# Patient Record
Sex: MALE | Race: WHITE | Employment: OTHER | URBAN - METROPOLITAN AREA
[De-identification: names, ages, dates, MRNs, and addresses within clinical notes are randomized per-mention and may not be internally consistent; named-entity substitution may affect disease eponyms.]

---

## 2017-05-22 PROBLEM — R10.12 LUQ ABDOMINAL PAIN: Status: ACTIVE | Noted: 2017-05-22

## 2017-12-18 PROBLEM — N52.9 MALE ERECTILE DYSFUNCTION, UNSPECIFIED: Status: ACTIVE | Noted: 2017-12-18

## 2018-12-18 PROBLEM — J30.9 CHRONIC ALLERGIC RHINITIS: Status: ACTIVE | Noted: 2018-08-16

## 2018-12-18 PROBLEM — G45.9 TRANSIENT CEREBRAL ISCHEMIA: Status: ACTIVE | Noted: 2017-12-01

## 2018-12-18 PROBLEM — G47.33 OSA (OBSTRUCTIVE SLEEP APNEA): Status: ACTIVE | Noted: 2018-08-16

## 2018-12-18 PROBLEM — J32.0 CHRONIC MAXILLARY SINUSITIS: Status: ACTIVE | Noted: 2018-10-24

## 2018-12-18 PROBLEM — J34.2 DEVIATED NASAL SEPTUM: Status: ACTIVE | Noted: 2018-09-27

## 2018-12-18 PROBLEM — H90.3 SENSORINEURAL HEARING LOSS (SNHL) OF BOTH EARS: Status: ACTIVE | Noted: 2018-10-31

## 2019-07-01 ENCOUNTER — HOSPITAL ENCOUNTER (OUTPATIENT)
Dept: NON INVASIVE DIAGNOSTICS | Age: 69
Discharge: HOME | End: 2019-07-01
Payer: MEDICARE

## 2019-07-01 DIAGNOSIS — Z01.818 PREOPERATIVE EXAMINATION, UNSPECIFIED: ICD-10-CM

## 2019-07-01 LAB
ATRIAL RATE: 66 BPM
CALCULATED P AXIS, ECG09: 10 DEGREES
CALCULATED R AXIS, ECG10: -12 DEGREES
CALCULATED T AXIS, ECG11: 36 DEGREES
DIAGNOSIS, 93000: NORMAL
HCT VFR BLD AUTO: 43.5 % (ref 36–48)
HGB BLD-MCNC: 14.2 G/DL (ref 13–16)
P-R INTERVAL, ECG05: 188 MS
POTASSIUM SERPL-SCNC: 5.2 MMOL/L (ref 3.5–5.5)
Q-T INTERVAL, ECG07: 392 MS
QRS DURATION, ECG06: 94 MS
QTC CALCULATION (BEZET), ECG08: 410 MS
VENTRICULAR RATE, ECG03: 66 BPM

## 2019-07-01 PROCEDURE — 84132 ASSAY OF SERUM POTASSIUM: CPT

## 2019-07-01 PROCEDURE — 93005 ELECTROCARDIOGRAM TRACING: CPT

## 2019-07-01 PROCEDURE — 36415 COLL VENOUS BLD VENIPUNCTURE: CPT

## 2019-07-01 PROCEDURE — 85018 HEMOGLOBIN: CPT

## 2019-07-02 ENCOUNTER — HOSPITAL ENCOUNTER (OUTPATIENT)
Dept: LAB | Age: 69
Discharge: HOME | End: 2019-07-02
Payer: MEDICARE

## 2019-07-02 PROCEDURE — 87106 FUNGI IDENTIFICATION YEAST: CPT

## 2019-07-02 PROCEDURE — 87077 CULTURE AEROBIC IDENTIFY: CPT

## 2019-07-02 PROCEDURE — 87070 CULTURE OTHR SPECIMN AEROBIC: CPT

## 2019-07-02 PROCEDURE — 87186 SC STD MICRODIL/AGAR DIL: CPT

## 2019-07-02 PROCEDURE — 87075 CULTR BACTERIA EXCEPT BLOOD: CPT

## 2019-07-06 LAB
BACTERIA SPEC CULT: ABNORMAL
GRAM STN SPEC: ABNORMAL
GRAM STN SPEC: ABNORMAL
SERVICE CMNT-IMP: ABNORMAL

## 2019-07-07 LAB
BACTERIA SPEC CULT: NORMAL
SERVICE CMNT-IMP: NORMAL

## 2019-07-08 ENCOUNTER — HOSPITAL ENCOUNTER (INPATIENT)
Age: 69
LOS: 3 days | Discharge: HOME HEALTH | DRG: 572 | End: 2019-07-11
Attending: HOSPITALIST | Admitting: HOSPITALIST
Payer: MEDICARE

## 2019-07-08 ENCOUNTER — APPOINTMENT (OUTPATIENT)
Dept: MRI IMAGING | Age: 69
DRG: 572 | End: 2019-07-08
Attending: HOSPITALIST
Payer: MEDICARE

## 2019-07-08 PROBLEM — L03.116 CELLULITIS OF FOOT, LEFT: Status: ACTIVE | Noted: 2019-07-08

## 2019-07-08 PROBLEM — L03.90 CELLULITIS: Status: ACTIVE | Noted: 2019-07-08

## 2019-07-08 LAB
ALBUMIN SERPL-MCNC: 3.1 G/DL (ref 3.4–5)
ALBUMIN/GLOB SERPL: 0.8 {RATIO} (ref 0.8–1.7)
ALP SERPL-CCNC: 112 U/L (ref 45–117)
ALT SERPL-CCNC: 36 U/L (ref 16–61)
ANION GAP SERPL CALC-SCNC: 7 MMOL/L (ref 3–18)
AST SERPL-CCNC: 27 U/L (ref 15–37)
BASOPHILS # BLD: 0 K/UL (ref 0–0.1)
BASOPHILS NFR BLD: 0 % (ref 0–2)
BILIRUB SERPL-MCNC: 0.4 MG/DL (ref 0.2–1)
BUN SERPL-MCNC: 20 MG/DL (ref 7–18)
BUN/CREAT SERPL: 21 (ref 12–20)
CALCIUM SERPL-MCNC: 9 MG/DL (ref 8.5–10.1)
CHLORIDE SERPL-SCNC: 103 MMOL/L (ref 100–108)
CO2 SERPL-SCNC: 27 MMOL/L (ref 21–32)
CREAT SERPL-MCNC: 0.97 MG/DL (ref 0.6–1.3)
DIFFERENTIAL METHOD BLD: ABNORMAL
EOSINOPHIL # BLD: 0 K/UL (ref 0–0.4)
EOSINOPHIL NFR BLD: 0 % (ref 0–5)
ERYTHROCYTE [DISTWIDTH] IN BLOOD BY AUTOMATED COUNT: 15.4 % (ref 11.6–14.5)
GLOBULIN SER CALC-MCNC: 4 G/DL (ref 2–4)
GLUCOSE SERPL-MCNC: 146 MG/DL (ref 74–99)
HCT VFR BLD AUTO: 35.7 % (ref 36–48)
HGB BLD-MCNC: 11.8 G/DL (ref 13–16)
LYMPHOCYTES # BLD: 1.2 K/UL (ref 0.9–3.6)
LYMPHOCYTES NFR BLD: 11 % (ref 21–52)
MAGNESIUM SERPL-MCNC: 1.9 MG/DL (ref 1.6–2.6)
MCH RBC QN AUTO: 29.7 PG (ref 24–34)
MCHC RBC AUTO-ENTMCNC: 33.1 G/DL (ref 31–37)
MCV RBC AUTO: 89.9 FL (ref 74–97)
MONOCYTES # BLD: 0.8 K/UL (ref 0.05–1.2)
MONOCYTES NFR BLD: 8 % (ref 3–10)
NEUTS SEG # BLD: 9 K/UL (ref 1.8–8)
NEUTS SEG NFR BLD: 81 % (ref 40–73)
PLATELET # BLD AUTO: 261 K/UL (ref 135–420)
PMV BLD AUTO: 9.4 FL (ref 9.2–11.8)
POTASSIUM SERPL-SCNC: 4.8 MMOL/L (ref 3.5–5.5)
PROT SERPL-MCNC: 7.1 G/DL (ref 6.4–8.2)
RBC # BLD AUTO: 3.97 M/UL (ref 4.7–5.5)
SODIUM SERPL-SCNC: 137 MMOL/L (ref 136–145)
WBC # BLD AUTO: 11.1 K/UL (ref 4.6–13.2)

## 2019-07-08 PROCEDURE — 74011250636 HC RX REV CODE- 250/636: Performed by: HOSPITALIST

## 2019-07-08 PROCEDURE — 73720 MRI LWR EXTREMITY W/O&W/DYE: CPT

## 2019-07-08 PROCEDURE — 74011250637 HC RX REV CODE- 250/637: Performed by: HOSPITALIST

## 2019-07-08 PROCEDURE — 36415 COLL VENOUS BLD VENIPUNCTURE: CPT

## 2019-07-08 PROCEDURE — 80053 COMPREHEN METABOLIC PANEL: CPT

## 2019-07-08 PROCEDURE — 85025 COMPLETE CBC W/AUTO DIFF WBC: CPT

## 2019-07-08 PROCEDURE — 74011636320 HC RX REV CODE- 636/320: Performed by: HOSPITALIST

## 2019-07-08 PROCEDURE — A9575 INJ GADOTERATE MEGLUMI 0.1ML: HCPCS | Performed by: HOSPITALIST

## 2019-07-08 PROCEDURE — 83735 ASSAY OF MAGNESIUM: CPT

## 2019-07-08 PROCEDURE — 65270000029 HC RM PRIVATE

## 2019-07-08 RX ORDER — KETOROLAC TROMETHAMINE 30 MG/ML
15 INJECTION, SOLUTION INTRAMUSCULAR; INTRAVENOUS
Status: DISCONTINUED | OUTPATIENT
Start: 2019-07-08 | End: 2019-07-11 | Stop reason: HOSPADM

## 2019-07-08 RX ORDER — ATORVASTATIN CALCIUM 20 MG/1
80 TABLET, FILM COATED ORAL
Status: DISCONTINUED | OUTPATIENT
Start: 2019-07-08 | End: 2019-07-11 | Stop reason: HOSPADM

## 2019-07-08 RX ORDER — TAMSULOSIN HYDROCHLORIDE 0.4 MG/1
0.4 CAPSULE ORAL DAILY
Status: DISCONTINUED | OUTPATIENT
Start: 2019-07-09 | End: 2019-07-11 | Stop reason: HOSPADM

## 2019-07-08 RX ORDER — LORATADINE 10 MG/1
10 TABLET ORAL DAILY
Status: DISCONTINUED | OUTPATIENT
Start: 2019-07-09 | End: 2019-07-11 | Stop reason: HOSPADM

## 2019-07-08 RX ORDER — NALOXONE HYDROCHLORIDE 0.4 MG/ML
0.4 INJECTION, SOLUTION INTRAMUSCULAR; INTRAVENOUS; SUBCUTANEOUS AS NEEDED
Status: DISCONTINUED | OUTPATIENT
Start: 2019-07-08 | End: 2019-07-11 | Stop reason: HOSPADM

## 2019-07-08 RX ORDER — ACETAMINOPHEN 325 MG/1
650 TABLET ORAL
Status: DISCONTINUED | OUTPATIENT
Start: 2019-07-08 | End: 2019-07-11 | Stop reason: HOSPADM

## 2019-07-08 RX ORDER — SERTRALINE HYDROCHLORIDE 50 MG/1
50 TABLET, FILM COATED ORAL DAILY
Status: DISCONTINUED | OUTPATIENT
Start: 2019-07-09 | End: 2019-07-11 | Stop reason: HOSPADM

## 2019-07-08 RX ORDER — MONTELUKAST SODIUM 10 MG/1
10 TABLET ORAL DAILY
Status: DISCONTINUED | OUTPATIENT
Start: 2019-07-09 | End: 2019-07-11 | Stop reason: HOSPADM

## 2019-07-08 RX ORDER — DIPHENHYDRAMINE HYDROCHLORIDE 50 MG/ML
12.5 INJECTION, SOLUTION INTRAMUSCULAR; INTRAVENOUS
Status: DISCONTINUED | OUTPATIENT
Start: 2019-07-08 | End: 2019-07-11 | Stop reason: HOSPADM

## 2019-07-08 RX ORDER — ALLOPURINOL 100 MG/1
100 TABLET ORAL DAILY
Status: DISCONTINUED | OUTPATIENT
Start: 2019-07-09 | End: 2019-07-11 | Stop reason: HOSPADM

## 2019-07-08 RX ORDER — ONDANSETRON 2 MG/ML
4 INJECTION INTRAMUSCULAR; INTRAVENOUS
Status: DISCONTINUED | OUTPATIENT
Start: 2019-07-08 | End: 2019-07-11 | Stop reason: HOSPADM

## 2019-07-08 RX ORDER — VANCOMYCIN HYDROCHLORIDE
1250 EVERY 12 HOURS
Status: DISCONTINUED | OUTPATIENT
Start: 2019-07-09 | End: 2019-07-10 | Stop reason: DRUGHIGH

## 2019-07-08 RX ORDER — AMLODIPINE BESYLATE 5 MG/1
10 TABLET ORAL DAILY
Status: DISCONTINUED | OUTPATIENT
Start: 2019-07-09 | End: 2019-07-11 | Stop reason: HOSPADM

## 2019-07-08 RX ORDER — VANCOMYCIN/0.9 % SOD CHLORIDE 1.5G/250ML
1500 PLASTIC BAG, INJECTION (ML) INTRAVENOUS ONCE
Status: COMPLETED | OUTPATIENT
Start: 2019-07-08 | End: 2019-07-08

## 2019-07-08 RX ORDER — METOPROLOL TARTRATE 25 MG/1
25 TABLET, FILM COATED ORAL DAILY
Status: DISCONTINUED | OUTPATIENT
Start: 2019-07-09 | End: 2019-07-11 | Stop reason: HOSPADM

## 2019-07-08 RX ORDER — ATORVASTATIN CALCIUM 80 MG/1
80 TABLET, FILM COATED ORAL
COMMUNITY

## 2019-07-08 RX ORDER — HEPARIN SODIUM 5000 [USP'U]/ML
5000 INJECTION, SOLUTION INTRAVENOUS; SUBCUTANEOUS EVERY 8 HOURS
Status: DISCONTINUED | OUTPATIENT
Start: 2019-07-08 | End: 2019-07-11 | Stop reason: HOSPADM

## 2019-07-08 RX ADMIN — GADOTERATE MEGLUMINE 15 ML: 376.9 INJECTION INTRAVENOUS at 16:03

## 2019-07-08 RX ADMIN — HEPARIN SODIUM 5000 UNITS: 5000 INJECTION INTRAVENOUS; SUBCUTANEOUS at 13:50

## 2019-07-08 RX ADMIN — KETOROLAC TROMETHAMINE 15 MG: 30 INJECTION, SOLUTION INTRAMUSCULAR at 19:27

## 2019-07-08 RX ADMIN — HEPARIN SODIUM 5000 UNITS: 5000 INJECTION INTRAVENOUS; SUBCUTANEOUS at 21:41

## 2019-07-08 RX ADMIN — VANCOMYCIN HYDROCHLORIDE 1500 MG: 10 INJECTION, POWDER, LYOPHILIZED, FOR SOLUTION INTRAVENOUS at 16:25

## 2019-07-08 RX ADMIN — ATORVASTATIN CALCIUM 80 MG: 20 TABLET, FILM COATED ORAL at 21:41

## 2019-07-08 NOTE — H&P
History & Physical    Patient: Елена Sargent. MRN: [id number]  CSN: [id number]    YOB: 1950  Age: 80 y.o. Sex: male      DOA: 7/8/2019  Primary Care Provider:  Iline Kocher, MD      Assessment/Plan     Hospital Problems  Date Reviewed: 12/18/2017          Codes Class Noted POA    Cellulitis ICD-10-CM: L03.90  ICD-9-CM: 682.9  7/8/2019         * (Principal) Cellulitis of foot, left ICD-10-CM: L03.116  ICD-9-CM: 682.7  7/8/2019 Unknown        Rheumatoid arthritis ([facility] [address] ICD-10-CM: M06.9  ICD-9-CM: 714.0  12/5/2016 Yes        Pure hypercholesterolemia ICD-10-CM: E78.00  ICD-9-CM: 272.0  12/5/2016 Yes        Essential hypertension ICD-10-CM: I10  ICD-9-CM: 401.9  12/5/2016 Yes    Overview Signed 12/18/2018  3:11 PM by Mely Moss Assessment & Plan:   Well controlled on present therapy             GERD (gastroesophageal reflux disease) ICD-10-CM: K21.9  ICD-9-CM: 530.81  12/5/2016 Yes        Depression ICD-10-CM: F32.9  ICD-9-CM: 820  12/5/2016 Yes        History of aortic valve replacement ICD-10-CM: Z95.2  ICD-9-CM: V43.3  11/23/2016 Yes    Overview Signed 12/18/2018  3:11 PM by Mely Moss Assessment & Plan:   Echocardiogram done in December of 2017 showed that the aortic valve was well-seated. 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 Benjamin Sea on board        HTN, accelerated  Continue home medication. RA: f/u with rheumatologist       Depression   well controlled ,continue home medication     gerd   ppi     Hx of avr   Stable,     Estimate  length of stay : 2-3 day    DVT : heparin  ppi proph  CC: foot pain        HPI:     Елена Vinson is a 80 y.o. male who hx of RA, gerd, depression, htn was direct admission from Dr. Sourav Osei office.  He has foot infection for several weeks and f/u with Dr. Sourav Osei. He received incision in his office and prescribed oral abx, he came back to visited Sourav randolph and recommend to come to the hospital for iv abx. He has RA and was told the bone was \"eaten\". He denies any fever and chills and he states the abx prescribed cost him over $200. Denies any slurred speech/headache/cp/n/v/blurred vission/d/c/palpitation/gait change/bleeding. Daughter and wife were at the bedside    Visit Vitals  /74   Pulse 83   Temp 98.1 °F (36.7 °C)   Resp 16   SpO2 93%      O2 Device: Room air      Past Medical History:   Diagnosis Date    Anxiety     Anxiety     Aortic stenosis     Aortic stenosis     Arthritis     BPH (benign prostatic hypertrophy)     Depression     Depression     GERD (gastroesophageal reflux disease)     Gout     HTN (hypertension)     Renal calculi        Past Surgical History:   Procedure Laterality Date    HX AORTIC VALVE REPLACEMENT      HX COLONOSCOPY      HX HERNIA REPAIR      HX KNEE REPLACEMENT      HX LITHOTRIPSY  01/17/2017    SCPH: ESWL- 7 mm x 10 mm Right UPJ / Renal Pelvis stone, 2500 shocks max intensity level 5 Dr. Alice Dodge       Family History   Problem Relation Age of Onset    Heart Disease Father        Social History     Socioeconomic History    Marital status: SINGLE     Spouse name: Not on file    Number of children: Not on file    Years of education: Not on file    Highest education level: Not on file   Tobacco Use    Smoking status: Never Smoker    Smokeless tobacco: Never Used   Substance and Sexual Activity    Alcohol use: Yes       Prior to Admission medications    Medication Sig Start Date End Date Taking? Authorizing Provider   atorvastatin (LIPITOR) 80 mg tablet Take 80 mg by mouth nightly. Yes Provider, Historical   levocetirizine (XYZAL) 5 mg tablet Take  by mouth. Yes Provider, Historical   montelukast (SINGULAIR) 10 mg tablet Take 10 mg by mouth daily.    Yes Provider, Historical   tamsulosin (FLOMAX) 0.4 mg capsule Take 1 Cap by mouth daily. 12/18/18  Yes Ralf Son MD   amLODIPine (NORVASC) 10 mg tablet TAKE 1 TABLET DAILY 11/29/16  Yes Provider, Historical   omeprazole (PRILOSEC) 10 mg capsule  11/21/16  Yes Provider, Historical   sertraline (ZOLOFT) 50 mg tablet  12/2/16  Yes Provider, Historical   ibuprofen (MOTRIN) 800 mg tablet 800 mg three (3) times daily. Indications: Pain 12/2/16  Yes Provider, Historical   metoprolol tartrate (LOPRESSOR) 25 mg tablet  11/16/16  Yes Provider, Historical   multivitamin (ONE A DAY) tablet Take 1 Tab by Mouth Once a Day. Yes Provider, Historical   calcium carbonate (CALTREX) 600 mg (1,500 mg) tablet Take by Mouth Once a Day. Yes Provider, Historical   omega 3-dha-epa-fish oil (FISH OIL) 60- mg cap 500 mg. Yes Provider, Historical   cholecalciferol, vitamin d3, (VITAMIN D3) 400 unit cap 400 Units. Yes Provider, Historical   ENBREL SURECLICK 50 mg/mL (6.24 mL) injection  11/24/16   Provider, Historical   allopurinol (ZYLOPRIM) 100 mg tablet  10/2/16   Provider, Historical       No Known Allergies    Review of Systems  Gen: No fever, chills, malaise, weight loss/gain. Heent: No headache, rhinorrhea, epistaxis, ear pain, hearing loss, sinus pain, neck pain/stiffness, sore throat. Heart: No chest pain, palpitations, DOS SANTOS, pnd, or orthopnea. Resp: No cough, hemoptysis, wheezing and shortness of breath. GI: No nausea, vomiting, diarrhea, constipation, melena or hematochezia. : No urinary obstruction, dysuria or hematuria. Derm: left foot pain   Musc/skeletal: foot pain   Vasc: No edema, cyanosis or claudication. Endo: No heat/cold intolerance, no polyuria,polydipsia or polyphagia. Neuro: No unilateral weakness, numbness, tingling. No seizures. Heme: No easy bruising or bleeding.           Physical Exam:     Physical Exam:  Visit Vitals  /74   Pulse 83   Temp 98.1 °F (36.7 °C)   Resp 16   SpO2 93%      O2 Device: Room air    Temp (24hrs), Av.1 °F (36.7 °C), Min:98.1 °F (36.7 °C), Max:98.1 °F (36.7 °C)    No intake/output data recorded. No intake/output data recorded. General:  Awake, cooperative, no distress. Head:  Normocephalic, without obvious abnormality, atraumatic. Eyes:  Conjunctivae/corneas clear, sclera anicteric, PERRL, EOMs intact. Nose: Nares normal. No drainage or sinus tenderness. Throat: Lips, mucosa, and tongue normal. .   Neck: Supple, symmetrical, trachea midline, no adenopathy. Lungs:   Clear to auscultation bilaterally. Heart:  Regular rate and rhythm, S1, S2 normal, no murmur, click, rub or gallop. Abdomen: Soft, non-tender. Bowel sounds normal. No masses,  No organomegaly. Extremities: Extremities normal, left foot swelling/tenderness/warm surgical site covered with gauze    Pulses: 2+ and symmetric all extremities. Skin: Skin color-pink, texture, turgor normal except above . Capillary refill normal    Neurologic: CNII-XII intact. No focal motor or sensory deficit.        Labs Reviewed:    BMP:   Lab Results   Component Value Date/Time     2019 12:49 PM    K 4.8 2019 12:49 PM     2019 12:49 PM    CO2 27 2019 12:49 PM    AGAP 7 2019 12:49 PM     (H) 2019 12:49 PM    BUN 20 (H) 2019 12:49 PM    CREA 0.97 2019 12:49 PM    GFRAA >60 2019 12:49 PM    GFRNA >60 2019 12:49 PM     CMP:   Lab Results   Component Value Date/Time     2019 12:49 PM    K 4.8 2019 12:49 PM     2019 12:49 PM    CO2 27 2019 12:49 PM    AGAP 7 2019 12:49 PM     (H) 2019 12:49 PM    BUN 20 (H) 2019 12:49 PM    CREA 0.97 2019 12:49 PM    GFRAA >60 2019 12:49 PM    GFRNA >60 2019 12:49 PM    CA 9.0 2019 12:49 PM    MG 1.9 2019 12:49 PM    ALB 3.1 (L) 2019 12:49 PM    TP 7.1 2019 12:49 PM    GLOB 4.0 2019 12:49 PM    AGRAT 0.8 2019 12:49 PM    SGOT 27 07/08/2019 12:49 PM    ALT 36 07/08/2019 12:49 PM     CBC:   Lab Results   Component Value Date/Time    WBC 11.1 07/08/2019 12:49 PM    HGB 11.8 (L) 07/08/2019 12:49 PM    HCT 35.7 (L) 07/08/2019 12:49 PM     07/08/2019 12:49 PM     All Cardiac Markers in the last 24 hours: No results found for: CPK, CK, CKMMB, CKMB, RCK3, CKMBT, CKNDX, CKND1, JESSI, TROPT, TROIQ, RACHEL, TROPT, TNIPOC, BNP, BNPP  Recent Glucose Results:   Lab Results   Component Value Date/Time     (H) 07/08/2019 12:49 PM     ABG: No results found for: PH, PHI, PCO2, PCO2I, PO2, PO2I, HCO3, HCO3I, FIO2, FIO2I  COAGS: No results found for: APTT, PTP, INR  Liver Panel:   Lab Results   Component Value Date/Time    ALB 3.1 (L) 07/08/2019 12:49 PM    TP 7.1 07/08/2019 12:49 PM    GLOB 4.0 07/08/2019 12:49 PM    AGRAT 0.8 07/08/2019 12:49 PM    SGOT 27 07/08/2019 12:49 PM    ALT 36 07/08/2019 12:49 PM     07/08/2019 12:49 PM     Pancreatic Markers: No results found for: AMYLPOCT, AML, LIPPOCT, LPSE    Procedures/imaging: see electronic medical records for all procedures/Xrays and details which were not copied into this note but were reviewed prior to creation of Omega Stahl MD, Internal Medicine     CC: Justyn Martinez MD

## 2019-07-08 NOTE — PROGRESS NOTES
Occupational Therapy Evaluation/Treatment Attempt    Chart reviewed. Attempted Occupational Therapy Evaluation/Treatment, however, patient unable to be seen due to:  []  Nausea/vomiting  []  Eating  []  Pain  []  Patient too lethargic  []  Off Unit for testing/procedure  []  Dialysis treatment in progress   []  Telemetry Results  [x]  Other: Patient just admitted w/o H&P. Will await further information to safely assess patient and await medical management of the patient to see if symptoms resolve. Will f/u later as patient's schedule allows.    Thank you for this referral.  Nell Stone, OTR/L

## 2019-07-08 NOTE — PROGRESS NOTES
1220-arrived on unit  Primary Nurse Dahiana Hensley and Ruperto Mancia RN performed a dual skin assessment on this patient No impairment noted  Cedrick score is 21.

## 2019-07-08 NOTE — PROGRESS NOTES
7/8/2019 PT note: consult received and chart reviewed. Patient recently admitted; no H&P available for review at this time. Will await further information and f/u for PT evaluation as appropriate. Thank you.    Karrie August, PT

## 2019-07-08 NOTE — PROGRESS NOTES
Pharmacy Dosing Services: Vancomycin     Consult for Vancomycin Dosing by Pharmacy by Dr. Chuy Bach provided for this 80y.o. year old male , for indication of Bone and Joint Infection. Day of Therapy 1         Ht Readings from Last 1 Encounters:   07/08/19 175.3 cm (69\")            Wt Readings from Last 1 Encounters:   07/08/19 80.7 kg (178 lb)      Other Current Antibiotics none   Serum Creatinine          Lab Results    Component Value Date/Time      Creatinine 0.97 07/08/2019 12:49 PM     Creatinine (POC) 0.8 11/08/2016          Creatinine Clearance Estimated Creatinine Clearance: 73.9 mL/min (based on SCr of 0.97 mg/dL). BUN       Lab Results   Component Value Date/Time     BUN 20 (H) 07/08/2019 12:49 PM          WBC        Lab Results    Component Value Date/Time     WBC 11.1 07/08/2019 12:49 PM          H/H       Lab Results   Component Value Date/Time     HGB 11.8 (L) 07/08/2019 12:49 PM          Platelets          Lab Results    Component Value Date/Time      PLATELET 238 52/53/0408 12:49 PM          Temp 98.1 °F (36.7 °C)      Start Vancomycin therapy, with loading dose of 1500 mg IV once, scheduled for 7/8/19 at 16:00. Follow with maintenance dose of Vancomycin 1250 mg IV every 12 hours.      Dose calculated to approximate a therapeutic trough of 15 - 20 mcg/mL.       Pharmacy to follow daily and will make changes to dose and/or frequency based on clinical status.   Pharmacist James Hylton

## 2019-07-08 NOTE — PROGRESS NOTES
PT admitted to Room 312, A&Ox4, BUTT, no pain, tolerating room air. Dual skin assessment completed, scabs noted on back and left shoulder from itching, no dermal ulcer noted. 2 PIVs placed. Bed is in lowest/locked position, sticky socks given to pt, call button in reach, educated on how to use. Pt uses CPAP at home, wife will bring CPAP to hospital this afternoon.

## 2019-07-09 LAB
ANION GAP SERPL CALC-SCNC: 8 MMOL/L (ref 3–18)
BASOPHILS # BLD: 0 K/UL (ref 0–0.1)
BASOPHILS NFR BLD: 0 % (ref 0–3)
BUN SERPL-MCNC: 14 MG/DL (ref 7–18)
BUN/CREAT SERPL: 18 (ref 12–20)
CALCIUM SERPL-MCNC: 8.2 MG/DL (ref 8.5–10.1)
CHLORIDE SERPL-SCNC: 105 MMOL/L (ref 100–108)
CO2 SERPL-SCNC: 28 MMOL/L (ref 21–32)
CREAT SERPL-MCNC: 0.76 MG/DL (ref 0.6–1.3)
DIFFERENTIAL METHOD BLD: ABNORMAL
EOSINOPHIL # BLD: 0.3 K/UL (ref 0–0.4)
EOSINOPHIL NFR BLD: 3 % (ref 0–5)
ERYTHROCYTE [DISTWIDTH] IN BLOOD BY AUTOMATED COUNT: 15.2 % (ref 11.6–14.5)
GLUCOSE SERPL-MCNC: 82 MG/DL (ref 74–99)
HCT VFR BLD AUTO: 33.4 % (ref 36–48)
HGB BLD-MCNC: 11.2 G/DL (ref 13–16)
LYMPHOCYTES # BLD: 3.1 K/UL (ref 0.8–3.5)
LYMPHOCYTES NFR BLD: 28 % (ref 20–51)
MAGNESIUM SERPL-MCNC: 1.6 MG/DL (ref 1.6–2.6)
MCH RBC QN AUTO: 29.7 PG (ref 24–34)
MCHC RBC AUTO-ENTMCNC: 33.5 G/DL (ref 31–37)
MCV RBC AUTO: 88.6 FL (ref 74–97)
METAMYELOCYTES NFR BLD MANUAL: 1 %
MONOCYTES # BLD: 0.7 K/UL (ref 0–1)
MONOCYTES NFR BLD: 6 % (ref 2–9)
NEUTS SEG # BLD: 6.8 K/UL (ref 1.8–8)
NEUTS SEG NFR BLD: 62 % (ref 42–75)
PLATELET # BLD AUTO: 271 K/UL (ref 135–420)
PMV BLD AUTO: 9.7 FL (ref 9.2–11.8)
POTASSIUM SERPL-SCNC: 4.3 MMOL/L (ref 3.5–5.5)
RBC # BLD AUTO: 3.77 M/UL (ref 4.7–5.5)
RBC MORPH BLD: ABNORMAL
RBC MORPH BLD: ABNORMAL
SODIUM SERPL-SCNC: 141 MMOL/L (ref 136–145)
WBC # BLD AUTO: 10.9 K/UL (ref 4.6–13.2)

## 2019-07-09 PROCEDURE — 85025 COMPLETE CBC W/AUTO DIFF WBC: CPT

## 2019-07-09 PROCEDURE — 36415 COLL VENOUS BLD VENIPUNCTURE: CPT

## 2019-07-09 PROCEDURE — 65270000029 HC RM PRIVATE

## 2019-07-09 PROCEDURE — 74011250637 HC RX REV CODE- 250/637: Performed by: HOSPITALIST

## 2019-07-09 PROCEDURE — 74011250636 HC RX REV CODE- 250/636: Performed by: HOSPITALIST

## 2019-07-09 PROCEDURE — 83735 ASSAY OF MAGNESIUM: CPT

## 2019-07-09 PROCEDURE — 80048 BASIC METABOLIC PNL TOTAL CA: CPT

## 2019-07-09 RX ORDER — MAGNESIUM SULFATE HEPTAHYDRATE 40 MG/ML
2 INJECTION, SOLUTION INTRAVENOUS
Status: COMPLETED | OUTPATIENT
Start: 2019-07-09 | End: 2019-07-09

## 2019-07-09 RX ORDER — LINEZOLID 600 MG/1
600 TABLET, FILM COATED ORAL 2 TIMES DAILY
Qty: 20 TAB | Refills: 0 | Status: SHIPPED | OUTPATIENT
Start: 2019-07-09 | End: 2019-07-11 | Stop reason: SDUPTHER

## 2019-07-09 RX ADMIN — HEPARIN SODIUM 5000 UNITS: 5000 INJECTION INTRAVENOUS; SUBCUTANEOUS at 22:55

## 2019-07-09 RX ADMIN — HEPARIN SODIUM 5000 UNITS: 5000 INJECTION INTRAVENOUS; SUBCUTANEOUS at 05:16

## 2019-07-09 RX ADMIN — LORATADINE 10 MG: 10 TABLET ORAL at 09:01

## 2019-07-09 RX ADMIN — ATORVASTATIN CALCIUM 80 MG: 20 TABLET, FILM COATED ORAL at 22:56

## 2019-07-09 RX ADMIN — AMLODIPINE BESYLATE 10 MG: 5 TABLET ORAL at 09:01

## 2019-07-09 RX ADMIN — TAMSULOSIN HYDROCHLORIDE 0.4 MG: 0.4 CAPSULE ORAL at 09:01

## 2019-07-09 RX ADMIN — METOPROLOL TARTRATE 25 MG: 25 TABLET, FILM COATED ORAL at 09:01

## 2019-07-09 RX ADMIN — MAGNESIUM SULFATE HEPTAHYDRATE 2 G: 40 INJECTION, SOLUTION INTRAVENOUS at 15:47

## 2019-07-09 RX ADMIN — HEPARIN SODIUM 5000 UNITS: 5000 INJECTION INTRAVENOUS; SUBCUTANEOUS at 12:22

## 2019-07-09 RX ADMIN — KETOROLAC TROMETHAMINE 15 MG: 30 INJECTION, SOLUTION INTRAMUSCULAR at 22:56

## 2019-07-09 RX ADMIN — MAGNESIUM SULFATE HEPTAHYDRATE 2 G: 40 INJECTION, SOLUTION INTRAVENOUS at 13:53

## 2019-07-09 RX ADMIN — KETOROLAC TROMETHAMINE 15 MG: 30 INJECTION, SOLUTION INTRAMUSCULAR at 09:01

## 2019-07-09 RX ADMIN — MONTELUKAST 10 MG: 10 TABLET, FILM COATED ORAL at 09:01

## 2019-07-09 RX ADMIN — ALLOPURINOL 100 MG: 100 TABLET ORAL at 09:01

## 2019-07-09 RX ADMIN — ACETAMINOPHEN 650 MG: 325 TABLET ORAL at 15:47

## 2019-07-09 RX ADMIN — SERTRALINE HYDROCHLORIDE 50 MG: 50 TABLET ORAL at 09:01

## 2019-07-09 RX ADMIN — VANCOMYCIN HYDROCHLORIDE 1250 MG: 10 INJECTION, POWDER, LYOPHILIZED, FOR SOLUTION INTRAVENOUS at 05:18

## 2019-07-09 RX ADMIN — VANCOMYCIN HYDROCHLORIDE 1250 MG: 10 INJECTION, POWDER, LYOPHILIZED, FOR SOLUTION INTRAVENOUS at 16:41

## 2019-07-09 RX ADMIN — ACETAMINOPHEN 650 MG: 325 TABLET ORAL at 03:40

## 2019-07-09 NOTE — PROGRESS NOTES
Hospitalist Progress Note-critical care note     Patient: Marty Byers. MRN: [id number]  CSN: [id number]    YOB: 1950  Age: 80 y.o. Sex: male    DOA: 7/8/2019 LOS:  LOS: 1 day            Chief complaint: cellulitis of foot, RA,  Depression , hx avr     Assessment/Plan         Hospital Problems  Date Reviewed: 12/18/2017          Codes Class Noted POA    Cellulitis ICD-10-CM: L03.90  ICD-9-CM: 682.9  7/8/2019         * (Principal) Cellulitis of foot, left ICD-10-CM: L03.116  ICD-9-CM: 682.7  7/8/2019 Unknown        [facility] ICD-10-CM: M06.9  ICD-9-CM: 714.0  12/5/2016 Yes        Pure hypercholesterolemia ICD-10-CM: E78.00  ICD-9-CM: 272.0  12/5/2016 Yes        Essential hypertension ICD-10-CM: I10  ICD-9-CM: 401.9  12/5/2016 Yes    Overview Signed 12/18/2018  3:11 PM by Jaci Deluna     Last Assessment & Plan:   Well controlled on present therapy             GERD (gastroesophageal reflux disease) ICD-10-CM: K21.9  ICD-9-CM: 530.81  12/5/2016 Yes        Depression ICD-10-CM: F32.9  ICD-9-CM: 378  12/5/2016 Yes        History of aortic valve replacement ICD-10-CM: Z95.2  ICD-9-CM: V43.3  11/23/2016 Yes    Overview Signed 12/18/2018  3:11 PM by Jaci Deluna     Last Assessment & Plan:   Echocardiogram done in December of 2017 showed that the aortic valve was well-seated. 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 Mago Barajas on board   Wound care  Will continue vanc for now          HTN, accelerated  Continue home medication.     RA: f/u with rheumatologist        Depression   well controlled ,continue home medication      gerd   ppi      Hx of avr   Stable,     Will give mg. Continue mg . Pt/ot , wound care     Disposition :2-3 days   Review of systems:    General: No fevers or chills. Cardiovascular: No chest pain or pressure. No palpitations. Pulmonary: No shortness of breath. Gastrointestinal: No nausea, vomiting. Vital signs/Intake and Output:  Visit Vitals  /62 (BP 1 Location: Left arm, BP Patient Position: At rest)   Pulse 73   Temp 98.7 °F (37.1 °C)   Resp 18   Ht 5' 9\" (1.753 m)   Wt 81.1 kg (178 lb 12.8 oz)   SpO2 91%   BMI 26.40 kg/m²     Current Shift:  07/09 0701 - 07/09 1900  In: 2261 [P.O.:960; I.V.:250]  Out: -   Last three shifts:  07/07 1901 - 07/09 0700  In: 412.6 [P.O.:240; I.V.:172.6]  Out: 600 [Urine:600]    Physical Exam:  General: WD, WN. Alert, cooperative, no acute distress    HEENT: NC, Atraumatic. PERRLA, anicteric sclerae. Lungs: CTA Bilaterally. No Wheezing/Rhonchi/Rales. Heart:  Regular  rhythm,  + murmur, No Rubs, No Gallops  Abdomen: Soft, Non distended, Non tender.  +Bowel sounds,   Extremities: No c/c. Mild improving of erythema   Psych:   Not anxious or agitated. Neurologic:  No acute neurological deficit. Labs: Results:       Chemistry Recent Labs     07/09/19  0256 07/08/19  1249   GLU 82 146*    137   K 4.3 4.8    103   CO2 28 27   BUN 14 20*   CREA 0.76 0.97   CA 8.2* 9.0   AGAP 8 7   BUCR 18 21*   AP  --  112   TP  --  7.1   ALB  --  3.1*   GLOB  --  4.0   AGRAT  --  0.8      CBC w/Diff Recent Labs     07/09/19  0256 07/08/19  1249   WBC 10.9 11.1   RBC 3.77* 3.97*   HGB 11.2* 11.8*   HCT 33.4* 35.7*    261   GRANS 62 81*   LYMPH 28 11*   EOS 3 0      Cardiac Enzymes No results for input(s): CPK, CKND1, JESSI in the last 72 hours. No lab exists for component: CKRMB, TROIP   Coagulation No results for input(s): PTP, INR, APTT in the last 72 hours. No lab exists for component: INREXT    Lipid Panel No results found for: CHOL, CHOLPOCT, CHOLX, CHLST, CHOLV, 453449, HDL, LDL, LDLC, DLDLP, 350000, VLDLC, VLDL, TGLX, TRIGL, TRIGP, TGLPOCT, CHHD, CHHDX   BNP No results for input(s): BNPP in the last 72 hours.    Liver Enzymes Recent Labs     07/08/19  1249   TP 7.1   ALB 3.1*      SGOT 27      Thyroid Studies No results found for: T4, T3U, TSH, TSHEXT     Procedures/imaging: see electronic medical records for all procedures/Xrays and details which were not copied into this note but were reviewed prior to creation of Michel Gonsalez MD

## 2019-07-09 NOTE — PROGRESS NOTES
Reason for Admission:   Foot pain                  RRAT Score:   Mod; 17               Do you (patient/family) have any concerns for transition/discharge? Plan for utilizing home health:  Likely      Current Advanced Directive/Advance Care Plan:  Not on file            Transition of Care Plan:   [facility] vs SNF          Chart reviewed. Per H&P \"Howie Castaneda. is a 80 y.o. male who hx of RA, gerd, depression, htn was direct admission from Dr. Manan Damon office. He has foot infection for several weeks and f/u with Dr. Manan Damon. He received incision in his office and prescribed oral abx, he came back to visited Manan randolph and recommend to come to the hospital for iv abx. He has RA and was told the bone was \"eaten\". He denies any fever and chills and he states the abx prescribed cost him over $200. \"    CM met with pt and his son at bedside to discuss transition of care. Pt lives with his significant other and has family in the area to assist as needed. Pt uses a cane to ambulate and has access to a RW if needed. CM discussed HH vs SNF and provided a list of both to assist with transition of care. Noted pt will likely require Zyvox/Linesolid upon discharge. Provider e-perscribed this medication to the [facility]. The cost for this medication is $39.70. CM to continue to follow and await therapy evaluations to assist with determining an appropriate transition of care. CM continuing to follow. Care Management Interventions  PCP Verified by CM: Yes  Mode of Transport at Discharge: Other (see comment)(Family)  Transition of Care Consult (CM Consult): Discharge Planning  Health Maintenance Reviewed: Yes  Physical Therapy Consult: Yes  Occupational Therapy Consult: Yes  Current Support Network:  Other, Family Lives Nearby(lives with significant other)  Confirm Follow Up Transport: Family  Plan discussed with Pt/Family/Caregiver: Yes  Discharge Location  Discharge Placement: Home with home health(vs SNF)

## 2019-07-09 NOTE — WOUND CARE
Wound Care Progress Note        New consult placed by Dr. Darline Griffith for hydrogel dressing    Assessment:   Communication: A&O x 4 communicative    Independently repositions  Diet: reg  Patient reports no pain    Bilateral heel, buttocks,and sacral skin intact and without erythema. 1. POA Left dorsal foot - surgical dehisence (wound location & etiology) = 3.5 x 0.6 x 0.2 cm    Base is 100% of ligament  Tissue, minimum serous exudate. Periwound inatct & without erythema. Treatment: Silversorb hydrogel, xeroform, 4x4, candelario and ace wrap. Wound Recommendations:    Orders were give verbally by Dr. Kmi Suh Recommendations:  Minimize layers of linen/pads under patient to optimize support surface. Turn/reposition approximately every 2 hours and offload heels pillows or Prevalon boots.   Manage incontinence / promote continence; Proshield to buttocks and sacrum daily and as needed with incontinence care    Discussed above plan with patient & SHENG Jones    Transition of Care: Plan to follow weekly and per product recommendations while admitted to hospital.

## 2019-07-09 NOTE — H&P
Consult Note     This is a 80y.o. year old well known to me for severe celluilitis of the left foot. He underwent surgical evacuation and drainage and PO zyvox but the infection returned. I recommended admission to the hospital for IV abx    Subjective:  Past Medical History:   Diagnosis Date    Anxiety     Anxiety     Aortic stenosis     Aortic stenosis     Arthritis     BPH (benign prostatic hypertrophy)     Depression     Depression     GERD (gastroesophageal reflux disease)     Gout     HTN (hypertension)     Renal calculi      Past Surgical History:   Procedure Laterality Date    HX AORTIC VALVE REPLACEMENT      HX COLONOSCOPY      HX HERNIA REPAIR      HX KNEE REPLACEMENT      HX LITHOTRIPSY  01/17/2017    SCPH: ESWL- 7 mm x 10 mm Right UPJ / Renal Pelvis stone, 2500 shocks max intensity level 5 Dr. Rich Prince History     Socioeconomic History    Marital status: SINGLE     Spouse name: Not on file    Number of children: Not on file    Years of education: Not on file    Highest education level: Not on file   Occupational History    Not on file   Social Needs    Financial resource strain: Not on file    Food insecurity:     Worry: Not on file     Inability: Not on file    Transportation needs:     Medical: Not on file     Non-medical: Not on file   Tobacco Use    Smoking status: Never Smoker    Smokeless tobacco: Never Used   Substance and Sexual Activity    Alcohol use:  Yes    Drug use: Not on file    Sexual activity: Not on file   Lifestyle    Physical activity:     Days per week: Not on file     Minutes per session: Not on file    Stress: Not on file   Relationships    Social connections:     Talks on phone: Not on file     Gets together: Not on file     Attends Mandaeism service: Not on file     Active member of club or organization: Not on file     Attends meetings of clubs or organizations: Not on file     Relationship status: Not on file    Intimate partner violence:     Fear of current or ex partner: Not on file     Emotionally abused: Not on file     Physically abused: Not on file     Forced sexual activity: Not on file   Other Topics Concern    Not on file   Social History Narrative    Not on file     Family History   Problem Relation Age of Onset    Heart Disease Father            Objective:  Lower Extremity Physical Exam    Visit Vitals  /62 (BP 1 Location: Left arm, BP Patient Position: At rest)   Pulse 73   Temp 98.7 °F (37.1 °C)   Resp 18   Ht 5' 9\" (1.753 m)   Wt 81.1 kg (178 lb 12.8 oz)   SpO2 91%   BMI 26.40 kg/m²       Exam:Left foot with open wound and exposed EHL tendon. The wound has dehisced more since yesterday. Significant improvement in erythema and edema since yesterday. Palpable pedal pulses  No other sign of infection  MRI: No abscess or osteomyelitis    Assessment/Plan    SP Incision and drainage with continued MRSA cellulitis. Vancomycin seems to be helping significantly. Recommend continued local wound care to keep the tendon moist. We discussed excsional debridement of the wound and tendon with intent to eventual fuse the arthritic joint. However if possible will try to salvage the tendon in the short term if at all possible. I cannot close the wound until the infection is resolved. I have ordered a hydrogel dressing to try and preserve the tendon so that if we are going to more forward with wound closure rather than debridement the tendon may remain viable. Either way he will have fusion down the road once infection is resolved and tendon function at that point is not as important.       Payam Mercedes DPM  July 9, 2019  2:32 PM

## 2019-07-09 NOTE — PROGRESS NOTES
Occupational Therapy Evaluation/Treatment Attempt    Chart reviewed. Attempted Occupational Therapy Evaluation/Treatment, however, patient unable to be seen due to:  []  Nausea/vomiting  []  Eating  []  Pain  []  Patient too lethargic  []  Off Unit for testing/procedure  []  Dialysis treatment in progress   []  Telemetry Results  [x]  Other: Orders received from Hospitalist. Noted Podiatry Consult/Alber. Will attempt to contact MD for weight/bearing restrictions for OOB. Will f/u later as patient's schedule allows.    Thank you for this referral.  Nell Stone, OTR/L

## 2019-07-09 NOTE — PROGRESS NOTES
1920 received patient alert and oriented x4. Left wound with gauze and ace wrap dressing CDI. Shift Summary: Reported foot pain, gave PRN Toradol IV and Tylenol PO with relief. Patient walks to the bathroom with cane and assistance. Urinal and call bell within reach. Bedside and Verbal shift change report given to [facility]  (oncoming nurse) by Jose Manuel Ordoñez RN   (offgoing nurse). Report included the following information SBAR, Intake/Output, MAR and Recent Results.

## 2019-07-10 LAB
ANION GAP SERPL CALC-SCNC: 7 MMOL/L (ref 3–18)
BASOPHILS # BLD: 0 K/UL (ref 0–0.1)
BASOPHILS NFR BLD: 0 % (ref 0–2)
BUN SERPL-MCNC: 15 MG/DL (ref 7–18)
BUN/CREAT SERPL: 21 (ref 12–20)
CALCIUM SERPL-MCNC: 8.5 MG/DL (ref 8.5–10.1)
CHLORIDE SERPL-SCNC: 106 MMOL/L (ref 100–108)
CO2 SERPL-SCNC: 27 MMOL/L (ref 21–32)
CREAT SERPL-MCNC: 0.7 MG/DL (ref 0.6–1.3)
DATE LAST DOSE: NORMAL
DIFFERENTIAL METHOD BLD: ABNORMAL
EOSINOPHIL # BLD: 0.6 K/UL (ref 0–0.4)
EOSINOPHIL NFR BLD: 7 % (ref 0–5)
ERYTHROCYTE [DISTWIDTH] IN BLOOD BY AUTOMATED COUNT: 15.4 % (ref 11.6–14.5)
GLUCOSE SERPL-MCNC: 90 MG/DL (ref 74–99)
HCT VFR BLD AUTO: 34.5 % (ref 36–48)
HGB BLD-MCNC: 11.5 G/DL (ref 13–16)
LYMPHOCYTES # BLD: 2.3 K/UL (ref 0.9–3.6)
LYMPHOCYTES NFR BLD: 29 % (ref 21–52)
MAGNESIUM SERPL-MCNC: 2.3 MG/DL (ref 1.6–2.6)
MCH RBC QN AUTO: 29.5 PG (ref 24–34)
MCHC RBC AUTO-ENTMCNC: 33.3 G/DL (ref 31–37)
MCV RBC AUTO: 88.5 FL (ref 74–97)
MONOCYTES # BLD: 1 K/UL (ref 0.05–1.2)
MONOCYTES NFR BLD: 13 % (ref 3–10)
NEUTS SEG # BLD: 4 K/UL (ref 1.8–8)
NEUTS SEG NFR BLD: 51 % (ref 40–73)
PLATELET # BLD AUTO: 347 K/UL (ref 135–420)
PMV BLD AUTO: 9.2 FL (ref 9.2–11.8)
POTASSIUM SERPL-SCNC: 4.5 MMOL/L (ref 3.5–5.5)
RBC # BLD AUTO: 3.9 M/UL (ref 4.7–5.5)
RBC MORPH BLD: ABNORMAL
RBC MORPH BLD: ABNORMAL
REPORTED DOSE,DOSE: NORMAL UNITS
REPORTED DOSE/TIME,TMG: 446
SODIUM SERPL-SCNC: 140 MMOL/L (ref 136–145)
VANCOMYCIN TROUGH SERPL-MCNC: 13.6 UG/ML (ref 10–20)
WBC # BLD AUTO: 7.9 K/UL (ref 4.6–13.2)

## 2019-07-10 PROCEDURE — 83735 ASSAY OF MAGNESIUM: CPT

## 2019-07-10 PROCEDURE — 80202 ASSAY OF VANCOMYCIN: CPT

## 2019-07-10 PROCEDURE — 74011250637 HC RX REV CODE- 250/637: Performed by: HOSPITALIST

## 2019-07-10 PROCEDURE — 85025 COMPLETE CBC W/AUTO DIFF WBC: CPT

## 2019-07-10 PROCEDURE — 65270000029 HC RM PRIVATE

## 2019-07-10 PROCEDURE — 36415 COLL VENOUS BLD VENIPUNCTURE: CPT

## 2019-07-10 PROCEDURE — 74011250636 HC RX REV CODE- 250/636: Performed by: HOSPITALIST

## 2019-07-10 PROCEDURE — 97166 OT EVAL MOD COMPLEX 45 MIN: CPT

## 2019-07-10 PROCEDURE — 80048 BASIC METABOLIC PNL TOTAL CA: CPT

## 2019-07-10 RX ORDER — VANCOMYCIN/0.9 % SOD CHLORIDE 1.5G/250ML
1500 PLASTIC BAG, INJECTION (ML) INTRAVENOUS EVERY 12 HOURS
Status: DISCONTINUED | OUTPATIENT
Start: 2019-07-11 | End: 2019-07-11 | Stop reason: HOSPADM

## 2019-07-10 RX ADMIN — ACETAMINOPHEN 650 MG: 325 TABLET ORAL at 13:16

## 2019-07-10 RX ADMIN — HEPARIN SODIUM 5000 UNITS: 5000 INJECTION INTRAVENOUS; SUBCUTANEOUS at 13:16

## 2019-07-10 RX ADMIN — AMLODIPINE BESYLATE 10 MG: 5 TABLET ORAL at 08:51

## 2019-07-10 RX ADMIN — HEPARIN SODIUM 5000 UNITS: 5000 INJECTION INTRAVENOUS; SUBCUTANEOUS at 20:08

## 2019-07-10 RX ADMIN — HEPARIN SODIUM 5000 UNITS: 5000 INJECTION INTRAVENOUS; SUBCUTANEOUS at 04:45

## 2019-07-10 RX ADMIN — KETOROLAC TROMETHAMINE 15 MG: 30 INJECTION, SOLUTION INTRAMUSCULAR at 20:08

## 2019-07-10 RX ADMIN — TAMSULOSIN HYDROCHLORIDE 0.4 MG: 0.4 CAPSULE ORAL at 08:51

## 2019-07-10 RX ADMIN — LORATADINE 10 MG: 10 TABLET ORAL at 08:52

## 2019-07-10 RX ADMIN — SERTRALINE HYDROCHLORIDE 50 MG: 50 TABLET ORAL at 08:52

## 2019-07-10 RX ADMIN — ATORVASTATIN CALCIUM 80 MG: 20 TABLET, FILM COATED ORAL at 22:29

## 2019-07-10 RX ADMIN — ALLOPURINOL 100 MG: 100 TABLET ORAL at 08:52

## 2019-07-10 RX ADMIN — KETOROLAC TROMETHAMINE 15 MG: 30 INJECTION, SOLUTION INTRAMUSCULAR at 08:51

## 2019-07-10 RX ADMIN — METOPROLOL TARTRATE 25 MG: 25 TABLET, FILM COATED ORAL at 08:51

## 2019-07-10 RX ADMIN — MONTELUKAST 10 MG: 10 TABLET, FILM COATED ORAL at 08:52

## 2019-07-10 RX ADMIN — VANCOMYCIN HYDROCHLORIDE 1250 MG: 10 INJECTION, POWDER, LYOPHILIZED, FOR SOLUTION INTRAVENOUS at 18:02

## 2019-07-10 RX ADMIN — VANCOMYCIN HYDROCHLORIDE 1250 MG: 10 INJECTION, POWDER, LYOPHILIZED, FOR SOLUTION INTRAVENOUS at 04:46

## 2019-07-10 NOTE — PROGRESS NOTES
Shift Summary: Uneventful Shift. Patient requiring pain medication, gave PRN meds, with relief. Call bell within reach. assited patient to bathroom with cane. Bedside and Verbal shift change report given to Silvestre Karimi RN (oncoming nurse) by Nicole Benítez RN   (offgoing nurse). Report included the following information SBAR, Procedure Summary, Intake/Output, Accordion and Recent Results.

## 2019-07-10 NOTE — PROGRESS NOTES
OCCUPATIONAL THERAPY EVALUATION/DISCHARGE    Patient: Елена Vinson (79 y.o. male)  Date: 7/10/2019  Primary Diagnosis: Cellulitis [L03.90]        Precautions:  Fall, Skin(L foot in Forefoot Offloading Shoe)  PLOF: Patient reports grossly mod I w/ SPC    ASSESSMENT AND RECOMMENDATIONS:  Based on the objective data described below, the patient presents with L foot dehiscence. Pt able to recall discussion w/ Dr. Sourav Osei w/ patient unaware of any weight bearing restrictions. Pt reports discussion will continue tomorrow w/ possible cutting of tendon that is exposed + PICC for ABX. Pt with good understanding of options. Pt states Dr. Sourav Osie gave him a Forefoot weight bearing surgical shoe. Pt able to morgan w/o assist and able to walk w/ SPC w/o assist. Pt instructed that he should continue exercise program with emphasis on UE strengthening in case he becomes NWB to assist in offloading and would benefit from a RW. Pt's family present and agreeable. Education: Reviewed home safety, body mechanics, importance of minimizing weight bearing over an exposed tendon/wound to assist in granulation tissue growth,   and adaptive dressing techniques (including similar height shoe as Ortho shoe to minimize pelvis tilt and back pain) with patient verbalizing understanding at this time     Skilled Occupational Therapy is not indicated at this time. Discharge Recommendations: Home Health  Further Equipment Recommendations for Discharge: To Be Determined (TBD) at next level of care        SUBJECTIVE:   Patient stated ? I've been walking around in the shoe Dr. Sourav Osei gave me.?    OBJECTIVE DATA SUMMARY:     Past Medical History:   Diagnosis Date    Anxiety     Anxiety     Aortic stenosis     Aortic stenosis     Arthritis     BPH (benign prostatic hypertrophy)     Depression     Depression     GERD (gastroesophageal reflux disease)     Gout     HTN (hypertension)     Renal calculi      Past Surgical History:   Procedure Laterality Date HX AORTIC VALVE REPLACEMENT      HX COLONOSCOPY      HX HERNIA REPAIR      HX KNEE REPLACEMENT      HX LITHOTRIPSY  01/17/2017    SCPH: ESWL- 7 mm x 10 mm Right UPJ / Renal Pelvis stone, 2500 shocks max intensity level 5 Dr. Claudio Velazquez     Barriers to Learning/Limitations: yes;  physical  Compensate with: visual, verbal, tactile, kinesthetic cues/model    Home Situation:   Home Situation  Home Environment: Private residence  # Steps to Enter: 6  One/Two Story Residence: Other (Comment)(3 story)  # of Interior Steps: 17  Height of Each Step (in): 10 inches  Interior Rails: Left  Lift Chair Available: No  Living Alone: No  Support Systems: Family member(s)  Patient Expects to be Discharged to[de-identified] Private residence  Current DME Used/Available at Home: CPAP  ? Right hand dominant   ? Left hand dominant    Cognitive/Behavioral Status:  Neurologic State: Alert; Appropriate for age  Orientation Level: Appropriate for age;Oriented X4  Cognition: Appropriate decision making; Appropriate for age attention/concentration; Appropriate safety awareness; Follows commands  Safety/Judgement: Awareness of environment; Fall prevention; Insight into deficits    Skin: L foot dressing in place w/ ACE (pt reports it is packed)  Edema: No significant edema noted     Vision/Perceptual:      Appears WFL w/ glasses     Coordination: BUE  Coordination: Within functional limits  Fine Motor Skills-Upper: Left Intact; Right Intact    Gross Motor Skills-Upper: Left Intact; Right Intact    Balance:  Sitting: Intact  Standing: Intact; With support    Strength: BUE  Strength: Generally decreased, functional    Tone & Sensation: BUE  Tone: Normal  Sensation: Intact    Range of Motion: BUE  AROM: Generally decreased, functional  PROM: Generally decreased, functional    Functional Mobility and Transfers for ADLs:  Transfers:  Sit to Stand: Supervision;Modified independent; Adaptive equipment  Bed to Chair: Supervision;Modified independent; Adaptive equipment   Toilet Transfer : Supervision;Modified independent; Adaptive equipment   Bathroom Mobility: Supervision/set up;Modified independent    ADL Assessment:  Feeding: Setup    Oral Facial Hygiene/Grooming: Setup;Modified Independent    Bathing: Setup;Modified independent    Upper Body Dressing: Setup;Modified independent    Lower Body Dressing: Setup;Modified independent    Toileting: Supervision;Modified independent    ADL Intervention:  Lower Body Dressing Assistance  Socks: Set-up  Shoes with Velcro: Set-up    Toileting  Toileting Assistance: Modified independent    Cognitive Retraining  Problem Solving: Inductive reason; Identifying the task; Identifying the problem;General alternative solution;Deductive reason; Awareness of environment  Safety/Judgement: Awareness of environment; Fall prevention; Insight into deficits    Therapeutic Exercise: Instructed on tricep strengthening (briefly); will defer to PT for HEP    Pain:  Pain level pre-treatment: 0/10   Pain level post-treatment: 0/10   Pain Intervention(s): Medication provided by Nursing (see MAR); Rest, Repositioning   Response to intervention: Nurse notified, See doc flow sheet    Activity Tolerance:   Fair. Patient able to stand 1-2 minute(s). Patient able to complete ADLs with intermittent rest breaks. Please refer to the flowsheet for vital signs taken during this treatment. After treatment:   ?  Patient left in no apparent distress sitting up in chair  ? Patient left in no apparent distress in bed/sitting EOB  ? Call bell left within reach  ? Nursing notified  ? Caregiver present/family; handoff to pastoral care  ? Bed alarm activated    COMMUNICATION/EDUCATION:   ?      Role of Occupational Therapy in the acute care setting  ? Home safety education was provided and the patient/caregiver indicated understanding. ? Patient/family have participated as able and agree with findings and recommendations.   ?      Patient is unable to participate in plan of care at this time. Thank you for this referral.  Delphine Bird  Time Calculation: 10 mins      Eval Complexity: History: HIGH Complexity : Extensive review of history including physical, cognitive and psychosocial history ; Examination: MEDIUM Complexity : 3-5 performance deficits relating to physical, cognitive , or psychosocial skils that result in activity limitations and / or participation restrictions; Decision Making:MEDIUM Complexity : Patient may present with comorbidities that affect occupational performnce.  Miniml to moderate modification of tasks or assistance (eg, physical or verbal ) with assesment(s) is necessary to enable patient to complete evaluation

## 2019-07-10 NOTE — CONSULTS
Infectious Disease Consultation Note    Date of Admission: 7/8/2019    Date of Consultation: 7/10/2019    Referred by: Dr. Kike Hollis       Reason for Referral: cellulitis with tendon exposure       Current Antimicrobials: Prior Antimicrobials   Vancomycin 7/8 - 2      Assessment / Plan:     Wound dehiscense, L foot  - due to cellulitis, wound infection  - cx 7/2: Klebsiella, E coli, candida (doubt these are true pathogens given improvement w/ gram positive treatment)  - MRI 7/8: no osteomyelitis, no tenosynovitis  - EHL tendon exposed -> continue Vancomycin while in hospital  -> if surgical intervention is to be done this admission, please get cultures from deepest involved tissues.  -> no good cultures to base antibiotic choice on. Had good response to Linezolid, ? also vancomycin so could continue either of these empirically watching closely for continued improvement  (or worsening)   Wound infection / cellulitis  - dehiscent wound, prior leg/foot cellulitis    Left leg/foot cellulitis  - improved on po Linezolid PTA    Recent \"gout\"  - may have been infected cyst, complicated by leg and foot cellulitis which improved on zyvox    Nephrolithiasis     HTN    HLD    BPH    AS    AS, s/p AVR 2016      Microbiology:   7/2 Wd cx klebsiella pneumoniae res amp, ampsulbact, E coli pansusceptible, Candida albicans    Lines / Catheters:   piv    HPI:     80year-old  male with RA, Gout, Nephrolithiasis, HTN, HLD, BPH, AS, s/p AVR 2016 admitted to [facility] 7/8/2019 due to non-healing wound on left foot. He had a painful \"sac\" on his left foot about a month ago that burst open and drained creamy thick liquid. His foot then became swollen and red with the redness extending proximally to his left leg just below the knee.   He was given an antibiotic which did not help, then Bactrim which also did not improved the redness but around 2 days after being started on Zyvox there was significant improvement of the redness which receded to a small area on the dorsum of the left foot. He tells me Dr. Macy Dillon performed surgical treatment of the area and continued zyvox. Sutures were removed around 7/5. On follow up there was apparently continued drainage and he was sent to the hospital 7/8 for IV antibiotics. IV Vancomycin was started and erythema has reportedly decreased. Today 7/10 , however, he was found to have wound dehiscence with exposure of the EHL tendon. There is a wound culture from 7/2 that grew pan-sensitive E coli, Klebsiella pneumoniae resistant to only ampicillin and amp-sulbactam and rare E coli. MRI left foot 7/8 showed a nodule between the fourth and fith metatarsals suggestive of a rheumatoid nodule or gout tophus. There was cellulitis of the dorsal medial forefoot but no tenosynovitis or drainable collection. Active Hospital Problems    Diagnosis Date Noted    Cellulitis 07/08/2019    Cellulitis of foot, left 07/08/2019    Rheumatoid arthritis ([facility] [address] 12/05/2016    Essential hypertension 12/05/2016     Last Assessment & Plan:   Well controlled on present therapy      Pure hypercholesterolemia 12/05/2016    GERD (gastroesophageal reflux disease) 12/05/2016    Depression 12/05/2016    History of aortic valve replacement 11/23/2016     Last Assessment & Plan:   Echocardiogram done in December of 2017 showed that the aortic valve was well-seated.        Past Medical History:   Diagnosis Date    Anxiety     Anxiety     Aortic stenosis     Aortic stenosis     Arthritis     BPH (benign prostatic hypertrophy)     Depression     Depression     GERD (gastroesophageal reflux disease)     Gout     HTN (hypertension)     Renal calculi      Past Surgical History:   Procedure Laterality Date    HX AORTIC VALVE REPLACEMENT      HX COLONOSCOPY      HX HERNIA REPAIR      HX KNEE REPLACEMENT      HX LITHOTRIPSY  01/17/2017    SCPH: ESWL- 7 mm x 10 mm Right UPJ / Renal Pelvis stone, 2500 shocks max intensity level 5 Dr. Segundo Carbajal     Family History   Problem Relation Age of Onset    Heart Disease Father      Social History     Socioeconomic History    Marital status: SINGLE     Spouse name: Not on file    Number of children: Not on file    Years of education: Not on file    Highest education level: Not on file   Occupational History    Not on file   Social Needs    Financial resource strain: Not on file    Food insecurity:     Worry: Not on file     Inability: Not on file    Transportation needs:     Medical: Not on file     Non-medical: Not on file   Tobacco Use    Smoking status: Never Smoker    Smokeless tobacco: Never Used   Substance and Sexual Activity    Alcohol use: Yes    Drug use: Not on file    Sexual activity: Not on file   Lifestyle    Physical activity:     Days per week: Not on file     Minutes per session: Not on file    Stress: Not on file   Relationships    Social connections:     Talks on phone: Not on file     Gets together: Not on file     Attends Islam service: Not on file     Active member of club or organization: Not on file     Attends meetings of clubs or organizations: Not on file     Relationship status: Not on file    Intimate partner violence:     Fear of current or ex partner: Not on file     Emotionally abused: Not on file     Physically abused: Not on file     Forced sexual activity: Not on file   Other Topics Concern    Not on file   Social History Narrative    Not on file       Allergies:    Patient has no known allergies.  .    Medications:     Current Facility-Administered Medications   Medication Dose Route Frequency    Vancomycin Trough Level - 30 minutes prior to 17:00 dose 7/10/19  1 Each Other ONCE    silver ER (SILVASORB) topical gel   Topical DAILY    acetaminophen (TYLENOL) tablet 650 mg  650 mg Oral Q4H PRN    ketorolac (TORADOL) injection 15 mg  15 mg IntraVENous Q6H PRN    naloxone (NARCAN) injection 0.4 mg  0.4 mg IntraVENous PRN    diphenhydrAMINE (BENADRYL) injection 12.5 mg  12.5 mg IntraVENous Q4H PRN    ondansetron (ZOFRAN) injection 4 mg  4 mg IntraVENous Q4H PRN    heparin (porcine) injection 5,000 Units  5,000 Units SubCUTAneous Q8H    allopurinol (ZYLOPRIM) tablet 100 mg  100 mg Oral DAILY    amLODIPine (NORVASC) tablet 10 mg  10 mg Oral DAILY    atorvastatin (LIPITOR) tablet 80 mg  80 mg Oral QHS    loratadine (CLARITIN) tablet 10 mg  10 mg Oral DAILY    metoprolol tartrate (LOPRESSOR) tablet 25 mg  25 mg Oral DAILY    montelukast (SINGULAIR) tablet 10 mg  10 mg Oral DAILY    sertraline (ZOLOFT) tablet 50 mg  50 mg Oral DAILY    tamsulosin (FLOMAX) capsule 0.4 mg  0.4 mg Oral DAILY    Vancomycin - Pharmacy to Dose  1 Each Other Rx Dosing/Monitoring    vancomycin (VANCOCIN) 1250 mg in  ml infusion  1,250 mg IntraVENous Q12H       ROS:   A comprehensive review of systems was negative except for that written in the History of Present Illness. Physical Exam:     Temp (24hrs), Av.9 °F (37.2 °C), Min:97.9 °F (36.6 °C), Max:99.7 °F (37.6 °C)    Visit Vitals  /74   Pulse 68   Temp 97.9 °F (36.6 °C)   Resp 16   Ht 5' 9\" (1.753 m)   Wt 81.1 kg (178 lb 12.8 oz)   SpO2 95%   BMI 26.40 kg/m²       General: Well developed, well nourished 80 y.o.  male in no acute distress. ENT: ENT exam normal, no neck nodes or sinus tenderness  Head: normocephalic, without obvious abnormality  Mouth:  mucous membranes moist, pharynx normal without lesions  Neck: supple, symmetrical, trachea midline   Cardio:  regular rate and rhythm, S1, S2 normal, no murmur, click, rub or gallop  Chest: inspection normal - no chest wall deformities or tenderness, respiratory effort normal  Lungs: clear to auscultation, no wheezes or rales and unlabored breathing  Abdomen: soft, non-tender. Bowel sounds normal. No masses, no organomegaly.   Extremities:  no redness or tenderness in the calves or thighs, no edema, left foot dressing intact- not removed.  Cell phone photos reviewed  Wound care photo 7/9      Lab results:     Chemistry  Recent Labs     07/10/19  0455 07/09/19  0256 07/08/19  1249   GLU 90 82 146*    141 137   K 4.5 4.3 4.8    105 103   CO2 27 28 27   BUN 15 14 20*   CREA 0.70 0.76 0.97   CA 8.5 8.2* 9.0   AGAP 7 8 7   BUCR 21* 18 21*   AP  --   --  112   TP  --   --  7.1   ALB  --   --  3.1*   GLOB  --   --  4.0   AGRAT  --   --  0.8       CBC w/ Diff  Recent Labs     07/10/19  0455 07/09/19  0256 07/08/19  1249   WBC 7.9 10.9 11.1   RBC 3.90* 3.77* 3.97*   HGB 11.5* 11.2* 11.8*   HCT 34.5* 33.4* 35.7*    271 261   GRANS 51 62 81*   LYMPH 29 28 11*   EOS 7* 3 0       Microbiology  All Micro Results     None             Caro Uriostegui MD, [facility]  Infectious Disease Specialist  Pager [phone]

## 2019-07-10 NOTE — PROGRESS NOTES
D/C Plan: HH vs [facility]    CM met with pt and his son at bedside to discuss transition of care options. Pt is pending an ID consult to assist with determining course of treatment. CM discussed and offered an LTSS screen. Pt and his son have declined LTSS. Given this a UAI will not be completed. Pt would like to continue to review the [facility] list provided and review it with his significant other before making a decision. Pt has also indicated if he is on a daily IVABX he would like to go to [place] [facility] given it is closer to his home. CM continuing to follow and assist with transition of care needs. Care Management Interventions  PCP Verified by CM: Yes  Mode of Transport at Discharge: Other (see comment)(Family)  Transition of Care Consult (CM Consult): Discharge Planning  Health Maintenance Reviewed: Yes  Physical Therapy Consult: Yes  Occupational Therapy Consult: Yes  Current Support Network:  Other, Family Lives Nearby(lives with significant other)  Confirm Follow Up Transport: Family  Plan discussed with Pt/Family/Caregiver: Yes  Discharge Location  Discharge Placement: Home with home health(vs [facility])

## 2019-07-10 NOTE — PROGRESS NOTES
Progress Note      Patient: Brandyn Maher. Sex: male          DOA: 7/8/2019       YOB: 1950      Age:  80 y.o.        LOS:  LOS: 2 days               Subjective:   Pt seen for follow up of non heaing dehisced wound with MRSA left foot. No new complaints    Objective:      Visit Vitals  /65 (BP 1 Location: Left arm, BP Patient Position: At rest)   Pulse 83   Temp 98.7 °F (37.1 °C)   Resp 16   Ht 5' 9\" (1.753 m)   Wt 81.1 kg (178 lb 12.8 oz)   SpO2 97%   BMI 26.40 kg/m²       Physical Exam:  Exposed tendon and non healing wound. Tendon is slightly dessicated. Reduced erythema      Intake and Output:  Current Shift:  No intake/output data recorded. Last three shifts:  07/08 1901 - 07/10 0700  In: 2312.6 [P.O.:1540; I.V.:772.6]  Out: 2050 [Urine:2050]    Lab/Data Reviewed: All lab results for the last 24 hours reviewed. All lab results for the last 24 hours reviewed. Medications Reviewed        Assessment/Plan     Principal Problem:    Cellulitis of foot, left (7/8/2019)    Active Problems:    Rheumatoid arthritis ([address] (12/5/2016)      Pure hypercholesterolemia (12/5/2016)      Essential hypertension (12/5/2016)      Overview: Last Assessment & Plan:       Well controlled on present therapy      GERD (gastroesophageal reflux disease) (12/5/2016)      Depression (12/5/2016)      History of aortic valve replacement (11/23/2016)      Overview: Last Assessment & Plan:       Echocardiogram done in December of 2017 showed that the aortic valve was       well-seated. Cellulitis (7/8/2019)        PLAN  Discussed wound closure but I am concerned about recurrent infection. Also discussed tenectomy and local wound care. I will hold off until tomorrow morning to see how the wound looks and how infection is resolving. Discussed at length with the family.     Eldridge Libman, DPM  July 10, 2019

## 2019-07-10 NOTE — PROGRESS NOTES
Problem: Falls - Risk of  Goal: *Absence of Falls  Description  Document Areta Arabella Fall Risk and appropriate interventions in the flowsheet.   Outcome: Progressing Towards Goal     Problem: Patient Education: Go to Patient Education Activity  Goal: Patient/Family Education  Outcome: Progressing Towards Goal     Problem: Patient Education: Go to Patient Education Activity  Goal: Patient/Family Education  Outcome: Progressing Towards Goal     Problem: Pain  Goal: *Control of Pain  Outcome: Progressing Towards Goal     Problem: Patient Education: Go to Patient Education Activity  Goal: Patient/Family Education  Outcome: Progressing Towards Goal

## 2019-07-10 NOTE — PROGRESS NOTES
0719  Bedside and Verbal shift change report given to Nika Torres RN by Maricruz Miller. Mateo,SHENG. Report included the following information SBAR, Kardex, OR Summary, Intake/Output and MAR.     1615  Wound care changed    1930  Bedside and Verbal shift change report given to FANI Herrera RN by Nika Torres, SHENG. Report included the following information SBAR, Kardex, OR Summary, Intake/Output and MAR.

## 2019-07-10 NOTE — PROGRESS NOTES
Hospitalist Progress Note-critical care note     Patient: Kobi King. MRN: [id number]  CSN: [id number]    YOB: 1950  Age: 80 y.o. Sex: male    DOA: 7/8/2019 LOS:  LOS: 2 days            Chief complaint: cellulitis of foot, RA,  Depression , hx avr     Assessment/Plan         Hospital Problems  Date Reviewed: 12/18/2017          Codes Class Noted POA    Cellulitis ICD-10-CM: L03.90  ICD-9-CM: 682.9  7/8/2019         * (Principal) Cellulitis of foot, left ICD-10-CM: L03.116  ICD-9-CM: 682.7  7/8/2019 Unknown        Rheumatoid arthritis ([facility] [address] ICD-10-CM: M06.9  ICD-9-CM: 714.0  12/5/2016 Yes        Pure hypercholesterolemia ICD-10-CM: E78.00  ICD-9-CM: 272.0  12/5/2016 Yes        Essential hypertension ICD-10-CM: I10  ICD-9-CM: 401.9  12/5/2016 Yes    Overview Signed 12/18/2018  3:11 PM by Vonda Connolly     Last Assessment & Plan:   Well controlled on present therapy             GERD (gastroesophageal reflux disease) ICD-10-CM: K21.9  ICD-9-CM: 530.81  12/5/2016 Yes        Depression ICD-10-CM: F32.9  ICD-9-CM: 692  12/5/2016 Yes        History of aortic valve replacement ICD-10-CM: Z95.2  ICD-9-CM: V43.3  11/23/2016 Yes    Overview Signed 12/18/2018  3:11 PM by Vonda Connolly     Last Assessment & Plan:   Echocardiogram done in December of 2017 showed that the aortic valve was well-seated. Cellulitis of left foot  Wound cx :stap haemolyticus sensitive to vanc, tetracycline, rifampin and linezolid   Mri foot done ,no  om indicated , but infection involved the tendon-will have ID on board for length of iv abx   dr. Bee Sanchez on board   Wound care  Will continue vanc for now          HTN, accelerated  Continue home medication.     RA: f/u with rheumatologist        Depression   well controlled     gerd   ppi      Hx of avr   Stable,     Subjective: feel fine got pain medication     Wife was at the bedside .  Continue iv abx, will have id on board for the recommendation of iv abx    Disposition :2-3 days   Review of systems:    General: No fevers or chills. Cardiovascular: No chest pain or pressure. No palpitations. Pulmonary: No shortness of breath. Gastrointestinal: No nausea, vomiting. Vital signs/Intake and Output:  Visit Vitals  /63   Pulse 81   Temp 99 °F (37.2 °C)   Resp 14   Ht 5' 9\" (1.753 m)   Wt 81.1 kg (178 lb 12.8 oz)   SpO2 99%   BMI 26.40 kg/m²     Current Shift:  07/10 0701 - 07/10 1900  In: 363.7 [I.V.:363.7]  Out: -   Last three shifts:  07/08 1901 - 07/10 0700  In: 2312.6 [P.O.:1540; I.V.:772.6]  Out: 2050 [Urine:2050]    Physical Exam:  General: WD, WN. Alert, cooperative, no acute distress    HEENT: NC, Atraumatic. PERRLA, anicteric sclerae. Lungs: CTA Bilaterally. No Wheezing/Rhonchi/Rales. Heart:  Regular  rhythm,  + murmur, No Rubs, No Gallops  Abdomen: Soft, Non distended, Non tender.  +Bowel sounds,   Extremities: No c/c. Mild improving of erythema   Psych:   Not anxious or agitated. Neurologic:  No acute neurological deficit. Labs: Results:       Chemistry Recent Labs     07/10/19  0455 07/09/19  0256 07/08/19  1249   GLU 90 82 146*    141 137   K 4.5 4.3 4.8    105 103   CO2 27 28 27   BUN 15 14 20*   CREA 0.70 0.76 0.97   CA 8.5 8.2* 9.0   AGAP 7 8 7   BUCR 21* 18 21*   AP  --   --  112   TP  --   --  7.1   ALB  --   --  3.1*   GLOB  --   --  4.0   AGRAT  --   --  0.8      CBC w/Diff Recent Labs     07/10/19  0455 07/09/19  0256 07/08/19  1249   WBC 7.9 10.9 11.1   RBC 3.90* 3.77* 3.97*   HGB 11.5* 11.2* 11.8*   HCT 34.5* 33.4* 35.7*    271 261   GRANS 51 62 81*   LYMPH 29 28 11*   EOS 7* 3 0      Cardiac Enzymes No results for input(s): CPK, CKND1, JESSI in the last 72 hours. No lab exists for component: CKRMB, TROIP   Coagulation No results for input(s): PTP, INR, APTT in the last 72 hours.     No lab exists for component: INREXT, INREXT    Lipid Panel No results found for: CHOL, CHOLPOCT, CHOLX, CHLST, CHOLV, [id number], HDL, LDL, LDLC, DLDLP, 370958, VLDLC, VLDL, TGLX, TRIGL, TRIGP, TGLPOCT, CHHD, CHHDX   BNP No results for input(s): BNPP in the last 72 hours.    Liver Enzymes Recent Labs     07/08/19  1249   TP 7.1   ALB 3.1*      SGOT 27      Thyroid Studies No results found for: T4, T3U, TSH, TSHEXT, TSHEXT     Procedures/imaging: see electronic medical records for all procedures/Xrays and details which were not copied into this note but were reviewed prior to creation of Lida Orozco MD

## 2019-07-10 NOTE — PROGRESS NOTES
Pharmacy Dosing Services: Vancomycin    Vancomycin Trough = 13.6 mcg/mL (7/10/19 @ 1620)  Indication = Bone and Joint Infection  Goal Trough = 15-20 mcg/mL  Will increase dose to Vancomycin 1500mg IV q12h    Pharmacy to follow daily and will make changes to dose and/or frequency based on clinical status.   Kianna Mobley, PharmD  [phone]

## 2019-07-11 ENCOUNTER — HOME HEALTH ADMISSION (OUTPATIENT)
Dept: HOME HEALTH SERVICES | Facility: HOME HEALTH | Age: 69
End: 2019-07-11
Payer: MEDICARE

## 2019-07-11 ENCOUNTER — APPOINTMENT (OUTPATIENT)
Dept: INTERVENTIONAL RADIOLOGY/VASCULAR | Age: 69
DRG: 572 | End: 2019-07-11
Attending: PODIATRIST
Payer: MEDICARE

## 2019-07-11 VITALS
HEIGHT: 69 IN | BODY MASS INDEX: 26.31 KG/M2 | WEIGHT: 177.6 LBS | SYSTOLIC BLOOD PRESSURE: 126 MMHG | HEART RATE: 79 BPM | TEMPERATURE: 98.3 F | OXYGEN SATURATION: 97 % | DIASTOLIC BLOOD PRESSURE: 75 MMHG | RESPIRATION RATE: 20 BRPM

## 2019-07-11 LAB
ANION GAP SERPL CALC-SCNC: 6 MMOL/L (ref 3–18)
BUN SERPL-MCNC: 16 MG/DL (ref 7–18)
BUN/CREAT SERPL: 20 (ref 12–20)
CALCIUM SERPL-MCNC: 8.6 MG/DL (ref 8.5–10.1)
CHLORIDE SERPL-SCNC: 106 MMOL/L (ref 100–108)
CO2 SERPL-SCNC: 26 MMOL/L (ref 21–32)
CREAT SERPL-MCNC: 0.82 MG/DL (ref 0.6–1.3)
GLUCOSE SERPL-MCNC: 100 MG/DL (ref 74–99)
MAGNESIUM SERPL-MCNC: 1.8 MG/DL (ref 1.6–2.6)
POTASSIUM SERPL-SCNC: 4.3 MMOL/L (ref 3.5–5.5)
SODIUM SERPL-SCNC: 138 MMOL/L (ref 136–145)

## 2019-07-11 PROCEDURE — 74011250636 HC RX REV CODE- 250/636: Performed by: HOSPITALIST

## 2019-07-11 PROCEDURE — 74011250637 HC RX REV CODE- 250/637: Performed by: HOSPITALIST

## 2019-07-11 PROCEDURE — 36415 COLL VENOUS BLD VENIPUNCTURE: CPT

## 2019-07-11 PROCEDURE — 0JBR0ZZ EXCISION OF LEFT FOOT SUBCUTANEOUS TISSUE AND FASCIA, OPEN APPROACH: ICD-10-PCS | Performed by: PODIATRIST

## 2019-07-11 PROCEDURE — 11042 DBRDMT SUBQ TIS 1ST 20SQCM/<: CPT

## 2019-07-11 PROCEDURE — 80048 BASIC METABOLIC PNL TOTAL CA: CPT

## 2019-07-11 PROCEDURE — 83735 ASSAY OF MAGNESIUM: CPT

## 2019-07-11 RX ORDER — HEPARIN SODIUM 200 [USP'U]/100ML
INJECTION, SOLUTION INTRAVENOUS
Status: DISCONTINUED
Start: 2019-07-11 | End: 2019-07-11 | Stop reason: HOSPADM

## 2019-07-11 RX ORDER — HEPARIN SODIUM (PORCINE) LOCK FLUSH IV SOLN 100 UNIT/ML 100 UNIT/ML
SOLUTION INTRAVENOUS
Status: DISCONTINUED
Start: 2019-07-11 | End: 2019-07-11 | Stop reason: HOSPADM

## 2019-07-11 RX ORDER — LINEZOLID 600 MG/1
600 TABLET, FILM COATED ORAL 2 TIMES DAILY
Qty: 14 TAB | Refills: 0 | Status: SHIPPED | OUTPATIENT
Start: 2019-07-11 | End: 2019-07-18

## 2019-07-11 RX ADMIN — ALLOPURINOL 100 MG: 100 TABLET ORAL at 08:57

## 2019-07-11 RX ADMIN — LORATADINE 10 MG: 10 TABLET ORAL at 08:57

## 2019-07-11 RX ADMIN — KETOROLAC TROMETHAMINE 15 MG: 30 INJECTION, SOLUTION INTRAMUSCULAR at 02:17

## 2019-07-11 RX ADMIN — HEPARIN SODIUM 5000 UNITS: 5000 INJECTION INTRAVENOUS; SUBCUTANEOUS at 04:46

## 2019-07-11 RX ADMIN — HEPARIN SODIUM 5000 UNITS: 5000 INJECTION INTRAVENOUS; SUBCUTANEOUS at 12:50

## 2019-07-11 RX ADMIN — METOPROLOL TARTRATE 25 MG: 25 TABLET, FILM COATED ORAL at 08:57

## 2019-07-11 RX ADMIN — TAMSULOSIN HYDROCHLORIDE 0.4 MG: 0.4 CAPSULE ORAL at 08:57

## 2019-07-11 RX ADMIN — ACETAMINOPHEN 650 MG: 325 TABLET ORAL at 13:52

## 2019-07-11 RX ADMIN — MONTELUKAST 10 MG: 10 TABLET, FILM COATED ORAL at 08:57

## 2019-07-11 RX ADMIN — AMLODIPINE BESYLATE 10 MG: 5 TABLET ORAL at 08:57

## 2019-07-11 RX ADMIN — ACETAMINOPHEN 650 MG: 325 TABLET ORAL at 09:42

## 2019-07-11 RX ADMIN — SERTRALINE HYDROCHLORIDE 50 MG: 50 TABLET ORAL at 08:57

## 2019-07-11 RX ADMIN — VANCOMYCIN HYDROCHLORIDE 1500 MG: 10 INJECTION, POWDER, LYOPHILIZED, FOR SOLUTION INTRAVENOUS at 02:17

## 2019-07-11 NOTE — DISCHARGE SUMMARY
Discharge Summary    Patient: Clinton Maciel. MRN: [id number]  CSN: [id number]    YOB: 1950  Age: 80 y.o. Sex: male    DOA: 7/8/2019 LOS:  LOS: 3 days   Discharge Date:      Primary Care Provider:  Guerrero Lane MD    Admission Diagnoses: Cellulitis [T87.94]    Discharge Diagnoses:    Hospital Problems  Date Reviewed: 12/18/2017          Codes Class Noted POA    Cellulitis ICD-10-CM: L03.90  ICD-9-CM: 682.9  7/8/2019         * (Principal) Cellulitis of foot, left ICD-10-CM: L03.116  ICD-9-CM: 682.7  7/8/2019 Unknown        [facility] ICD-10-CM: M06.9  ICD-9-CM: 714.0  12/5/2016 Yes        Pure hypercholesterolemia ICD-10-CM: E78.00  ICD-9-CM: 272.0  12/5/2016 Yes        Essential hypertension ICD-10-CM: I10  ICD-9-CM: 401.9  12/5/2016 Yes    Overview Signed 12/18/2018  3:11 PM by Yris Hensley     Last Assessment & Plan:   Well controlled on present therapy             GERD (gastroesophageal reflux disease) ICD-10-CM: K21.9  ICD-9-CM: 530.81  12/5/2016 Yes        Depression ICD-10-CM: F32.9  ICD-9-CM: 442  12/5/2016 Yes        History of aortic valve replacement ICD-10-CM: Z95.2  ICD-9-CM: V43.3  11/23/2016 Yes    Overview Signed 12/18/2018  3:11 PM by Yris Hensley     Last Assessment & Plan:   Echocardiogram done in December of 2017 showed that the aortic valve was well-seated. Discharge Condition: stable     Discharge Medications:     Current Discharge Medication List      START taking these medications    Details   linezolid (ZYVOX) 600 mg tablet Take 1 Tab by mouth two (2) times a day for 7 days.   Qty: 14 Tab, Refills: 0             Procedures : Sharp excisional debridement         Consults: Infectious Disease podiatry       PHYSICAL EXAM   Visit Vitals  /73 (BP 1 Location: Right arm, BP Patient Position: At rest)   Pulse 79   Temp 97.7 °F (36.5 °C)   Resp 16   Ht 5' 9\" (1.753 m)   Wt 80.6 kg (177 lb 9.6 oz)   SpO2 98%   BMI 26.23 kg/m²     General: Awake, cooperative, no acute distress    HEENT: NC, Atraumatic. PERRLA, EOMI. Anicteric sclerae. Lungs:  CTA Bilaterally. No Wheezing/Rhonchi/Rales. Heart:  Regular  rhythm,  No murmur, No Rubs, No Gallops  Abdomen: Soft, Non distended, Non tender. +Bowel sounds,   Extremities: No c/c. Left foot wrapped with ace bandage   Psych:   Not anxious or agitated. Neurologic:  No acute neurological deficits. Admission HPI :   Kayy Hamitlon is a 80 y.o. male who hx of RA, gerd, depression, htn was direct admission from Dr. Manan Damon office. He has foot infection for several weeks and f/u with Dr. Manan Damon. He received incision in his office and prescribed oral abx, he came back to visited Manan randolph and recommend to come to the hospital for iv abx. He has RA and was told the bone was \"eaten\". He denies any fever and chills and he states the abx prescribed cost him over $200.      Denies any slurred speech/headache/cp/n/v/blurred vission/d/c/palpitation/gait change/bleeding. Daughter and wife were at the bedside        Hospital Course :   Since he was admitted, iv vanc was administrated and his renal function and vanc lever were monitored closely. Mri foot was performed and  OM was ruled out. Due to the infection close to tendon, and multiple drug resistant, ID was on board. His cellulitis was responded to iv vanc and ID recommend zyvox for 7 days. Pt received zyvox  from out-pt pharmacy. Dr. Manan Damon was on board, [facility] excisional debridement done by him. Discharge planning discussed with patient and family, agree with the plan and no questions and concerns at this point.        Activity: Activity as tolerated    Diet: Regular Diet    Follow-up: PCP, Dr. Brandee Nogueira and Dr. Manan Damon     Disposition: home     Minutes spent on discharge: 45 min       Labs: Results:       Chemistry Recent Labs     07/11/19  0338 07/10/19  0455 07/09/19  0256 07/08/19  1249   * 90 82 146*    140 141 137   K 4.3 4.5 4.3 4.8    106 105 103   CO2 26 27 28 27   BUN 16 15 14 20*   CREA 0.82 0.70 0.76 0.97   CA 8.6 8.5 8.2* 9.0   AGAP 6 7 8 7   BUCR 20 21* 18 21*   AP  --   --   --  112   TP  --   --   --  7.1   ALB  --   --   --  3.1*   GLOB  --   --   --  4.0   AGRAT  --   --   --  0.8      CBC w/Diff Recent Labs     07/10/19  0455 07/09/19  0256 07/08/19  1249   WBC 7.9 10.9 11.1   RBC 3.90* 3.77* 3.97*   HGB 11.5* 11.2* 11.8*   HCT 34.5* 33.4* 35.7*    271 261   GRANS 51 62 81*   LYMPH 29 28 11*   EOS 7* 3 0      Cardiac Enzymes No results for input(s): CPK, CKND1, JESSI in the last 72 hours. No lab exists for component: CKRMB, TROIP   Coagulation No results for input(s): PTP, INR, APTT in the last 72 hours. No lab exists for component: INREXT    Lipid Panel No results found for: CHOL, CHOLPOCT, CHOLX, CHLST, CHOLV, 423007, HDL, LDL, LDLC, DLDLP, 027649, VLDLC, VLDL, TGLX, TRIGL, TRIGP, TGLPOCT, CHHD, CHHDX   BNP No results for input(s): BNPP in the last 72 hours. Liver Enzymes Recent Labs     07/08/19  1249   TP 7.1   ALB 3.1*      SGOT 27      Thyroid Studies No results found for: T4, T3U, TSH, TSHEXT         Significant Diagnostic Studies: Mri Foot Lt W Wo Cont    Result Date: 7/8/2019  Examination: MRI left foot without and with contrast. HISTORY: 80year-old patient with cellulitis of the left foot. Evaluation for potential osteomyelitis or drainable collection requested. History of rheumatoid arthritis. TECHNIQUE: An MRI examination of the left forefoot was performed utilizing a local coil. Coronal and sagittal STIR and T1 images as well as axial T1 and T2 fat-suppressed images were obtained before the uneventful intravenous administration of gadolinium-based contrast. Postcontrast imaging was performed in axial, coronal, and sagittal planes utilizing T1 fat-suppressed techniques. COMPARISON: No prior imaging is available for review.  FINDINGS: Anatomic detail this exam is limited by the presence of motion artifact, which is present on all provided sequences Evaluation of the bone marrow signal of the imaged forefoot demonstrates no evidence of fracture, stress fracture, or marrow stress reaction. Similarly, there is no evidence of a T1 isointense marrow replacing process to suggest osteomyelitis. Mild chondrosis of the talonavicular articulation noted. Navicular cuneiform, intercuneiform, and included tarsometatarsal articulations are normal in appearance. Severe chondrosis of the left first MTP joint is present. Small associated joint effusion or synovitis noted. Additionally advanced chondrosis of the metatarsal sesamoid articulations for the great toe present. Plantar plate appears grossly intact. Supporting Lisfranc ligamentous structures are intact in appearance. Flexor and extensor tendons appear within normal limits. Multilobular T1 hypointense, T2 intermediate, nonenhancing soft tissue nodule noted at the plantar aspect of the fourth metatarsal head with adjacent lateral extension into the fourth-fifth metatarsal interspace. This lesion is noted to measure approximately 2.3 x 1.4 x 2.3 cm in size (series 4, image 12). Intrinsic muscle bulk and signal remarkable for mild intrinsic muscular atrophy without evidence of abnormal muscular enhancement. Faint increased intramuscular edema signal present somewhat diffusely. Dorsal soft tissue ulceration over the medial forefoot, adjacent to the extensor hallucis longus tendon with skin thickening, subcutaneous reticulation, and enhancement involving the dorsal aspect of the great toe. No evidence of an organized or drainable collection. IMPRESSION: 1. No evidence of fracture, stress fracture, or marrow stress reaction involving the imaged left forefoot. 2. No T1 hypointense marrow replacing process or abnormal intramedullary enhancement demonstrated to suggest osteomyelitis.  3. Severe chondrosis across the left first MTP and MTS articulations. 4. Rounded, multilobular T2 isointense nodule within the plantar interspaces of the lateral forefoot, interspersed between the fourth and fifth metatarsal heads. Findings are suggestive of potential rheumatoid nodule given the overall nonenhancing appearance. Additional differential considerations could include manifestation of gout tophus formation. Intermetatarsal bursitis thought unlikely given the lack of enhancement. Overall imaging configuration is atypical for Echeverria's neuroma 5. Findings of cellulitis involving the dorsal medial forefoot, adjacent to the extensor hallucis longus tendon without associated tenosynovitis or organized/drainable collection.             [facility]     CC: Sushil Dorantes MD

## 2019-07-11 NOTE — PROGRESS NOTES
D/C Plan: [facility] 7/11/19    CM met with pt and his daughter at bedside to discuss transition of care. Pt will be discharged on po ABX (Linsolid) and HH. CM discussed HH and offered FOC. Pt/daughter have selected [facility]. CMS has been notified to assist.  Dr. Macy Dillon provided a script with specific wound care orders. This script has been forwarded to [facility] to assist with transition of care needs. Pt's family will transport pt home today. No other transition of care needs have been identified. Care Management Interventions  PCP Verified by CM: Yes  Mode of Transport at Discharge: Other (see comment)(Family)  Transition of Care Consult (CM Consult): Discharge Planning, [address]: Yes  Health Maintenance Reviewed: Yes  Physical Therapy Consult: Yes  Occupational Therapy Consult: Yes  Current Support Network:  Other, Family Lives Nearby(lives with significant other)  Confirm Follow Up Transport: Family  Plan discussed with Pt/Family/Caregiver: Yes  Freedom of Choice Offered: Yes  Discharge Location  Discharge Placement: Home with home health([facility])

## 2019-07-11 NOTE — PROGRESS NOTES
0500  Bedside and Verbal shift change report given to Estela Hicks RN by FANI Herrera RN. Report included the following information SBAR, Kardex, OR Summary, Intake/Output and MAR.     1510  Dual AVS review with SHENG Wise  1515  AVS review with pt, opportunity for questions and concerns at this time. D/c IV clean and dry. Properly discarded armbands.

## 2019-07-11 NOTE — DISCHARGE INSTRUCTIONS

## 2019-07-11 NOTE — WOUND CARE
Consult received for \"Needs new dressing. Old one fell off and nobody came back to change it\" placed by Dr. Marquis Archibald. Bedside nurse called and message relayed that this is a daily dressing change and wound care is not available to do daily dressings as these are done by the bedside staff. Wound gel left at bedside and orders available under MAR. Please contact wound care with any questions.

## 2019-07-11 NOTE — PROGRESS NOTES
Problem: Falls - Risk of  Goal: *Absence of Falls  Description  Document Yousuf Boeck Fall Risk and appropriate interventions in the flowsheet.   Outcome: Progressing Towards Goal     Problem: Patient Education: Go to Patient Education Activity  Goal: Patient/Family Education  Outcome: Progressing Towards Goal     Problem: Pain  Goal: *Control of Pain  Outcome: Progressing Towards Goal

## 2019-07-11 NOTE — PROGRESS NOTES
1930 Assumed patient care at this time. Report received from Elieser Sanchez RN. Patient in bed in lowest position with wheels locked. Call light within reach. 2008 Shift assessment completed and documented at this time. PRN toradol administered for pain. 0217 PRN toradol administered for pain. 3933 Bedside and verbal shift change report given to Elieser Sanchez RN (oncoming nurse) by Jennifer Menjivar RN (offgoing nurse). Report included the following information: SBAR, Kardex, MAR, and recent results.

## 2019-07-11 NOTE — PROGRESS NOTES
D/w Dr. Tara Vo and Dr. Pam Sierra. Was not aware there were office culture results scanned into EMR. Have reviewed this. The isolate was Staphylococcus haemolyticus resistant to TMP-SMX and Clindamycin, sensitive to Linezolid, Vancomycin and Tetracycline. No GN's. I do not think he failed Linezolid treatment but rather improved, with the cellulitis receding into an abscess requiring debridement but complicated by wound dehiscence. Would recommend resuming Linezolid po 600 mg bid for another 7 days with close OP follow up. Less desirable option would be Doxycycline 100 mg bid. Do not recommend IV antibiotics at this time.     Deandre Gomez MD, [address] [facility]  Infectious Diseases  [phone]

## 2019-07-11 NOTE — ACP (ADVANCE CARE PLANNING)
AMD Completed   provided education on Advance Medical Directives and assisted patient in completing the form. A copy was placed in the chart for scanning. Original and extra copies were returned to the patient.  completed visit with patient and offered Pastoral care. Chaplains will continue to follow and will provide pastoral care as needed or requested. May Birch.  Maricel Ervin

## 2019-07-11 NOTE — PROGRESS NOTES
Progress Note      Patient: Addy Sánchez. Sex: male          DOA: 7/8/2019       YOB: 1950      Age:  80 y.o.        LOS:  LOS: 3 days               Subjective:   Seen today for follow up of cellulitis left foot and wound dehiscence    Objective:      Visit Vitals  /81 (BP 1 Location: Right arm, BP Patient Position: At rest)   Pulse 92   Temp 98.1 °F (36.7 °C)   Resp 16   Ht 5' 9\" (1.753 m)   Wt 80.6 kg (177 lb 9.6 oz)   SpO2 96%   BMI 26.23 kg/m²       Physical Exam:  Left foot wound looks clean. Tendon still exposed. Improved erythema and edema noted    Intake and Output:  Current Shift:  07/11 0701 - 07/11 1900  In: -   Out: 500 [Urine:500]  Last three shifts:  07/09 1901 - 07/11 0700  In: 1633.7 [P.O.:900; I.V.:733.7]  Out: 1450 [Urine:1450]    Lab/Data Reviewed: All lab results for the last 24 hours reviewed. All lab results for the last 24 hours reviewed. Medications Reviewed        Assessment/Plan     Principal Problem:    Cellulitis of foot, left (7/8/2019)    Active Problems:    Rheumatoid arthritis ([address] (12/5/2016)      Pure hypercholesterolemia (12/5/2016)      Essential hypertension (12/5/2016)      Overview: Last Assessment & Plan:       Well controlled on present therapy      GERD (gastroesophageal reflux disease) (12/5/2016)      Depression (12/5/2016)      History of aortic valve replacement (11/23/2016)      Overview: Last Assessment & Plan:       Echocardiogram done in December of 2017 showed that the aortic valve was       well-seated. Cellulitis (7/8/2019)        PLAN:  OK for discharge today. Will hold off on tenectomy for now since wound is doing ok. Recommend IV vanc as out patient on PICC? But defer to ID on this. Per ID no good cultures available but these were obtained in office setting and provided to the hospital upon admission. Pt had MRSA resistant to bactrim. Needs home health for dressing change.  Sharp excisional debridement today with 15 blade to level of sub q tissue. Moderate bleeding. Wound measures [id number].     MARIAH my office [address] [place] [address], DPM  July 11, 2019

## 2019-07-11 NOTE — PROGRESS NOTES
Infectious Disease Follow-up Note      Date of Admission: 7/8/2019     Date of Note:  7/11/2019      Summary:     79 y/o CM w/ RA, Gout, Nephrolithiasis, HTN, HLD, BPH, AS s/p AVR adm with nonhealing wound. L foot. Recent left foot \"sac\" burst then foot, leg cellulitis - no improvement with bactrim, better with Zyvox but with continued wd drainage. Wd cx 6/3 Staph haemolyticus sens linezolid, Vanc, tetracycline res TMP-SMX, Clindamycin. Admitted on vanc, developed wd dehiscence, EHL tendon exposed. MRI no tenosynovitis or osteomyelitis. Interval History:     Feels well. No new complaints. Ready for discharge. D/w Dr. Fani Gay and Dr. Heladio Goldsmith. Was not aware there were office culture results scanned into EMR. Have now reviewed this. This was from 6/3 and the isolate was Staphylococcus haemolyticus resistant to TMP-SMX and Clindamycin, sensitive to Linezolid, Vancomycin and Tetracycline. No GN's. I do not think he failed Linezolid treatment but rather improved, with the cellulitis receding into an abscess requiring debridement but complicated by wound dehiscence. Would recommend resuming Linezolid po 600 mg bid for another 7 days with close OP follow up. Less desirable option would be Doxycycline 100 mg bid. Do not recommend IV antibiotics at this time.      Current Antimicrobials: Prior Antimicrobials   Vancomycin 7/8 - 3        Assessment / Plan:      Wound dehiscense, L foot  - due to cellulitis, wound infection  - cx 7/2: Klebsiella, E coli, candida (doubt these are true pathogens given improvement w/ gram positive treatment)  - MRI 7/8: no osteomyelitis, no tenosynovitis  - EHL tendon exposed  - office cx 6/3: Staph haemolyticus sens Linezolid, vancomycin, Tetracycline -> resume Linezolid po 600 mg bid x 7 days with close OP follow up.    -> Less desirable option would be Doxycycline 100 mg bid.    -> Do not recommend IV antibiotics at this time.  -> will be available to see patient at THE [facility] wound center next Wednesday am. Call [phone] for appointment.        Wound infection / cellulitis  - dehiscent wound, prior leg/foot cellulitis     Left leg/foot cellulitis  - improved on po Linezolid PTA     Recent \"gout\"  - may have been infected cyst, complicated by leg and foot cellulitis which improved on zyvox     Nephrolithiasis      HTN     HLD     BPH     AS     AS, s/p AVR 2016        Microbiology:   7/2        Wd cx klebsiella pneumoniae res amp, amp-sulbact, E coli pansusceptible, Candida albicans       Lines / Catheters:   piv    Patient Active Problem List   Diagnosis Code    Enlarged prostate with lower urinary tract symptoms (LUTS) N40.1    Microscopic hematuria R31.29    Calculus of both kidneys N20.0    Rheumatoid arthritis ([address] M06.9    Pure hypercholesterolemia E78.00    Essential hypertension I10    GERD (gastroesophageal reflux disease) K21.9    Disorder of bone and cartilage, unspecified M89.9, M94.9    Depression F32.9    Aortic stenosis I35.0    Gout M10.9    Hyperuricemia E79.0    Impaired fasting blood sugar R73.01    Nocturia R35.1    Intermittent Left Flank pain R10.12    Male erectile dysfunction, unspecified N52.9    Anxiety state F41.1    Chronic allergic rhinitis J30.9    History of aortic valve replacement Z95.2    Deviated nasal septum J34.2    Chronic maxillary sinusitis J32.0    Multiple-type hyperlipidemia E78.2    SHAILESH (obstructive sleep apnea) G47.33    Sensorineural hearing loss (SNHL) of both ears H90.3    Transient cerebral ischemia G45.9    Cellulitis L03.90    Cellulitis of foot, left L03.116           Current Facility-Administered Medications   Medication Dose Route Frequency    heparin (porcine) 100 unit/mL injection        heparinized saline 2 units/mL 1,000 unit/500 mL infusion        vancomycin (VANCOCIN) 1500 mg in  ml infusion  1,500 mg IntraVENous Q12H    silver ER (SILVASORB) topical gel   Topical DAILY    acetaminophen (TYLENOL) tablet 650 mg 650 mg Oral Q4H PRN    ketorolac (TORADOL) injection 15 mg  15 mg IntraVENous Q6H PRN    naloxone (NARCAN) injection 0.4 mg  0.4 mg IntraVENous PRN    diphenhydrAMINE (BENADRYL) injection 12.5 mg  12.5 mg IntraVENous Q4H PRN    ondansetron (ZOFRAN) injection 4 mg  4 mg IntraVENous Q4H PRN    heparin (porcine) injection 5,000 Units  5,000 Units SubCUTAneous Q8H    allopurinol (ZYLOPRIM) tablet 100 mg  100 mg Oral DAILY    amLODIPine (NORVASC) tablet 10 mg  10 mg Oral DAILY    atorvastatin (LIPITOR) tablet 80 mg  80 mg Oral QHS    loratadine (CLARITIN) tablet 10 mg  10 mg Oral DAILY    metoprolol tartrate (LOPRESSOR) tablet 25 mg  25 mg Oral DAILY    montelukast (SINGULAIR) tablet 10 mg  10 mg Oral DAILY    sertraline (ZOLOFT) tablet 50 mg  50 mg Oral DAILY    tamsulosin (FLOMAX) capsule 0.4 mg  0.4 mg Oral DAILY    Vancomycin - Pharmacy to Dose  1 Each Other Rx Dosing/Monitoring         Review of Systems - Negative except as in interval history       Objective:     Visit Vitals  /73 (BP 1 Location: Right arm, BP Patient Position: At rest)   Pulse 79   Temp 97.7 °F (36.5 °C)   Resp 16   Ht 5' 9\" (1.753 m)   Wt 80.6 kg (177 lb 9.6 oz)   SpO2 98%   BMI 26.23 kg/m²       Temp (24hrs), Av.3 °F (36.8 °C), Min:97.7 °F (36.5 °C), Max:99 °F (37.2 °C)      General: Well developed, well nourished 80 y.o.  male in no acute distress. HEENT: no scleral icterus, no conjunctival injection  Cardio:  regular rhythm, S1, S2 normal, no murmur, click, rub or gallop  Chest: inspection normal - no chest wall deformities or tenderness, respiratory effort normal  Lungs: no wheezes or rales  Abdomen: soft, non-tender. Bowel sounds normal. No masses, no organomegaly. Extremities:  no redness or tenderness in the calves or thighs, no edema, left foot dressing intact- not removed.          Lab results     Chemistry  Recent Labs     19  0338 07/10/19  0455 19  0256   * 90 82    140 141 K 4.3 4.5 4.3    106 105   CO2 26 27 28   BUN 16 15 14   CREA 0.82 0.70 0.76   CA 8.6 8.5 8.2*   AGAP 6 7 8   BUCR 20 21* 18       CBC w/ Diff  Recent Labs     07/10/19  0455 07/09/19  0256   WBC 7.9 10.9   RBC 3.90* 3.77*   HGB 11.5* 11.2*   HCT 34.5* 33.4*    271   GRANS 51 62   LYMPH 29 28   EOS 7* 3       Microbiology  All Micro Results     None             Onel Kiran MD, [id number] [facility]  Infectious Disease Specialist  Pager [phone]

## 2019-07-12 ENCOUNTER — HOME CARE VISIT (OUTPATIENT)
Dept: HOME HEALTH SERVICES | Facility: HOME HEALTH | Age: 69
End: 2019-07-12

## 2019-07-12 ENCOUNTER — HOME CARE VISIT (OUTPATIENT)
Dept: SCHEDULING | Facility: HOME HEALTH | Age: 69
End: 2019-07-12
Payer: MEDICARE

## 2019-07-12 PROCEDURE — 400013 HH SOC

## 2019-07-12 PROCEDURE — A6248 HYDROGEL DRSG GEL FILLER: HCPCS

## 2019-07-12 PROCEDURE — A9270 NON-COVERED ITEM OR SERVICE: HCPCS

## 2019-07-12 PROCEDURE — A6223 GAUZE >16<=48 NO W/SAL W/O B: HCPCS

## 2019-07-12 PROCEDURE — A6446 CONFORM BAND S W>=3" <5"/YD: HCPCS

## 2019-07-12 PROCEDURE — A4927 NON-STERILE GLOVES: HCPCS

## 2019-07-12 PROCEDURE — A6449 LT COMPRES BAND >=3" <5"/YD: HCPCS

## 2019-07-12 PROCEDURE — 3331090002 HH PPS REVENUE DEBIT

## 2019-07-12 PROCEDURE — A4216 STERILE WATER/SALINE, 10 ML: HCPCS

## 2019-07-12 PROCEDURE — 3331090001 HH PPS REVENUE CREDIT

## 2019-07-12 PROCEDURE — G0299 HHS/HOSPICE OF RN EA 15 MIN: HCPCS

## 2019-07-13 ENCOUNTER — HOME CARE VISIT (OUTPATIENT)
Dept: SCHEDULING | Facility: HOME HEALTH | Age: 69
End: 2019-07-13
Payer: MEDICARE

## 2019-07-13 VITALS
TEMPERATURE: 97.9 F | HEART RATE: 68 BPM | RESPIRATION RATE: 16 BRPM | DIASTOLIC BLOOD PRESSURE: 70 MMHG | SYSTOLIC BLOOD PRESSURE: 150 MMHG

## 2019-07-13 VITALS
RESPIRATION RATE: 14 BRPM | SYSTOLIC BLOOD PRESSURE: 114 MMHG | TEMPERATURE: 97.4 F | DIASTOLIC BLOOD PRESSURE: 63 MMHG | OXYGEN SATURATION: 96 % | HEART RATE: 68 BPM

## 2019-07-13 PROCEDURE — G0299 HHS/HOSPICE OF RN EA 15 MIN: HCPCS

## 2019-07-13 PROCEDURE — 3331090002 HH PPS REVENUE DEBIT

## 2019-07-13 PROCEDURE — 3331090001 HH PPS REVENUE CREDIT

## 2019-07-14 ENCOUNTER — HOME CARE VISIT (OUTPATIENT)
Dept: HOME HEALTH SERVICES | Facility: HOME HEALTH | Age: 69
End: 2019-07-14
Payer: MEDICARE

## 2019-07-14 VITALS
TEMPERATURE: 98 F | HEART RATE: 68 BPM | DIASTOLIC BLOOD PRESSURE: 76 MMHG | OXYGEN SATURATION: 98 % | SYSTOLIC BLOOD PRESSURE: 118 MMHG

## 2019-07-14 PROCEDURE — G0299 HHS/HOSPICE OF RN EA 15 MIN: HCPCS

## 2019-07-14 PROCEDURE — 3331090002 HH PPS REVENUE DEBIT

## 2019-07-14 PROCEDURE — 3331090001 HH PPS REVENUE CREDIT

## 2019-07-15 ENCOUNTER — HOME CARE VISIT (OUTPATIENT)
Dept: HOME HEALTH SERVICES | Facility: HOME HEALTH | Age: 69
End: 2019-07-15
Payer: MEDICARE

## 2019-07-15 PROCEDURE — 3331090001 HH PPS REVENUE CREDIT

## 2019-07-15 PROCEDURE — 3331090002 HH PPS REVENUE DEBIT

## 2019-07-16 ENCOUNTER — HOME CARE VISIT (OUTPATIENT)
Dept: SCHEDULING | Facility: HOME HEALTH | Age: 69
End: 2019-07-16
Payer: MEDICARE

## 2019-07-16 VITALS
RESPIRATION RATE: 17 BRPM | HEART RATE: 88 BPM | SYSTOLIC BLOOD PRESSURE: 130 MMHG | OXYGEN SATURATION: 98 % | DIASTOLIC BLOOD PRESSURE: 78 MMHG | TEMPERATURE: 98 F

## 2019-07-16 VITALS
HEART RATE: 74 BPM | DIASTOLIC BLOOD PRESSURE: 72 MMHG | OXYGEN SATURATION: 97 % | RESPIRATION RATE: 14 BRPM | TEMPERATURE: 98.8 F | SYSTOLIC BLOOD PRESSURE: 120 MMHG

## 2019-07-16 PROCEDURE — G0151 HHCP-SERV OF PT,EA 15 MIN: HCPCS

## 2019-07-16 PROCEDURE — 3331090002 HH PPS REVENUE DEBIT

## 2019-07-16 PROCEDURE — G0299 HHS/HOSPICE OF RN EA 15 MIN: HCPCS

## 2019-07-16 PROCEDURE — 3331090001 HH PPS REVENUE CREDIT

## 2019-07-17 PROCEDURE — 3331090001 HH PPS REVENUE CREDIT

## 2019-07-17 PROCEDURE — 3331090002 HH PPS REVENUE DEBIT

## 2019-07-18 ENCOUNTER — HOME CARE VISIT (OUTPATIENT)
Dept: SCHEDULING | Facility: HOME HEALTH | Age: 69
End: 2019-07-18
Payer: MEDICARE

## 2019-07-18 PROCEDURE — 3331090001 HH PPS REVENUE CREDIT

## 2019-07-18 PROCEDURE — 3331090002 HH PPS REVENUE DEBIT

## 2019-07-18 PROCEDURE — G0299 HHS/HOSPICE OF RN EA 15 MIN: HCPCS

## 2019-07-19 VITALS
OXYGEN SATURATION: 96 % | HEART RATE: 72 BPM | TEMPERATURE: 98.1 F | DIASTOLIC BLOOD PRESSURE: 64 MMHG | RESPIRATION RATE: 14 BRPM | SYSTOLIC BLOOD PRESSURE: 124 MMHG

## 2019-07-19 PROCEDURE — 3331090002 HH PPS REVENUE DEBIT

## 2019-07-19 PROCEDURE — 3331090001 HH PPS REVENUE CREDIT

## 2019-07-20 PROCEDURE — 3331090002 HH PPS REVENUE DEBIT

## 2019-07-20 PROCEDURE — 3331090001 HH PPS REVENUE CREDIT

## 2019-07-21 ENCOUNTER — HOME CARE VISIT (OUTPATIENT)
Dept: SCHEDULING | Facility: HOME HEALTH | Age: 69
End: 2019-07-21
Payer: MEDICARE

## 2019-07-21 VITALS
HEART RATE: 82 BPM | RESPIRATION RATE: 18 BRPM | OXYGEN SATURATION: 97 % | SYSTOLIC BLOOD PRESSURE: 126 MMHG | TEMPERATURE: 98.1 F | DIASTOLIC BLOOD PRESSURE: 78 MMHG

## 2019-07-21 PROCEDURE — 3331090001 HH PPS REVENUE CREDIT

## 2019-07-21 PROCEDURE — 3331090002 HH PPS REVENUE DEBIT

## 2019-07-21 PROCEDURE — G0299 HHS/HOSPICE OF RN EA 15 MIN: HCPCS

## 2019-07-22 PROCEDURE — 3331090002 HH PPS REVENUE DEBIT

## 2019-07-22 PROCEDURE — 3331090001 HH PPS REVENUE CREDIT

## 2019-07-23 ENCOUNTER — HOME CARE VISIT (OUTPATIENT)
Dept: SCHEDULING | Facility: HOME HEALTH | Age: 69
End: 2019-07-23
Payer: MEDICARE

## 2019-07-23 PROCEDURE — G0299 HHS/HOSPICE OF RN EA 15 MIN: HCPCS

## 2019-07-23 PROCEDURE — 3331090002 HH PPS REVENUE DEBIT

## 2019-07-23 PROCEDURE — 3331090001 HH PPS REVENUE CREDIT

## 2019-07-23 PROCEDURE — A6248 HYDROGEL DRSG GEL FILLER: HCPCS

## 2019-07-23 PROCEDURE — A6446 CONFORM BAND S W>=3" <5"/YD: HCPCS

## 2019-07-24 VITALS
TEMPERATURE: 98.7 F | DIASTOLIC BLOOD PRESSURE: 68 MMHG | SYSTOLIC BLOOD PRESSURE: 116 MMHG | HEART RATE: 70 BPM | RESPIRATION RATE: 14 BRPM | OXYGEN SATURATION: 98 %

## 2019-07-24 PROCEDURE — 3331090002 HH PPS REVENUE DEBIT

## 2019-07-24 PROCEDURE — 3331090001 HH PPS REVENUE CREDIT

## 2019-07-25 PROCEDURE — 3331090002 HH PPS REVENUE DEBIT

## 2019-07-25 PROCEDURE — 3331090001 HH PPS REVENUE CREDIT

## 2019-07-26 PROCEDURE — 3331090002 HH PPS REVENUE DEBIT

## 2019-07-26 PROCEDURE — 3331090001 HH PPS REVENUE CREDIT

## 2019-07-27 ENCOUNTER — HOME CARE VISIT (OUTPATIENT)
Dept: SCHEDULING | Facility: HOME HEALTH | Age: 69
End: 2019-07-27
Payer: MEDICARE

## 2019-07-27 VITALS
OXYGEN SATURATION: 98 % | TEMPERATURE: 97.4 F | HEART RATE: 64 BPM | SYSTOLIC BLOOD PRESSURE: 140 MMHG | DIASTOLIC BLOOD PRESSURE: 70 MMHG | RESPIRATION RATE: 16 BRPM

## 2019-07-27 PROCEDURE — G0299 HHS/HOSPICE OF RN EA 15 MIN: HCPCS

## 2019-07-27 PROCEDURE — 3331090001 HH PPS REVENUE CREDIT

## 2019-07-27 PROCEDURE — 3331090002 HH PPS REVENUE DEBIT

## 2019-07-28 PROCEDURE — 3331090001 HH PPS REVENUE CREDIT

## 2019-07-28 PROCEDURE — 3331090002 HH PPS REVENUE DEBIT

## 2019-07-29 ENCOUNTER — HOME CARE VISIT (OUTPATIENT)
Dept: SCHEDULING | Facility: HOME HEALTH | Age: 69
End: 2019-07-29
Payer: MEDICARE

## 2019-07-29 PROCEDURE — G0299 HHS/HOSPICE OF RN EA 15 MIN: HCPCS

## 2019-07-29 PROCEDURE — 3331090001 HH PPS REVENUE CREDIT

## 2019-07-29 PROCEDURE — 3331090002 HH PPS REVENUE DEBIT

## 2019-07-30 VITALS
SYSTOLIC BLOOD PRESSURE: 118 MMHG | RESPIRATION RATE: 14 BRPM | DIASTOLIC BLOOD PRESSURE: 70 MMHG | TEMPERATURE: 99.6 F | HEART RATE: 64 BPM | OXYGEN SATURATION: 98 %

## 2019-07-30 PROCEDURE — 3331090001 HH PPS REVENUE CREDIT

## 2019-07-30 PROCEDURE — 3331090002 HH PPS REVENUE DEBIT

## 2019-07-31 ENCOUNTER — HOME CARE VISIT (OUTPATIENT)
Dept: SCHEDULING | Facility: HOME HEALTH | Age: 69
End: 2019-07-31
Payer: MEDICARE

## 2019-07-31 PROCEDURE — 3331090001 HH PPS REVENUE CREDIT

## 2019-07-31 PROCEDURE — G0299 HHS/HOSPICE OF RN EA 15 MIN: HCPCS

## 2019-07-31 PROCEDURE — 3331090002 HH PPS REVENUE DEBIT

## 2019-08-01 PROCEDURE — 3331090001 HH PPS REVENUE CREDIT

## 2019-08-01 PROCEDURE — 3331090002 HH PPS REVENUE DEBIT

## 2019-08-02 ENCOUNTER — HOME CARE VISIT (OUTPATIENT)
Dept: HOME HEALTH SERVICES | Facility: HOME HEALTH | Age: 69
End: 2019-08-02
Payer: MEDICARE

## 2019-08-02 PROCEDURE — 3331090001 HH PPS REVENUE CREDIT

## 2019-08-02 PROCEDURE — 3331090002 HH PPS REVENUE DEBIT

## 2019-08-03 PROCEDURE — 3331090001 HH PPS REVENUE CREDIT

## 2019-08-03 PROCEDURE — 3331090002 HH PPS REVENUE DEBIT

## 2019-08-04 PROCEDURE — 3331090002 HH PPS REVENUE DEBIT

## 2019-08-04 PROCEDURE — 3331090001 HH PPS REVENUE CREDIT

## 2019-08-05 PROCEDURE — 3331090001 HH PPS REVENUE CREDIT

## 2019-08-05 PROCEDURE — 3331090002 HH PPS REVENUE DEBIT

## 2019-08-06 ENCOUNTER — HOME CARE VISIT (OUTPATIENT)
Dept: SCHEDULING | Facility: HOME HEALTH | Age: 69
End: 2019-08-06
Payer: MEDICARE

## 2019-08-06 PROCEDURE — 3331090001 HH PPS REVENUE CREDIT

## 2019-08-06 PROCEDURE — G0299 HHS/HOSPICE OF RN EA 15 MIN: HCPCS

## 2019-08-06 PROCEDURE — 3331090002 HH PPS REVENUE DEBIT

## 2019-08-06 PROCEDURE — A6216 NON-STERILE GAUZE<=16 SQ IN: HCPCS

## 2019-08-06 PROCEDURE — A6446 CONFORM BAND S W>=3" <5"/YD: HCPCS

## 2019-08-07 VITALS
DIASTOLIC BLOOD PRESSURE: 64 MMHG | HEART RATE: 78 BPM | RESPIRATION RATE: 14 BRPM | OXYGEN SATURATION: 99 % | TEMPERATURE: 98.6 F | SYSTOLIC BLOOD PRESSURE: 112 MMHG

## 2019-08-07 PROCEDURE — 3331090001 HH PPS REVENUE CREDIT

## 2019-08-07 PROCEDURE — 3331090002 HH PPS REVENUE DEBIT

## 2019-08-08 PROCEDURE — A6216 NON-STERILE GAUZE<=16 SQ IN: HCPCS

## 2019-08-08 PROCEDURE — A6446 CONFORM BAND S W>=3" <5"/YD: HCPCS

## 2020-01-27 ENCOUNTER — HOSPITAL ENCOUNTER (INPATIENT)
Age: 70
LOS: 8 days | Discharge: HOME HEALTH | DRG: 504 | End: 2020-02-04
Attending: PODIATRIST | Admitting: PODIATRIST
Payer: MEDICARE

## 2020-01-27 PROBLEM — L03.119 CELLULITIS AND ABSCESS OF LOWER EXTREMITY: Status: ACTIVE | Noted: 2020-01-27

## 2020-01-27 PROBLEM — I01.1 RHEUMATOID AORTITIS: Status: ACTIVE | Noted: 2020-01-27

## 2020-01-27 PROBLEM — L02.419 CELLULITIS AND ABSCESS OF LOWER EXTREMITY: Status: ACTIVE | Noted: 2020-01-27

## 2020-01-27 PROBLEM — M86.172 OSTEOMYELITIS OF ANKLE OR FOOT, ACUTE, LEFT (HCC): Status: ACTIVE | Noted: 2020-01-27

## 2020-01-27 LAB
ANION GAP SERPL CALC-SCNC: 7 MMOL/L (ref 3–18)
BASOPHILS # BLD: 0 K/UL (ref 0–0.1)
BASOPHILS NFR BLD: 0 % (ref 0–2)
BUN SERPL-MCNC: 25 MG/DL (ref 7–18)
BUN/CREAT SERPL: 25 (ref 12–20)
CALCIUM SERPL-MCNC: 9.8 MG/DL (ref 8.5–10.1)
CHLORIDE SERPL-SCNC: 104 MMOL/L (ref 100–111)
CO2 SERPL-SCNC: 27 MMOL/L (ref 21–32)
CREAT SERPL-MCNC: 1.02 MG/DL (ref 0.6–1.3)
CRP SERPL-MCNC: 4.1 MG/DL (ref 0–0.3)
DIFFERENTIAL METHOD BLD: ABNORMAL
EOSINOPHIL # BLD: 0.7 K/UL (ref 0–0.4)
EOSINOPHIL NFR BLD: 7 % (ref 0–5)
ERYTHROCYTE [DISTWIDTH] IN BLOOD BY AUTOMATED COUNT: 16.1 % (ref 11.6–14.5)
ERYTHROCYTE [SEDIMENTATION RATE] IN BLOOD: 86 MM/HR (ref 0–20)
GLUCOSE SERPL-MCNC: 88 MG/DL (ref 74–99)
HCT VFR BLD AUTO: 36.6 % (ref 36–48)
HGB BLD-MCNC: 11.6 G/DL (ref 13–16)
LYMPHOCYTES # BLD: 1.7 K/UL (ref 0.9–3.6)
LYMPHOCYTES NFR BLD: 17 % (ref 21–52)
MCH RBC QN AUTO: 29.1 PG (ref 24–34)
MCHC RBC AUTO-ENTMCNC: 31.7 G/DL (ref 31–37)
MCV RBC AUTO: 92 FL (ref 74–97)
MONOCYTES # BLD: 0.9 K/UL (ref 0.05–1.2)
MONOCYTES NFR BLD: 8 % (ref 3–10)
NEUTS SEG # BLD: 7 K/UL (ref 1.8–8)
NEUTS SEG NFR BLD: 68 % (ref 40–73)
PLATELET # BLD AUTO: 664 K/UL (ref 135–420)
PMV BLD AUTO: 9.1 FL (ref 9.2–11.8)
POTASSIUM SERPL-SCNC: 4.4 MMOL/L (ref 3.5–5.5)
RBC # BLD AUTO: 3.98 M/UL (ref 4.7–5.5)
SODIUM SERPL-SCNC: 138 MMOL/L (ref 136–145)
WBC # BLD AUTO: 10.3 K/UL (ref 4.6–13.2)

## 2020-01-27 PROCEDURE — 36415 COLL VENOUS BLD VENIPUNCTURE: CPT

## 2020-01-27 PROCEDURE — 85652 RBC SED RATE AUTOMATED: CPT

## 2020-01-27 PROCEDURE — 87070 CULTURE OTHR SPECIMN AEROBIC: CPT

## 2020-01-27 PROCEDURE — 80048 BASIC METABOLIC PNL TOTAL CA: CPT

## 2020-01-27 PROCEDURE — 87186 SC STD MICRODIL/AGAR DIL: CPT

## 2020-01-27 PROCEDURE — 86140 C-REACTIVE PROTEIN: CPT

## 2020-01-27 PROCEDURE — 85025 COMPLETE CBC W/AUTO DIFF WBC: CPT

## 2020-01-27 PROCEDURE — 74011000258 HC RX REV CODE- 258: Performed by: PODIATRIST

## 2020-01-27 PROCEDURE — 65270000029 HC RM PRIVATE

## 2020-01-27 PROCEDURE — 74011250636 HC RX REV CODE- 250/636: Performed by: PODIATRIST

## 2020-01-27 PROCEDURE — 74011250636 HC RX REV CODE- 250/636: Performed by: HOSPITALIST

## 2020-01-27 PROCEDURE — 87077 CULTURE AEROBIC IDENTIFY: CPT

## 2020-01-27 RX ORDER — OXYCODONE AND ACETAMINOPHEN 7.5; 325 MG/1; MG/1
1 TABLET ORAL
Status: DISCONTINUED | OUTPATIENT
Start: 2020-01-27 | End: 2020-02-04 | Stop reason: HOSPADM

## 2020-01-27 RX ORDER — SODIUM CHLORIDE 0.9 % (FLUSH) 0.9 %
5-40 SYRINGE (ML) INJECTION AS NEEDED
Status: DISCONTINUED | OUTPATIENT
Start: 2020-01-27 | End: 2020-02-04 | Stop reason: HOSPADM

## 2020-01-27 RX ORDER — SODIUM CHLORIDE 0.9 % (FLUSH) 0.9 %
5-40 SYRINGE (ML) INJECTION EVERY 8 HOURS
Status: DISCONTINUED | OUTPATIENT
Start: 2020-01-27 | End: 2020-02-04 | Stop reason: HOSPADM

## 2020-01-27 RX ORDER — CEPHALEXIN 500 MG/1
500 CAPSULE ORAL 4 TIMES DAILY
COMMUNITY
End: 2020-12-08 | Stop reason: ALTCHOICE

## 2020-01-27 RX ORDER — HYDROMORPHONE HYDROCHLORIDE 1 MG/ML
1 INJECTION, SOLUTION INTRAMUSCULAR; INTRAVENOUS; SUBCUTANEOUS
Status: DISCONTINUED | OUTPATIENT
Start: 2020-01-27 | End: 2020-01-29

## 2020-01-27 RX ORDER — ENOXAPARIN SODIUM 100 MG/ML
40 INJECTION SUBCUTANEOUS EVERY 24 HOURS
Status: DISCONTINUED | OUTPATIENT
Start: 2020-01-27 | End: 2020-02-04 | Stop reason: HOSPADM

## 2020-01-27 RX ADMIN — PIPERACILLIN AND TAZOBACTAM 3.38 G: 3; .375 INJECTION, POWDER, LYOPHILIZED, FOR SOLUTION INTRAVENOUS at 18:22

## 2020-01-27 RX ADMIN — Medication 10 ML: at 22:00

## 2020-01-27 RX ADMIN — ENOXAPARIN SODIUM 40 MG: 40 INJECTION, SOLUTION INTRAVENOUS; SUBCUTANEOUS at 19:25

## 2020-01-27 RX ADMIN — VANCOMYCIN HYDROCHLORIDE 1500 MG: 1.5 INJECTION, POWDER, LYOPHILIZED, FOR SOLUTION INTRAVENOUS at 21:44

## 2020-01-27 RX ADMIN — Medication 10 ML: at 19:20

## 2020-01-27 NOTE — CONSULTS
Medicine Consult    Patient:  Veronique Gandara 77 y.o. male  Asked to evaluate patient by Dr. Cristi Mcdaniel  Primary Care Provider:  Berhane Nick MD  Date of Admission:  1/27/2020  Reason for Consult: medical management        Assessment/Plan     Patient Active Problem List   Diagnosis Code    Enlarged prostate with lower urinary tract symptoms (LUTS) N40.1    Calculus of both kidneys N20.0    Rheumatoid arthritis ([address] M06.9    Essential hypertension I10    GERD (gastroesophageal reflux disease) K21.9    Disorder of bone and cartilage, unspecified M89.9, M94.9    Depression F32.9    Aortic stenosis I35.0    Gout M10.9    Impaired fasting blood sugar R73.01    Male erectile dysfunction, unspecified N52.9    Anxiety state F41.1    Chronic allergic rhinitis J30.9    History of aortic valve replacement Z95.2    Deviated nasal septum J34.2    Chronic maxillary sinusitis J32.0    Multiple-type hyperlipidemia E78.2    SHAILESH (obstructive sleep apnea) G47.33    Sensorineural hearing loss (SNHL) of both ears H90.3    Cellulitis L03.90    Cellulitis of foot, left L03. 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 ID consulted. Wound care consulted. Hypertension-  Continue with home medications monitor blood pressure. History of rheumatoid arthritis-  He is on Enbrel at home which was stopped because of wound healing. We will continue to hold Enbrel at this point. History of aortic valve replacement-  Bovine not on any anticoagulation. GERD-  Continue with PPI    Gout-  Continue with allopurinol. BPH-  Continue with Flomax    DVT prophylaxis with Lovenox    Thank you for allowing us to participate in this patients care.   HPI: CC:  Tone Laurent is a 77 y.o. male with past medical history significant for RA, hypertension, aortic valve replacement, hyperlipidemia, gout, BPH is being directly admitted from by Dr. Tran Nash from his clinic. Patient had left foot surgery for his rheumatoid arthritis. He had metal plates placed. He was visiting Dr. Tran Nash for follow-up today and it was noticed that he has open wound along the incision site and he has metal plate exposed. He also notes that he has some increased serosanguineous drainage from the wound. He denies any fever, chills, nausea, vomiting, chest pain, shortness of breath. He does report that his his Enbrel was stopped before surgery and he had increased to joint pains especially in his fingers and he required to take a few doses of Enbrel after surgery. Past Medical History:   Diagnosis Date    Anxiety     Anxiety     Aortic stenosis     Aortic stenosis     Arthritis     BPH (benign prostatic hypertrophy)     Depression     Depression     GERD (gastroesophageal reflux disease)     Gout     HTN (hypertension)     Renal calculi      Past Surgical History:   Procedure Laterality Date    HX AORTIC VALVE REPLACEMENT      HX COLONOSCOPY      HX HERNIA REPAIR      HX KNEE REPLACEMENT      HX LITHOTRIPSY  01/17/2017    SCPH: ESWL- 7 mm x 10 mm Right UPJ / Renal Pelvis stone, 2500 shocks max intensity level 5 Dr. Ezra Bunn      Social History     Tobacco Use    Smoking status: Never Smoker    Smokeless tobacco: Never Used   Substance Use Topics    Alcohol use: Yes    Drug use: Not on file     Family History   Problem Relation Age of Onset    Heart Disease Father      No current facility-administered medications on file prior to encounter. Current Outpatient Medications on File Prior to Encounter   Medication Sig Dispense Refill    cephALEXin (KEFLEX) 500 mg capsule Take 500 mg by mouth four (4) times daily.       aspirin delayed-release 81 mg tablet Take 81 mg by mouth daily.  lisinopril (PRINIVIL, ZESTRIL) 5 mg tablet Take 5 mg by mouth daily. Take once in the Morning      predniSONE (DELTASONE) 5 mg tablet Take 5 mg by mouth daily.  atorvastatin (LIPITOR) 80 mg tablet Take 80 mg by mouth nightly. Take once in the Evening      levocetirizine (XYZAL) 5 mg tablet Take 5 mg by mouth daily. Take once in the Evening      montelukast (SINGULAIR) 10 mg tablet Take 10 mg by mouth daily. Take once in the morning      tamsulosin (FLOMAX) 0.4 mg capsule Take 1 Cap by mouth daily. 1 Cap 0    amLODIPine (NORVASC) 10 mg tablet 10 mg.      omeprazole (PRILOSEC) 10 mg capsule Take 20 mg by mouth daily.  sertraline (ZOLOFT) 50 mg tablet Take 100 mg by mouth daily.  ibuprofen (MOTRIN) 800 mg tablet Take 800 mg by mouth two (2) times a day. Indications: pain      ENBREL SURECLICK 50 mg/mL (0.56 mL) injection 50 mg Every Thursday. Take in the evening or before bedtime      allopurinol (ZYLOPRIM) 100 mg tablet Take 100 mg by mouth daily.  metoprolol tartrate (LOPRESSOR) 25 mg tablet Take 25 mg by mouth daily. Once a day in the morning  0    multivitamin (ONE A DAY) tablet Take 1 Tab by Mouth Once a Day.  calcium carbonate (CALTREX) 600 mg (1,500 mg) tablet Take by Mouth Once a Day.  omega 3-dha-epa-fish oil (FISH OIL) 60- mg cap Take 500 mg by mouth daily.  cholecalciferol, vitamin d3, (VITAMIN D3) 400 unit cap Take 400 Units by mouth daily.         No Known Allergies        Review of Systems  Constitutional: No fever, chills, diaphoresis, malaise, fatigue or weight gain/loss or falls  Skin: no itching or rashes  HEENT: no ear discomfort, hearing loss, tinnitus, epistaxis or sore throat  EYES: no blurry vision, double vision or photophobia  CARDIOVASCULAR: no claudication, cp, palpitations, orthopnea, pnd or LE edema  PULMONARY: no cough, wheeze, shortness of breath or sputum production  GI: no nausea, vomiting, diarrhea, abdominal pain, melena, hematemesis or brbpr  : no dysuria, hematuria  MUSCULOSKELETAL:see above  ENDOCRINE: no heat/cold intolerance, polyuria or polydipsia  HEME: no easy bruising or bleeding  NEURO: no unilateral weakness, numbness, tingling or seizures      Physical Exam:      Visit Vitals  /58 (BP 1 Location: Left arm, BP Patient Position: At rest)   Pulse 70   Temp 98.2 °F (36.8 °C)   Resp 16   SpO2 99%     There is no height or weight on file to calculate BMI. Physical Exam:  GEN: well nourished, laying in bed in no acute distress  HEENT: atraumatic, nose normal,oropharynx clear, MMM  NECK: supple, trachea midline, no supraclavicular or submandibular adenopathy noted  EYES: conjuctiva normal, lids with out lesions, PERRL  HEART: RRR loud S2, pmi nondisplaced, pulses 2+ distally  LUNGS: equal chest wall expansion, cta bl with out wheezes/rales or rhonchi  AB: soft, +BS, nt/nd no organomegaly  NEURO: alert, awake and oriented x3, gait not assessed, cranial nerves intact, strength 5/5 bl UE and LE, sensation intact, reflexes nonpathological  SKIN: open wound left wound, now with dressing. Arthritic changes on fingers and toes. Laboratory Studies:    Recent Results (from the past 24 hour(s))   CBC WITH AUTOMATED DIFF    Collection Time: 01/27/20  5:18 PM   Result Value Ref Range    WBC 10.3 4.6 - 13.2 K/uL    RBC 3.98 (L) 4.70 - 5.50 M/uL    HGB 11.6 (L) 13.0 - 16.0 g/dL    HCT 36.6 36.0 - 48.0 %    MCV 92.0 74.0 - 97.0 FL    MCH 29.1 24.0 - 34.0 PG    MCHC 31.7 31.0 - 37.0 g/dL    RDW 16.1 (H) 11.6 - 14.5 %    PLATELET 455 (H) 055 - 420 K/uL    MPV 9.1 (L) 9.2 - 11.8 FL    NEUTROPHILS 68 40 - 73 %    LYMPHOCYTES 17 (L) 21 - 52 %    MONOCYTES 8 3 - 10 %    EOSINOPHILS 7 (H) 0 - 5 %    BASOPHILS 0 0 - 2 %    ABS. NEUTROPHILS 7.0 1.8 - 8.0 K/UL    ABS. LYMPHOCYTES 1.7 0.9 - 3.6 K/UL    ABS. MONOCYTES 0.9 0.05 - 1.2 K/UL    ABS. EOSINOPHILS 0.7 (H) 0.0 - 0.4 K/UL    ABS.  BASOPHILS 0.0 0.0 - 0.1 K/UL    DF AUTOMATED     SED RATE (ESR)    Collection Time: 01/27/20  5:18 PM   Result Value Ref Range    Sed rate, automated 86 (H) 0 - 20 mm/hr   METABOLIC PANEL, BASIC    Collection Time: 01/27/20  5:18 PM   Result Value Ref Range    Sodium 138 136 - 145 mmol/L    Potassium 4.4 3.5 - 5.5 mmol/L    Chloride 104 100 - 111 mmol/L    CO2 27 21 - 32 mmol/L    Anion gap 7 3.0 - 18 mmol/L    Glucose 88 74 - 99 mg/dL    BUN 25 (H) 7.0 - 18 MG/DL    Creatinine 1.02 0.6 - 1.3 MG/DL    BUN/Creatinine ratio 25 (H) 12 - 20      GFR est AA >60 >60 ml/min/1.73m2    GFR est non-AA >60 >60 ml/min/1.73m2    Calcium 9.8 8.5 - 10.1 MG/DL

## 2020-01-27 NOTE — PROGRESS NOTES
1600  Pt has arrived on the floor. 36  Spoke with Dr. Andrés Holloway. Stated he will review PTA meds and put in orders. Requested to apply xeroform and 4x4 to foot wound and to put in wound consult for wound vac in the morning. 2006  Dressing change completed. Order for wound consult is in. Pt is stable and resting comfortably with family. Bedside and Verbal shift change report given to Khalif Huitron RN (oncoming nurse) by Wes Granados RN (offgoing nurse). Report included the following information SBAR, Kardex, Intake/Output, MAR and Recent Results.

## 2020-01-28 ENCOUNTER — APPOINTMENT (OUTPATIENT)
Dept: INTERVENTIONAL RADIOLOGY/VASCULAR | Age: 70
DRG: 504 | End: 2020-01-28
Attending: PODIATRIST
Payer: MEDICARE

## 2020-01-28 LAB
ANION GAP SERPL CALC-SCNC: 3 MMOL/L (ref 3–18)
BUN SERPL-MCNC: 23 MG/DL (ref 7–18)
BUN/CREAT SERPL: 23 (ref 12–20)
CALCIUM SERPL-MCNC: 8.8 MG/DL (ref 8.5–10.1)
CHLORIDE SERPL-SCNC: 108 MMOL/L (ref 100–111)
CO2 SERPL-SCNC: 29 MMOL/L (ref 21–32)
CREAT SERPL-MCNC: 0.99 MG/DL (ref 0.6–1.3)
ERYTHROCYTE [DISTWIDTH] IN BLOOD BY AUTOMATED COUNT: 16.2 % (ref 11.6–14.5)
GLUCOSE SERPL-MCNC: 100 MG/DL (ref 74–99)
HCT VFR BLD AUTO: 31.9 % (ref 36–48)
HGB BLD-MCNC: 10.3 G/DL (ref 13–16)
MCH RBC QN AUTO: 29.3 PG (ref 24–34)
MCHC RBC AUTO-ENTMCNC: 32.3 G/DL (ref 31–37)
MCV RBC AUTO: 90.9 FL (ref 74–97)
PLATELET # BLD AUTO: 588 K/UL (ref 135–420)
PMV BLD AUTO: 8.8 FL (ref 9.2–11.8)
POTASSIUM SERPL-SCNC: 4.7 MMOL/L (ref 3.5–5.5)
RBC # BLD AUTO: 3.51 M/UL (ref 4.7–5.5)
SODIUM SERPL-SCNC: 140 MMOL/L (ref 136–145)
WBC # BLD AUTO: 13.5 K/UL (ref 4.6–13.2)

## 2020-01-28 PROCEDURE — 36415 COLL VENOUS BLD VENIPUNCTURE: CPT

## 2020-01-28 PROCEDURE — 74011250636 HC RX REV CODE- 250/636: Performed by: PODIATRIST

## 2020-01-28 PROCEDURE — 80048 BASIC METABOLIC PNL TOTAL CA: CPT

## 2020-01-28 PROCEDURE — 74011250636 HC RX REV CODE- 250/636: Performed by: HOSPITALIST

## 2020-01-28 PROCEDURE — 74011250636 HC RX REV CODE- 250/636

## 2020-01-28 PROCEDURE — 74011250637 HC RX REV CODE- 250/637: Performed by: HOSPITALIST

## 2020-01-28 PROCEDURE — 77030019952 HC CANSTR VAC ASST KCON -B

## 2020-01-28 PROCEDURE — 85027 COMPLETE CBC AUTOMATED: CPT

## 2020-01-28 PROCEDURE — 97605 NEG PRS WND THER DME<=50SQCM: CPT

## 2020-01-28 PROCEDURE — 02HV33Z INSERTION OF INFUSION DEVICE INTO SUPERIOR VENA CAVA, PERCUTANEOUS APPROACH: ICD-10-PCS | Performed by: RADIOLOGY

## 2020-01-28 PROCEDURE — 74011000250 HC RX REV CODE- 250: Performed by: RADIOLOGY

## 2020-01-28 PROCEDURE — 65270000029 HC RM PRIVATE

## 2020-01-28 PROCEDURE — 74011250637 HC RX REV CODE- 250/637: Performed by: PODIATRIST

## 2020-01-28 PROCEDURE — 74011000258 HC RX REV CODE- 258: Performed by: PODIATRIST

## 2020-01-28 PROCEDURE — C1751 CATH, INF, PER/CENT/MIDLINE: HCPCS

## 2020-01-28 PROCEDURE — 77030019934 HC DRSG VAC ASST KCON -B

## 2020-01-28 RX ORDER — LIDOCAINE HYDROCHLORIDE 10 MG/ML
1-20 INJECTION INFILTRATION; PERINEURAL
Status: DISCONTINUED | OUTPATIENT
Start: 2020-01-28 | End: 2020-01-30 | Stop reason: ALTCHOICE

## 2020-01-28 RX ORDER — MONTELUKAST SODIUM 10 MG/1
10 TABLET ORAL DAILY
Status: DISCONTINUED | OUTPATIENT
Start: 2020-01-28 | End: 2020-02-04 | Stop reason: HOSPADM

## 2020-01-28 RX ORDER — LORATADINE 10 MG/1
10 TABLET ORAL DAILY
Status: DISCONTINUED | OUTPATIENT
Start: 2020-01-28 | End: 2020-02-04 | Stop reason: HOSPADM

## 2020-01-28 RX ORDER — ATORVASTATIN CALCIUM 20 MG/1
80 TABLET, FILM COATED ORAL
Status: DISCONTINUED | OUTPATIENT
Start: 2020-01-28 | End: 2020-02-04 | Stop reason: HOSPADM

## 2020-01-28 RX ORDER — ASPIRIN 81 MG/1
81 TABLET ORAL DAILY
Status: DISCONTINUED | OUTPATIENT
Start: 2020-01-28 | End: 2020-02-04 | Stop reason: HOSPADM

## 2020-01-28 RX ORDER — CYANOCOBALAMIN (VITAMIN B-12) 500 MCG
400 TABLET ORAL DAILY
Status: DISCONTINUED | OUTPATIENT
Start: 2020-01-28 | End: 2020-02-04 | Stop reason: HOSPADM

## 2020-01-28 RX ORDER — HEPARIN SODIUM 200 [USP'U]/100ML
500 INJECTION, SOLUTION INTRAVENOUS
Status: COMPLETED | OUTPATIENT
Start: 2020-01-28 | End: 2020-01-28

## 2020-01-28 RX ORDER — CALCIUM CARBONATE 500(1250)
500 TABLET ORAL 2 TIMES DAILY WITH MEALS
Status: DISCONTINUED | OUTPATIENT
Start: 2020-01-28 | End: 2020-02-04 | Stop reason: HOSPADM

## 2020-01-28 RX ORDER — LISINOPRIL 5 MG/1
5 TABLET ORAL DAILY
Status: DISCONTINUED | OUTPATIENT
Start: 2020-01-28 | End: 2020-02-04 | Stop reason: HOSPADM

## 2020-01-28 RX ORDER — METOPROLOL TARTRATE 25 MG/1
25 TABLET, FILM COATED ORAL DAILY
Status: DISCONTINUED | OUTPATIENT
Start: 2020-01-28 | End: 2020-02-04 | Stop reason: HOSPADM

## 2020-01-28 RX ORDER — ALLOPURINOL 100 MG/1
100 TABLET ORAL DAILY
Status: DISCONTINUED | OUTPATIENT
Start: 2020-01-28 | End: 2020-02-04 | Stop reason: HOSPADM

## 2020-01-28 RX ORDER — PANTOPRAZOLE SODIUM 40 MG/1
40 TABLET, DELAYED RELEASE ORAL
Status: DISCONTINUED | OUTPATIENT
Start: 2020-01-28 | End: 2020-02-04 | Stop reason: HOSPADM

## 2020-01-28 RX ORDER — HEPARIN SODIUM (PORCINE) LOCK FLUSH IV SOLN 100 UNIT/ML 100 UNIT/ML
SOLUTION INTRAVENOUS
Status: COMPLETED
Start: 2020-01-28 | End: 2020-01-28

## 2020-01-28 RX ORDER — AMLODIPINE BESYLATE 5 MG/1
10 TABLET ORAL DAILY
Status: DISCONTINUED | OUTPATIENT
Start: 2020-01-28 | End: 2020-02-04 | Stop reason: HOSPADM

## 2020-01-28 RX ORDER — HEPARIN SODIUM (PORCINE) LOCK FLUSH IV SOLN 100 UNIT/ML 100 UNIT/ML
500 SOLUTION INTRAVENOUS
Status: DISCONTINUED | OUTPATIENT
Start: 2020-01-28 | End: 2020-02-04 | Stop reason: HOSPADM

## 2020-01-28 RX ORDER — HEPARIN SODIUM 200 [USP'U]/100ML
INJECTION, SOLUTION INTRAVENOUS
Status: COMPLETED
Start: 2020-01-28 | End: 2020-01-28

## 2020-01-28 RX ORDER — SERTRALINE HYDROCHLORIDE 50 MG/1
100 TABLET, FILM COATED ORAL DAILY
Status: DISCONTINUED | OUTPATIENT
Start: 2020-01-28 | End: 2020-02-04 | Stop reason: HOSPADM

## 2020-01-28 RX ORDER — TAMSULOSIN HYDROCHLORIDE 0.4 MG/1
0.4 CAPSULE ORAL DAILY
Status: DISCONTINUED | OUTPATIENT
Start: 2020-01-28 | End: 2020-02-04 | Stop reason: HOSPADM

## 2020-01-28 RX ADMIN — METOPROLOL TARTRATE 25 MG: 25 TABLET ORAL at 11:06

## 2020-01-28 RX ADMIN — Medication 81 MG: at 08:53

## 2020-01-28 RX ADMIN — OXYCODONE HYDROCHLORIDE AND ACETAMINOPHEN 1 TABLET: 7.5; 325 TABLET ORAL at 11:06

## 2020-01-28 RX ADMIN — Medication 10 ML: at 14:00

## 2020-01-28 RX ADMIN — TAMSULOSIN HYDROCHLORIDE 0.4 MG: 0.4 CAPSULE ORAL at 11:06

## 2020-01-28 RX ADMIN — OXYCODONE HYDROCHLORIDE AND ACETAMINOPHEN 1 TABLET: 7.5; 325 TABLET ORAL at 19:38

## 2020-01-28 RX ADMIN — PIPERACILLIN AND TAZOBACTAM 3.38 G: 3; .375 INJECTION, POWDER, LYOPHILIZED, FOR SOLUTION INTRAVENOUS at 23:06

## 2020-01-28 RX ADMIN — ENOXAPARIN SODIUM 40 MG: 40 INJECTION, SOLUTION INTRAVENOUS; SUBCUTANEOUS at 18:39

## 2020-01-28 RX ADMIN — SERTRALINE HYDROCHLORIDE 100 MG: 50 TABLET ORAL at 12:26

## 2020-01-28 RX ADMIN — HEPARIN SODIUM (PORCINE) LOCK FLUSH IV SOLN 100 UNIT/ML 500 UNITS: 100 SOLUTION at 10:08

## 2020-01-28 RX ADMIN — MONTELUKAST 10 MG: 10 TABLET, FILM COATED ORAL at 11:05

## 2020-01-28 RX ADMIN — CHOLECALCIFEROL TAB 10 MCG (400 UNIT) 1 TABLET: 10 TAB at 12:08

## 2020-01-28 RX ADMIN — PIPERACILLIN AND TAZOBACTAM 3.38 G: 3; .375 INJECTION, POWDER, LYOPHILIZED, FOR SOLUTION INTRAVENOUS at 11:10

## 2020-01-28 RX ADMIN — Medication 1 CAPSULE: at 12:08

## 2020-01-28 RX ADMIN — CALCIUM 500 MG: 500 TABLET ORAL at 17:39

## 2020-01-28 RX ADMIN — LIDOCAINE HYDROCHLORIDE 5 ML: 10 INJECTION, SOLUTION INFILTRATION; PERINEURAL at 10:07

## 2020-01-28 RX ADMIN — CALCIUM 500 MG: 500 TABLET ORAL at 08:53

## 2020-01-28 RX ADMIN — HYDROMORPHONE HYDROCHLORIDE 1 MG: 1 INJECTION, SOLUTION INTRAMUSCULAR; INTRAVENOUS; SUBCUTANEOUS at 15:15

## 2020-01-28 RX ADMIN — ATORVASTATIN CALCIUM 80 MG: 20 TABLET, FILM COATED ORAL at 23:00

## 2020-01-28 RX ADMIN — HYDROMORPHONE HYDROCHLORIDE 1 MG: 1 INJECTION, SOLUTION INTRAMUSCULAR; INTRAVENOUS; SUBCUTANEOUS at 18:25

## 2020-01-28 RX ADMIN — VANCOMYCIN HYDROCHLORIDE 1250 MG: 1.25 INJECTION, POWDER, LYOPHILIZED, FOR SOLUTION INTRAVENOUS at 12:08

## 2020-01-28 RX ADMIN — PANTOPRAZOLE SODIUM 40 MG: 40 TABLET, DELAYED RELEASE ORAL at 11:05

## 2020-01-28 RX ADMIN — ALLOPURINOL 100 MG: 100 TABLET ORAL at 08:53

## 2020-01-28 RX ADMIN — PIPERACILLIN AND TAZOBACTAM 3.38 G: 3; .375 INJECTION, POWDER, LYOPHILIZED, FOR SOLUTION INTRAVENOUS at 17:39

## 2020-01-28 RX ADMIN — Medication 10 ML: at 05:37

## 2020-01-28 RX ADMIN — HEPARIN SODIUM 1000 UNITS: 200 INJECTION, SOLUTION INTRAVENOUS at 10:07

## 2020-01-28 RX ADMIN — LORATADINE 10 MG: 10 TABLET ORAL at 12:08

## 2020-01-28 RX ADMIN — PIPERACILLIN AND TAZOBACTAM 3.38 G: 3; .375 INJECTION, POWDER, LYOPHILIZED, FOR SOLUTION INTRAVENOUS at 05:36

## 2020-01-28 RX ADMIN — LISINOPRIL 5 MG: 5 TABLET ORAL at 11:05

## 2020-01-28 RX ADMIN — PIPERACILLIN AND TAZOBACTAM 3.38 G: 3; .375 INJECTION, POWDER, LYOPHILIZED, FOR SOLUTION INTRAVENOUS at 01:12

## 2020-01-28 RX ADMIN — HYDROMORPHONE HYDROCHLORIDE 1 MG: 1 INJECTION, SOLUTION INTRAMUSCULAR; INTRAVENOUS; SUBCUTANEOUS at 08:46

## 2020-01-28 RX ADMIN — AMLODIPINE BESYLATE 10 MG: 5 TABLET ORAL at 08:53

## 2020-01-28 NOTE — PROGRESS NOTES
Problem: Falls - Risk of  Goal: *Absence of Falls  Description  Document Bishop Bautista Fall Risk and appropriate interventions in the flowsheet.   Outcome: Progressing Towards Goal  Note: Fall Risk Interventions:            Medication Interventions: Teach patient to arise slowly

## 2020-01-28 NOTE — PROGRESS NOTES
Pharmacy Dosing Services: Vancomycin    Consult for Vancomycin Dosing by Pharmacy by Dr. Ramon Andrade provided for this 77y.o. year old male , for indication of cellulitis/Osteomyelitis? Day of Therapy 1    Ht Readings from Last 1 Encounters:   01/27/20 175.3 cm (69\")        Wt Readings from Last 1 Encounters:   01/27/20 80.3 kg (177 lb)        Other Current Antibiotics Zosyn   Serum Creatinine Lab Results   Component Value Date/Time    Creatinine 1.02 01/27/2020 05:18 PM    Creatinine (POC) 0.8 11/08/2016      Creatinine Clearance Estimated Creatinine Clearance: 69.3 mL/min (based on SCr of 1.02 mg/dL). BUN Lab Results   Component Value Date/Time    BUN 25 (H) 01/27/2020 05:18 PM      WBC Lab Results   Component Value Date/Time    WBC 10.3 01/27/2020 05:18 PM      H/H Lab Results   Component Value Date/Time    HGB 11.6 (L) 01/27/2020 05:18 PM      Platelets Lab Results   Component Value Date/Time    PLATELET 919 (H) 62/57/7181 05:18 PM      Temp 98.2 °F (36.8 °C)     Vancomycin loading dose 1500 mg    Follow with maintenance dose of Vancomycin 1250 mg Q12h . Dose calculated to approximate a therapeutic trough of 15- 20 mcg/mL. Pharmacy to follow daily and will make changes to dose and/or frequency based on clinical status.   _________________________________     Pharmacist Ken Hanley

## 2020-01-28 NOTE — PROCEDURES
Interventional Radiology Brief Procedure Note    Performed By:  Betito Soares PA-C    Attending Radiologist: Rosa Romeo MD    Pre-operative Diagnosis:  Need for long term IV antibiotics    Post-operative Diagnosis: Same as pre-op diagnosis    Procedure(s) Performed:  Ultrasound & x-ray guided PICC line placement    Anesthesia:  Local      Findings:  Successful right arm dual lumen 5F PICC line placement. Please see dictated report for details. Complications: None immediate    Estimated Blood Loss:  Minimal    Specimens: None    Condition: Stable    Disposition:  Return to nursing unit. PICC line is ready for immediate use.        Betito Soares PA-C  [facility]  Vascular & Interventional Radiology  1/28/2020

## 2020-01-28 NOTE — H&P
History & Physical      Patient: Rosa Franklin               Sex: male          DOA: 1/27/2020       YOB: 1950      Age:  77 y.o.        LOS:  LOS: 1 day               Subjective:   Rosa Franklin is a 77 y.o. male  who I admitted for post operative wound dehiscence and infection. He underwent 1st MPJ fusion on 1/14/20. He was doing well but presented to my office yesterday with concern about infection. He has no pain. No FCNV.   Medications:     Current Facility-Administered Medications:     allopurinoL (ZYLOPRIM) tablet 100 mg, 100 mg, Oral, DAILY, Brodie Henao MD, 100 mg at 01/28/20 0853    amLODIPine (NORVASC) tablet 10 mg, 10 mg, Oral, DAILY, Brodie Henao MD, 10 mg at 01/28/20 4482    aspirin delayed-release tablet 81 mg, 81 mg, Oral, DAILY, Brodie Henao MD, 81 mg at 01/28/20 0853    atorvastatin (LIPITOR) tablet 80 mg, 80 mg, Oral, QHS, Brodie Henao MD    calcium carbonate (OS-CORBY) tablet 500 mg [elemental], 500 mg, Oral, BID WITH MEALS, Sylvia [facility] d[facility], 500 mg at 01/28/20 2896    cholecalciferol (VITAMIN D3) (400 Units /10 mcg) tablet 1 Tab, 400 Units, Oral, DAILY, Brodie Henao MD, 1 Tab at 01/28/20 1208    loratadine (CLARITIN) tablet 10 mg, 10 mg, Oral, DAILY, Brodie Henao MD, 10 mg at 01/28/20 1208    lisinopril (PRINIVIL, ZESTRIL) tablet 5 mg, 5 mg, Oral, DAILY, Brodie Henao MD, 5 mg at 01/28/20 1105    metoprolol tartrate (LOPRESSOR) tablet 25 mg, 25 mg, Oral, DAILY, Brodie Henao MD, 25 mg at 01/28/20 1106    montelukast (SINGULAIR) tablet 10 mg, 10 mg, Oral, DAILY, Brodie Henao MD, 10 mg at 01/28/20 1105    fish oil-omega-3 fatty acids 340-1,000 mg capsule 1 Cap, 1 Cap, Oral, DAILY, Brodie Henao MD, 1 Cap at 01/28/20 1208    pantoprazole (PROTONIX) tablet 40 mg, 40 mg, Oral, ACB, Brodie Henao MD, 40 mg at 01/28/20 1105    sertraline (ZOLOFT) tablet 100 mg, 100 mg, Oral, DAILY, Brodie Henao MD, 100 mg at 01/28/20 1226    tamsulosin (FLOMAX) capsule 0.4 mg, 0.4 mg, Oral, DAILY, Brodie Henao MD, 0.4 mg at 01/28/20 1106    lidocaine (XYLOCAINE) 10 mg/mL (1 %) injection 1-20 mL, 1-20 mL, SubCUTAneous, RAD CONTINUOUS, Saleem Samaniego MD, 5 mL at 01/28/20 1007    heparin (porcine) 100 unit/mL injection 500 Units, 500 Units, InterCATHeter, Q8H PRN, Ryan Zapata MD, 500 Units at 01/28/20 1008    sodium chloride (NS) flush 5-40 mL, 5-40 mL, IntraVENous, Q8H, Alber, Salvador R, DPM, 10 mL at 01/28/20 0537    sodium chloride (NS) flush 5-40 mL, 5-40 mL, IntraVENous, PRN, Alber, Roxie Elmira, DPM    piperacillin-tazobactam (ZOSYN) 3.375 g in 0.9% sodium chloride (MBP/ADV) 100 mL MBP, 3.375 g, IntraVENous, Q6H, Alber, Roxie Elmira, DPM, Last Rate: 200 mL/hr at 01/28/20 1110, 3.375 g at 01/28/20 1110    HYDROmorphone (PF) (DILAUDID) injection 1 mg, 1 mg, IntraVENous, Q6H PRN, Alber, Roxie Elmira, DPM, 1 mg at 01/28/20 0846    oxyCODONE-acetaminophen (PERCOCET 7.5) 7.5-325 mg per tablet 1 Tab, 1 Tab, Oral, Q4H PRN, Alber, Roxie Elmira, DPM, 1 Tab at 01/28/20 1106    enoxaparin (LOVENOX) injection 40 mg, 40 mg, SubCUTAneous, Q24H, Brodie Henao MD, 40 mg at 01/27/20 1925    Vancomycin-pharmacy to consult, 1 Each, Other, Rx Dosing/Monitoring, Alber, Roxie Elmira, DPM    vancomycin (VANCOCIN) 1,250 mg in 0.9% sodium chloride 250 mL (VIAL-MATE), 1,250 mg, IntraVENous, Q12H, Alber, Roxie Elmira, DPM, 1,250 mg at 01/28/20 1208            Objective:      Visit Vitals  /70 (BP 1 Location: Left arm, BP Patient Position: At rest)   Pulse (!) 106   Temp 99.3 °F (37.4 °C)   Resp 16   Ht 5' 9\" (1.753 m)   Wt 80.3 kg (177 lb)   SpO2 96%   BMI 26.14 kg/m²       Physical Exam:  Wound dehisence of the left foot with exposed  Hardware and screws. Surrounding erythema and edema. Exposed bone  No Abscess or purulence noted.   Palpable pedal pulses bilaterally with immediate cap refill        Labs:  Recent Results (from the past 24 hour(s))   CBC WITH AUTOMATED DIFF    Collection Time: 01/27/20  5:18 PM   Result Value Ref Range    WBC 10.3 4.6 - 13.2 K/uL    RBC 3.98 (L) 4.70 - 5.50 M/uL    HGB 11.6 (L) 13.0 - 16.0 g/dL    HCT 36.6 36.0 - 48.0 %    MCV 92.0 74.0 - 97.0 FL    MCH 29.1 24.0 - 34.0 PG    MCHC 31.7 31.0 - 37.0 g/dL    RDW 16.1 (H) 11.6 - 14.5 %    PLATELET 077 (H) 687 - 420 K/uL    MPV 9.1 (L) 9.2 - 11.8 FL    NEUTROPHILS 68 40 - 73 %    LYMPHOCYTES 17 (L) 21 - 52 %    MONOCYTES 8 3 - 10 %    EOSINOPHILS 7 (H) 0 - 5 %    BASOPHILS 0 0 - 2 %    ABS. NEUTROPHILS 7.0 1.8 - 8.0 K/UL    ABS. LYMPHOCYTES 1.7 0.9 - 3.6 K/UL    ABS. MONOCYTES 0.9 0.05 - 1.2 K/UL    ABS. EOSINOPHILS 0.7 (H) 0.0 - 0.4 K/UL    ABS.  BASOPHILS 0.0 0.0 - 0.1 K/UL    DF AUTOMATED     SED RATE (ESR)    Collection Time: 01/27/20  5:18 PM   Result Value Ref Range    Sed rate, automated 86 (H) 0 - 20 mm/hr   C REACTIVE PROTEIN, QT    Collection Time: 01/27/20  5:18 PM   Result Value Ref Range    C-Reactive protein 4.1 (H) 0 - 0.3 mg/dL   METABOLIC PANEL, BASIC    Collection Time: 01/27/20  5:18 PM   Result Value Ref Range    Sodium 138 136 - 145 mmol/L    Potassium 4.4 3.5 - 5.5 mmol/L    Chloride 104 100 - 111 mmol/L    CO2 27 21 - 32 mmol/L    Anion gap 7 3.0 - 18 mmol/L    Glucose 88 74 - 99 mg/dL    BUN 25 (H) 7.0 - 18 MG/DL    Creatinine 1.02 0.6 - 1.3 MG/DL    BUN/Creatinine ratio 25 (H) 12 - 20      GFR est AA >60 >60 ml/min/1.73m2    GFR est non-AA >60 >60 ml/min/1.73m2    Calcium 9.8 8.5 - 12.4 MG/DL   METABOLIC PANEL, BASIC    Collection Time: 01/28/20  4:50 AM   Result Value Ref Range    Sodium 140 136 - 145 mmol/L    Potassium 4.7 3.5 - 5.5 mmol/L    Chloride 108 100 - 111 mmol/L    CO2 29 21 - 32 mmol/L    Anion gap 3 3.0 - 18 mmol/L    Glucose 100 (H) 74 - 99 mg/dL    BUN 23 (H) 7.0 - 18 MG/DL    Creatinine 0.99 0.6 - 1.3 MG/DL    BUN/Creatinine ratio 23 (H) 12 - 20      GFR est AA >60 >60 ml/min/1.73m2    GFR est non-AA >60 >60 ml/min/1.73m2 Calcium 8.8 8.5 - 10.1 MG/DL   CBC W/O DIFF    Collection Time: 01/28/20  4:50 AM   Result Value Ref Range    WBC 13.5 (H) 4.6 - 13.2 K/uL    RBC 3.51 (L) 4.70 - 5.50 M/uL    HGB 10.3 (L) 13.0 - 16.0 g/dL    HCT 31.9 (L) 36.0 - 48.0 %    MCV 90.9 74.0 - 97.0 FL    MCH 29.3 24.0 - 34.0 PG    MCHC 32.3 31.0 - 37.0 g/dL    RDW 16.2 (H) 11.6 - 14.5 %    PLATELET 787 (H) 191 - 420 K/uL    MPV 8.8 (L) 9.2 - 11.8 FL           Assessment/Plan     Principal Problem:    Cellulitis and abscess of lower extremity (1/27/2020)    Active Problems: Aortic stenosis (12/5/2016)      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 Recommended wound vac and IV abx. Pt with hx of MRSA. Hopeful for salvage of the great toe. His long history of severe RA and poor healing makes this a challenging case. Risk, benefits, and alternatives have been discussed at length with the patient and family.     Rosa Dias DPM  January 28, 2020

## 2020-01-28 NOTE — PROGRESS NOTES
2142-alert and oriented x 4, wife at bedside. Lungs CTA. BS active x 4 quads. IV access to right arm infusing without difficulty. Dressing to left foot CDI. Pain rated 0/10. Shift summary-ambulates independently. Denies pain. Dressing remains intact with no noted drainage.

## 2020-01-28 NOTE — ROUTINE PROCESS
Bedside and Verbal shift change report given to PALMA Uriostegui RN (oncoming nurse) by Clinton Darling RN (offgoing nurse). Report included the following information SBAR, Kardex, Intake/Output and MAR.

## 2020-01-28 NOTE — PROGRESS NOTES
Hospitalist Progress Note    Patient: Kassandra Sahu MRN: [id number]  CSN: [id number]    YOB: 1950  Age: 77 y.o. Sex: male    DOA: 1/27/2020 LOS:  LOS: 1 day                Assessment/Plan     Patient Active Problem List   Diagnosis Code    Enlarged prostate with lower urinary tract symptoms (LUTS) N40.1    Calculus of both kidneys N20.0    Rheumatoid arthritis ([address] M06.9    Essential hypertension I10    GERD (gastroesophageal reflux disease) K21.9    Disorder of bone and cartilage, unspecified M89.9, M94.9    Depression F32.9    Aortic stenosis I35.0    Gout M10.9    Impaired fasting blood sugar R73.01    Male erectile dysfunction, unspecified N52.9    Anxiety state F41.1    Chronic allergic rhinitis J30.9    History of aortic valve replacement Z95.2    Deviated nasal septum J34.2    Chronic maxillary sinusitis J32.0    Multiple-type hyperlipidemia E78.2    SHAILESH (obstructive sleep apnea) G47.33    Sensorineural hearing loss (SNHL) of both ears H90.3    Cellulitis L03.90    Cellulitis of foot, left L03. 116    Rheumatoid aortitis I01.1    Osteomyelitis of ankle or foot, acute, left (HCC) M86.172    Cellulitis and abscess of lower extremity L03.119, L02.419            Patient admitted by podiatry service,     Left foot cellulitis/osteomyelitis-  Started on vancomycin and Zosyn, follow wound cultures. ID consulted. Wound care consulted.     Hypertension-  Continue with home medications monitor blood pressure.     History of rheumatoid arthritis-  He is on Enbrel at home which was stopped because of wound healing. We will continue to hold Enbrel at this point.     History of aortic valve replacement-  Bovine not on any anticoagulation.     GERD-  Continue with PPI     Gout-  Continue with allopurinol.     BPH-  Continue with Flomax     DVT prophylaxis with Lovenox    Disposition : per primary    Review of systems  General: No fevers or chills.   Cardiovascular: No chest pain or pressure. No palpitations. Pulmonary: No shortness of breath. Gastrointestinal: No nausea, vomiting. Physical Exam:  General: Awake, cooperative, no acute distress    HEENT: NC, Atraumatic. PERRLA, anicteric sclerae. Lungs: CTA Bilaterally. No Wheezing/Rhonchi/Rales. Heart:  S1 S2,  No murmur, No Rubs, No Gallops  Abdomen: Soft, Non distended, Non tender.  +Bowel sounds,   Extremities: Left foot with dressing  Psych:   Not anxious or agitated. Neurologic:  No acute neurological deficit. Vital signs/Intake and Output:  Visit Vitals  /56 (BP 1 Location: Left arm, BP Patient Position: At rest)   Pulse 81   Temp 99.2 °F (37.3 °C)   Resp 16   Ht 5' 9\" (1.753 m)   Wt 80.3 kg (177 lb)   SpO2 91%   BMI 26.14 kg/m²     Current Shift:  01/28 0701 - 01/28 1900  In: 6841 [P.O.:240; I.V.:1231]  Out: 200 [Urine:200]  Last three shifts:  01/26 1901 - 01/28 0700  In: -   Out: 725 [Urine:725]            Labs: Results:       Chemistry Recent Labs     01/28/20  0450 01/27/20  1718   * 88    138   K 4.7 4.4    104   CO2 29 27   BUN 23* 25*   CREA 0.99 1.02   CA 8.8 9.8   AGAP 3 7   BUCR 23* 25*      CBC w/Diff Recent Labs     01/28/20  0450 01/27/20  1718   WBC 13.5* 10.3   RBC 3.51* 3.98*   HGB 10.3* 11.6*   HCT 31.9* 36.6   * 664*   GRANS  --  68   LYMPH  --  17*   EOS  --  7*      Cardiac Enzymes No results for input(s): CPK, CKND1, JESSI in the last 72 hours. No lab exists for component: CKRMB, TROIP   Coagulation No results for input(s): PTP, INR, APTT, INREXT in the last 72 hours. Lipid Panel No results found for: CHOL, CHOLPOCT, CHOLX, CHLST, CHOLV, 135709, HDL, HDLP, LDL, LDLC, DLDLP, 388505, VLDLC, VLDL, TGLX, TRIGL, TRIGP, TGLPOCT, CHHD, CHHDX   BNP No results for input(s): BNPP in the last 72 hours. Liver Enzymes No results for input(s): TP, ALB, TBIL, AP, SGOT, GPT in the last 72 hours.     No lab exists for component: DBIL   Thyroid Studies No results found for: T4, T3U, TSH, TSHEXT     Procedures/imaging: see electronic medical records for all procedures/Xrays and details which were not copied into this note but were reviewed prior to creation of Plan

## 2020-01-28 NOTE — PROGRESS NOTES
5935 -  Bedside and verbal shift change report given to Lisseth Mora, RN (on coming nurse) by Eliane Caro RN (off going nurse). Report included the following information SBAR, Kardex, OR Summary, Intake/Output and MAR.    0845 -  Will be off unit to IR for PICC line. 1604 -  Wound culture sent to lab.    1754 -  Wound care nurse came and does wound vac.    1822 -  Pt complains level 8 around his wound. Admin Dilaudid 1 mg. Will continue monitor for pain, numbness & tingleness on wound vac site    1938 -  Bedside and verbal shift change report given by SHENG Garrett (off going nurse) to PALMA Martin RN(on coming nurse). Report included the following information SBAR, Kardex, OR Summary, Intake/Output and MAR.

## 2020-01-28 NOTE — PROGRESS NOTES
conducted an initial consultation and Spiritual Assessment for ANIBAL Vigil, who is a 77 y. o.,male. According to the patients chart Alevism Affiliation is: Restorationist.     The reason the Patient came to the hospital is:   Patient Active Problem List    Diagnosis Date Noted    Enlarged prostate with lower urinary tract symptoms (LUTS) 12/04/2016     Priority: 1 - One    Calculus of both kidneys 12/04/2016     Priority: 1 - One    Male erectile dysfunction, unspecified 12/18/2017     Priority: 3 - Three    Rheumatoid aortitis 01/27/2020    Osteomyelitis of ankle or foot, acute, left ([facility] [address] 01/27/2020    Cellulitis and abscess of lower extremity 01/27/2020    Cellulitis 07/08/2019    Cellulitis of foot, left 07/08/2019    Sensorineural hearing loss (SNHL) of both ears 10/31/2018    Chronic maxillary sinusitis 10/24/2018    Deviated nasal septum 09/27/2018    Chronic allergic rhinitis 08/16/2018    SHAILESH (obstructive sleep apnea) 08/16/2018    Rheumatoid arthritis ([facility] [address] 12/05/2016    Essential hypertension 12/05/2016    GERD (gastroesophageal reflux disease) 12/05/2016    Disorder of bone and cartilage, unspecified 12/05/2016    Depression 12/05/2016    Aortic stenosis 12/05/2016    Gout 12/05/2016    Impaired fasting blood sugar 12/05/2016    History of aortic valve replacement 11/23/2016    Multiple-type hyperlipidemia 06/02/2016    Anxiety state 06/03/2009        The  provided the following Interventions:  Initiated a relationship of care and support. Explored issues of jackson, belief, spirituality and Tenriism/ritual needs while hospitalized. Listened empathically. Provided information about Spiritual Care Services. Offered prayer and assurance of continued prayers on patients behalf. Chart reviewed. The following outcomes were achieved:  Family shared limited information about their medical narrative.   Family processed feelings about current hospitalization. Family expressed gratitude for Spiritual Care visit. Assessment:  There are no significant spiritual or Evangelical issues which require further intervention at this time. Patient does not have any Evangelical or cultural needs that will affect patients preferences in health care. Plan:  Chaplains will continue to follow and will provide pastoral care as needed or requested.  recommends bedside caregivers page  on duty if patient shows signs of acute spiritual or emotional distress. [address] [facility] Christine Rosario M.Div.   [address]  [phone] - Office

## 2020-01-28 NOTE — WOUND CARE
Received consult for woundvac application to left foot. Wound vac applied with no complications. Notified nurse Juanito of application.

## 2020-01-28 NOTE — PROGRESS NOTES
Reason for Admission:   Direct admit (increased serosanguineous drainage from the wound)                   RUR Score:    13%                 Plan for utilizing home health:  TBD                        Current Advanced Directive/Advance Care Plan: on file                         Transition of Care Plan:     [facility] and physician follow up vs SNF                 Chart reviewed. Per physician documentation \"Howie Vivas is a 77 y.o. male with past medical history significant for RA, hypertension, aortic valve replacement, hyperlipidemia, gout, BPH is being directly admitted from by Dr. Haleigh Brown from his clinic. Patient had left foot surgery for his rheumatoid arthritis. He had metal plates placed. He was visiting Dr. Haleigh Brown for follow-up today and it was noticed that he has open wound along the incision site and he has metal plate exposed. He also notes that he has some increased serosanguineous drainage from the wound. He denies any fever, chills, nausea, vomiting, chest pain, shortness of breath. He does report that his his Enbrel was stopped before surgery and he had increased to joint pains especially in his fingers and he required to take a few doses of Enbrel after surgery. \"    [phone]:  CM and provider met with pt and his friend at bedside to discuss transition of care. Noted pt currently with a PICC line for IVABX. CM discussed options to include the need for outpt infusion. Clinical information was sent to [facility] to see if pt would have a finical/copay. Pt will have a copay for home infusion. CM discussed [facility] as well. CM to await out come of treatment plan to move forward with transition of care. Please encourage ambulation as appropriate or order therapy services to assist with identifying an appropriate transition of care.  CM to continue to follow and assist.    Please note pt has indicated if he needs [facility] he would like to use [facility] as he has used this agency in the past.    Care Management Interventions  PCP Verified by CM:  Yes  Transition of Care Consult (CM Consult): Discharge Planning  Health Maintenance Reviewed: Yes  Current Support Network: Lives with Spouse  Confirm Follow Up Transport: Family

## 2020-01-28 NOTE — ROUTINE PROCESS
Bedside and Verbal shift change report given to ALLY Estrella RN (oncoming nurse) by [employer] RN (offgoing nurse). Report included the following information SBAR, Kardex, Intake/Output and MAR.

## 2020-01-29 ENCOUNTER — APPOINTMENT (OUTPATIENT)
Dept: VASCULAR SURGERY | Age: 70
DRG: 504 | End: 2020-01-29
Attending: PODIATRIST
Payer: MEDICARE

## 2020-01-29 PROBLEM — T84.7XXA: Status: ACTIVE | Noted: 2020-01-29

## 2020-01-29 LAB
ANION GAP SERPL CALC-SCNC: 5 MMOL/L (ref 3–18)
BUN SERPL-MCNC: 17 MG/DL (ref 7–18)
BUN/CREAT SERPL: 20 (ref 12–20)
CALCIUM SERPL-MCNC: 8.5 MG/DL (ref 8.5–10.1)
CHLORIDE SERPL-SCNC: 104 MMOL/L (ref 100–111)
CO2 SERPL-SCNC: 26 MMOL/L (ref 21–32)
CREAT SERPL-MCNC: 0.83 MG/DL (ref 0.6–1.3)
ERYTHROCYTE [DISTWIDTH] IN BLOOD BY AUTOMATED COUNT: 16.1 % (ref 11.6–14.5)
GLUCOSE SERPL-MCNC: 98 MG/DL (ref 74–99)
HCT VFR BLD AUTO: 31.6 % (ref 36–48)
HGB BLD-MCNC: 10.2 G/DL (ref 13–16)
MCH RBC QN AUTO: 29.3 PG (ref 24–34)
MCHC RBC AUTO-ENTMCNC: 32.3 G/DL (ref 31–37)
MCV RBC AUTO: 90.8 FL (ref 74–97)
PLATELET # BLD AUTO: 625 K/UL (ref 135–420)
PMV BLD AUTO: 9 FL (ref 9.2–11.8)
POTASSIUM SERPL-SCNC: 4.2 MMOL/L (ref 3.5–5.5)
RBC # BLD AUTO: 3.48 M/UL (ref 4.7–5.5)
SODIUM SERPL-SCNC: 135 MMOL/L (ref 136–145)
WBC # BLD AUTO: 11.6 K/UL (ref 4.6–13.2)

## 2020-01-29 PROCEDURE — 74011000250 HC RX REV CODE- 250: Performed by: PODIATRIST

## 2020-01-29 PROCEDURE — 93925 LOWER EXTREMITY STUDY: CPT

## 2020-01-29 PROCEDURE — 74011250637 HC RX REV CODE- 250/637: Performed by: PODIATRIST

## 2020-01-29 PROCEDURE — 74011250636 HC RX REV CODE- 250/636: Performed by: HOSPITALIST

## 2020-01-29 PROCEDURE — 65270000029 HC RM PRIVATE

## 2020-01-29 PROCEDURE — 85027 COMPLETE CBC AUTOMATED: CPT

## 2020-01-29 PROCEDURE — 74011250636 HC RX REV CODE- 250/636: Performed by: PODIATRIST

## 2020-01-29 PROCEDURE — 74011250637 HC RX REV CODE- 250/637: Performed by: HOSPITALIST

## 2020-01-29 PROCEDURE — 80048 BASIC METABOLIC PNL TOTAL CA: CPT

## 2020-01-29 PROCEDURE — 74011000258 HC RX REV CODE- 258: Performed by: PODIATRIST

## 2020-01-29 RX ORDER — HYDROMORPHONE HYDROCHLORIDE 1 MG/ML
1 INJECTION, SOLUTION INTRAMUSCULAR; INTRAVENOUS; SUBCUTANEOUS
Status: DISCONTINUED | OUTPATIENT
Start: 2020-01-29 | End: 2020-02-04 | Stop reason: HOSPADM

## 2020-01-29 RX ADMIN — PANTOPRAZOLE SODIUM 40 MG: 40 TABLET, DELAYED RELEASE ORAL at 06:52

## 2020-01-29 RX ADMIN — Medication 10 ML: at 13:45

## 2020-01-29 RX ADMIN — HYDROMORPHONE HYDROCHLORIDE 1 MG: 1 INJECTION, SOLUTION INTRAMUSCULAR; INTRAVENOUS; SUBCUTANEOUS at 08:36

## 2020-01-29 RX ADMIN — METOPROLOL TARTRATE 25 MG: 25 TABLET ORAL at 08:36

## 2020-01-29 RX ADMIN — HYDROMORPHONE HYDROCHLORIDE 1 MG: 1 INJECTION, SOLUTION INTRAMUSCULAR; INTRAVENOUS; SUBCUTANEOUS at 13:55

## 2020-01-29 RX ADMIN — HYDROMORPHONE HYDROCHLORIDE 1 MG: 1 INJECTION, SOLUTION INTRAMUSCULAR; INTRAVENOUS; SUBCUTANEOUS at 00:04

## 2020-01-29 RX ADMIN — COLLAGENASE SANTYL: 250 OINTMENT TOPICAL at 13:44

## 2020-01-29 RX ADMIN — OXYCODONE HYDROCHLORIDE AND ACETAMINOPHEN 1 TABLET: 7.5; 325 TABLET ORAL at 21:22

## 2020-01-29 RX ADMIN — CALCIUM 500 MG: 500 TABLET ORAL at 18:32

## 2020-01-29 RX ADMIN — ENOXAPARIN SODIUM 40 MG: 40 INJECTION, SOLUTION INTRAVENOUS; SUBCUTANEOUS at 18:32

## 2020-01-29 RX ADMIN — Medication 1 CAPSULE: at 08:35

## 2020-01-29 RX ADMIN — VANCOMYCIN HYDROCHLORIDE 1250 MG: 1.25 INJECTION, POWDER, LYOPHILIZED, FOR SOLUTION INTRAVENOUS at 14:34

## 2020-01-29 RX ADMIN — LISINOPRIL 5 MG: 5 TABLET ORAL at 08:35

## 2020-01-29 RX ADMIN — ALLOPURINOL 100 MG: 100 TABLET ORAL at 08:35

## 2020-01-29 RX ADMIN — TAMSULOSIN HYDROCHLORIDE 0.4 MG: 0.4 CAPSULE ORAL at 08:34

## 2020-01-29 RX ADMIN — Medication 10 ML: at 06:55

## 2020-01-29 RX ADMIN — HYDROMORPHONE HYDROCHLORIDE 1 MG: 1 INJECTION, SOLUTION INTRAMUSCULAR; INTRAVENOUS; SUBCUTANEOUS at 18:32

## 2020-01-29 RX ADMIN — Medication 10 ML: at 22:00

## 2020-01-29 RX ADMIN — MONTELUKAST 10 MG: 10 TABLET, FILM COATED ORAL at 08:36

## 2020-01-29 RX ADMIN — PIPERACILLIN AND TAZOBACTAM 3.38 G: 3; .375 INJECTION, POWDER, LYOPHILIZED, FOR SOLUTION INTRAVENOUS at 04:01

## 2020-01-29 RX ADMIN — SERTRALINE HYDROCHLORIDE 100 MG: 50 TABLET ORAL at 08:35

## 2020-01-29 RX ADMIN — OXYCODONE HYDROCHLORIDE AND ACETAMINOPHEN 1 TABLET: 7.5; 325 TABLET ORAL at 03:59

## 2020-01-29 RX ADMIN — Medication 81 MG: at 08:35

## 2020-01-29 RX ADMIN — LORATADINE 10 MG: 10 TABLET ORAL at 08:36

## 2020-01-29 RX ADMIN — CHOLECALCIFEROL TAB 10 MCG (400 UNIT) 1 TABLET: 10 TAB at 09:54

## 2020-01-29 RX ADMIN — PIPERACILLIN AND TAZOBACTAM 3.38 G: 3; .375 INJECTION, POWDER, LYOPHILIZED, FOR SOLUTION INTRAVENOUS at 21:22

## 2020-01-29 RX ADMIN — PIPERACILLIN AND TAZOBACTAM 3.38 G: 3; .375 INJECTION, POWDER, LYOPHILIZED, FOR SOLUTION INTRAVENOUS at 13:44

## 2020-01-29 RX ADMIN — VANCOMYCIN HYDROCHLORIDE 1250 MG: 1.25 INJECTION, POWDER, LYOPHILIZED, FOR SOLUTION INTRAVENOUS at 00:03

## 2020-01-29 RX ADMIN — ATORVASTATIN CALCIUM 80 MG: 20 TABLET, FILM COATED ORAL at 21:22

## 2020-01-29 RX ADMIN — CALCIUM 500 MG: 500 TABLET ORAL at 08:35

## 2020-01-29 RX ADMIN — AMLODIPINE BESYLATE 10 MG: 5 TABLET ORAL at 08:36

## 2020-01-29 NOTE — CONSULTS
VASCULAR CONSULTATION NOTE  Consult performed by Ken Dunn PA-C, under the supervision of Dr. Sandrita Duggan    A vascular consultation has been requested on Kassandra Sahu, who is a 77 y.o. male admitted to the hospital on 1/27/2020 with an admitting diagnosis of Cellulitis and abscess of lower extremity [L03.119, L02.419]  Osteomyelitis of ankle or foot, acute, left ([address] [M86.172]  Rheumatoid aortitis [I01.1]. He is being seen for evaluation and possible treatment of atherosclerosis of native arteries. Attending Physician: Dr. Sandrita Duggan             Referring Physician: Dr. Andrés Holloway  HPI: 77y.o. year old male new inpatient consultation complains of left great toe wound dehiscence following recent podiatric surgery. For this reason Dr. Andrés Holloway had consulted Dr. Jo Aceves evaluation and or treatment. Pt and significant other, who is at bedside, state that they noticed an odor from the left great toe 3-4 days ago. And 2 days ago noticed the left great toe incision open with exposed hardware. He states he had a TIA in the past and had a carotid duplex at [facility].     The patient denies lower extremity claudication and or rest pain. He does not smoke. He drinks alcohol occasionally. He is retired from [employer]. He has 2 grown children. He is a . ROS: Denies chest pain. Sob. Denies abdominal pain, weight loss, n/v/d. Denies rest pain/claudication. EXAM:   Visit Vitals  /57 (BP 1 Location: Left arm, BP Patient Position: At rest;Sitting)   Pulse 68   Temp 98 °F (36.7 °C)   Resp 16   Ht 5' 9\" (1.753 m)   Wt 80.3 kg (177 lb)   SpO2 97%   BMI 26.14 kg/m²     AAO, Conjunctiva pink, oral mucosa pink & moist, no JVD, no thyromegaly, Resiratory effort normal, cardiac:  Rrr. No murmur, rubs, or gallops. pmi wnl. Abdomen: positive bs, soft, non-tender, non-distended. No hsm or pulsatile masses.   There are no carotid bruits. There are 2+ radial, femoral, and popliteal pulses bilaterally. The do not appreciate left pt/dp pulses. There is 1+right dp pulse no pt pulse. The left great toe wound is open with exposed hardware. Wound looks clean. DATA  REVIEWED:   Lower extremity duplex pending. DX: (self limited or minor problem)  1. Left great toe wound dehiscence with exposed hardware. 2.  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 PLAN:   Arterial duplex have been ordered. Will make further recommendations based on duplex results. Continue current treatment.        Gregorio Gonsalves PA-C  [phone]

## 2020-01-29 NOTE — WOUND CARE
Plastic surgery removed wound vac. Patient refusing to have wound vac replaced. Spoke with Dr. Anne Tran and received verbal orders for daily Santyl. Placed order and spoke with primary nurse to apply today when medication is available.

## 2020-01-29 NOTE — PROGRESS NOTES
Bedside and Verbal shift change report given to Devon Garcia RN (oncoming nurse) by Nicolas Sylvester RN (offgoing nurse). Report included the following information SBAR, Kardex, Intake/Output and MAR. Patient A/OX4. Patient in bed resting comfortably. No complaints of nausea at this time. Patient complaining of pain. 0835-Patient requested something for pain. Administered Dilaudid 1MG IV. Pain 8/10    0859-Dr. Campo called to have pain medications changed fro Q6 to Q4. Order changed. 0950-Plastics physician in to assess patient. Wound clinic in to assess patient and remove wound vac per patients request. Wound dressing ordered. 1230-Patient taken down to vascular. 1330-Patient back on unit. 1355-Patient requesting something for pain. Administered Dilaudid 1MG IV. Pain 7/10    1832-Patient requesting something for pain. Administered Dilaudid 1MG IV. Pain 4/10    Bedside and Verbal shift change report given to Jimenez Ansari RN (oncoming nurse) by Devon Garcia RN (offgoing nurse). Report included the following information SBAR, Kardex, Intake/Output and MAR.

## 2020-01-29 NOTE — PROGRESS NOTES
1930 - Bedside report received from [place] tita, RN. Patient in bed. Pain 5/10.     2100 - Patient in bed at this time. IV to R cephalic  intact and patent. Dressing to L foot  CDI. Wnd vac in place, no output. + CMS. Pt A & O x 4. LS clear, on RA. Abdomen soft, NT and ND. + BS to all 4 quadrants. Denies nausea. Pain 0/10. Call light within reach. 0500-Ordered tests drawn and sent to lab. Pt had uneventful shift. Pt could use more Pain medication or increased frequency. Day RN to f/u. No issues/concerns at this time.  Call bell within reach

## 2020-01-29 NOTE — CONSULTS
[facility]                                            (A Division of [address] [place] [address])                                   Consultation Note      Date of Admission: 1/27/2020    Date of Consultation: 1/29/2020    Referred by: Benjie Fonseca DPM    Reason for Referral: L foot infection    Current Antimicrobials: Prior Antimicrobials   1. Vanco IV (1/27-) #2  2. Pip/tzb IV (1/27-) #2        Assessment: Plan:   L Great Toe hardware infection  1/27:  CRP - 41mg/L    Exposed hardware = infected hardware = infected underlying/connected bone. 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 Hardware needs to be removed to effect cure; lacking its removal, the best we can do is stunt growth and suppress. ->Vanco/Pip-tzb #2    Good abx selection given likelihood of MRSA along with GNRs and probable anaerobes (given smell/malodor). Check vascular supply; if the roads are closed, my [employer]-and-halves won't deliver the goods. Plan on PICC and going 6wks. I'll be back Friday, but chart-stalk tomorrow. PAD    RA    HTN    Gout      Microbiology:  1/27 - Wound (+) GNRs      Lines / Catheters: peripheral      HPI:  CC:  I\"ve been fighting this toe for months. 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    Osteomyelitis of ankle or foot, acute, left ([address] 01/27/2020    Cellulitis and abscess of lower extremity 01/27/2020    Aortic stenosis 12/05/2016     Past Medical History:   Diagnosis Date    Anxiety     Anxiety     Aortic stenosis     Aortic stenosis     Arthritis     BPH (benign prostatic hypertrophy)     Depression     Depression     GERD (gastroesophageal reflux disease)     Gout     HTN (hypertension)     Renal calculi      Past Surgical History:   Procedure Laterality Date    HX AORTIC VALVE REPLACEMENT      HX COLONOSCOPY      HX HERNIA REPAIR      HX KNEE REPLACEMENT      HX LITHOTRIPSY  01/17/2017    SC[facility]: ESWL- 7 mm x 10 mm Right UPJ / Renal Pelvis stone, 2500 shocks max intensity level 5 Dr. Laurent Hill     Family History   Problem Relation Age of Onset    Heart Disease Father      Social History     Socioeconomic History    Marital status: SINGLE     Spouse name: Not on file    Number of children: Not on file    Years of education: Not on file    Highest education level: Not on file   Occupational History    Not on file   Social Needs    Financial resource strain: Not on file    Food insecurity:     Worry: Not on file     Inability: Not on file    Transportation needs:     Medical: Not on file     Non-medical: Not on file   Tobacco Use    Smoking status: Never Smoker    Smokeless tobacco: Never Used   Substance and Sexual Activity    Alcohol use:  Yes    Drug use: Not on file    Sexual activity: Not on file   Lifestyle    Physical activity:     Days per week: Not on file     Minutes per session: Not on file    Stress: Not on file   Relationships    Social connections:     Talks on phone: Not on file     Gets together: Not on file     Attends Jainism service: Not on file     Active member of club or organization: Not on file     Attends meetings of clubs or organizations: Not on file     Relationship status: Not on file    Intimate partner violence:     Fear of current or ex partner: Not on file     Emotionally abused: Not on file     Physically abused: Not on file     Forced sexual activity: Not on file   Other Topics Concern    Not on file   Social History Narrative    Not on file       Allergies:  Patient has no known allergies.  .     Medications:  Current Facility-Administered Medications   Medication Dose Route Frequency    HYDROmorphone (PF) (DILAUDID) injection 1 mg  1 mg IntraVENous Q4H PRN    collagenase (SANTYL) 250 unit/gram ointment   Topical DAILY    Vancomycin Trough due 1/29/20 at 23:30 (30 minutes prior to 00:00 dose)  1 Each Other ONCE    allopurinoL (ZYLOPRIM) tablet 100 mg  100 mg Oral DAILY    amLODIPine (NORVASC) tablet 10 mg  10 mg Oral DAILY    aspirin delayed-release tablet 81 mg  81 mg Oral DAILY    atorvastatin (LIPITOR) tablet 80 mg  80 mg Oral QHS    calcium carbonate (OS-CORBY) tablet 500 mg [elemental]  500 mg Oral BID WITH MEALS    cholecalciferol (VITAMIN D3) (400 Units /10 mcg) tablet 1 Tab  400 Units Oral DAILY    loratadine (CLARITIN) tablet 10 mg  10 mg Oral DAILY    lisinopril (PRINIVIL, ZESTRIL) tablet 5 mg  5 mg Oral DAILY    metoprolol tartrate (LOPRESSOR) tablet 25 mg  25 mg Oral DAILY    montelukast (SINGULAIR) tablet 10 mg  10 mg Oral DAILY    fish oil-omega-3 fatty acids 340-1,000 mg capsule 1 Cap  1 Cap Oral DAILY    pantoprazole (PROTONIX) tablet 40 mg  40 mg Oral ACB    sertraline (ZOLOFT) tablet 100 mg  100 mg Oral DAILY    tamsulosin (FLOMAX) capsule 0.4 mg  0.4 mg Oral DAILY    lidocaine (XYLOCAINE) 10 mg/mL (1 %) injection 1-20 mL  1-20 mL SubCUTAneous RAD CONTINUOUS    heparin (porcine) 100 unit/mL injection 500 Units  500 Units InterCATHeter Q8H PRN    sodium chloride (NS) flush 5-40 mL  5-40 mL IntraVENous Q8H    sodium chloride (NS) flush 5-40 mL  5-40 mL IntraVENous PRN    piperacillin-tazobactam (ZOSYN) 3.375 g in 0.9% sodium chloride (MBP/ADV) 100 mL MBP  3.375 g IntraVENous Q6H    oxyCODONE-acetaminophen (PERCOCET 7.5) 7.5-325 mg per tablet 1 Tab  1 Tab Oral Q4H PRN    enoxaparin (LOVENOX) injection 40 mg  40 mg SubCUTAneous Q24H    Vancomycin-pharmacy to consult  1 Each Other Rx Dosing/Monitoring    vancomycin (VANCOCIN) 1,250 mg in 0.9% sodium chloride 250 mL (VIAL-MATE)  1,250 mg IntraVENous Q12H        ROS:  Constitutional: negative for fevers, chills and sweats  Respiratory: negative for cough or sputum  Cardiovascular: negative for chest pain, dyspnea  Gastrointestinal: negative for nausea, vomiting, diarrhea and abdominal pain  Genitourinary:negative for dysuria     Physical Exam:  Temp (24hrs), Av.9 °F (37.2 °C), Min:98 °F (36.7 °C), Max:99.6 °F (37.6 °C)    Visit Vitals  /57 (BP 1 Location: Left arm, BP Patient Position: At rest;Sitting)   Pulse 68   Temp 98 °F (36.7 °C)   Resp 16   Ht 5' 9\" (1.753 m)   Wt 80.3 kg (177 lb)   SpO2 97%   BMI 26.14 kg/m²       General: Well developed, well nourished 77 y.o.  male in no acute distress. ENT: ENT exam normal, no neck nodes or sinus tenderness  Head: normocephalic, without obvious abnormality  Mouth:  mucous membranes moist, pharynx normal without lesions  Neck: supple, symmetrical, trachea midline   Cardio:  regular rate and rhythm, S1, S2 normal, no murmur, click, rub or gallop  Chest: inspection normal - no chest wall deformities or tenderness, respiratory effort normal  Lungs: clear to auscultation, no wheezes or rales and unlabored breathing  Abdomen: soft, non-tender. Bowel sounds normal. No masses, no organomegaly.   Extremities:  no redness or tenderness in the calves or thighs, no edema; vascular changes bilat feet (distal shins and beyond); I cannot appreciate pulses (DP/PT) left side; obvious open wound L Great toe with exposed hardware  Neuro: Grossly normal     Lab results:    Chemistry  Recent Labs     20  1718   GLU 98 100* 88   * 140 138   K 4.2 4.7 4.4    108 104   CO2 26 29 27   BUN 17 23* 25*   CREA 0.83 0.99 1.02   CA 8.5 8.8 9.8   AGAP 5 3 7   BUCR 20 23* 25*       CBC w/ Diff  Recent Labs     20  0450 20  1718   WBC 11.6 13.5* 10.3   RBC 3.48* 3.51* 3.98*   HGB 10.2* 10.3* 11.6*   HCT 31.6* 31.9* 36.6   * 588* 664*   GRANS  --   --  68   LYMPH  --   --  17*   EOS  --   --  7*       Microbiology  All Micro Results     Procedure Component Value Units Date/Time    CULTURE, Jani Pollardter STAIN [[id number]]  (Abnormal) Collected:  01/27/20 9413    Order Status:  Completed Specimen:  Wound Drainage Updated:  01/29/20 1248     Special Requests: NO SPECIAL REQUESTS        GRAM STAIN FEW WBC'S         RARE Threasa Valentina POSITIVE RODS         RARE Threasa Valentina NEGATIVE RODS        Culture result:       MODERATE GRAM NEGATIVE RODS            FEW DIPHTHEROIDS              Rochelle Arellano MD  Cell [phone]  [facility]   1/29/2020   2:48 PM

## 2020-01-29 NOTE — PROGRESS NOTES
Problem: Pain  Goal: *Control of Pain  Outcome: Progressing Towards Goal     Problem: Falls - Risk of  Goal: *Absence of Falls  Description  Document Zanat President Fall Risk and appropriate interventions in the flowsheet.   Outcome: Progressing Towards Goal  Note: Fall Risk Interventions:            Medication Interventions: Teach patient to arise slowly    Elimination Interventions: Call light in reach, Patient to call for help with toileting needs

## 2020-01-29 NOTE — ROUTINE PROCESS
Bedside and Verbal shift change report given to SHENG Traylor by Odilon Barton. Report included the following information SBAR, Kardex, OR Summary, Intake/Output and MAR.

## 2020-01-29 NOTE — PROGRESS NOTES
Hospitalist Progress Note    Patient: Tone Laurent MRN: [id number]  CSN: [id number]    YOB: 1950  Age: 77 y.o. Sex: male    DOA: 1/27/2020 LOS:  LOS: 2 days                Assessment/Plan     Patient Active Problem List   Diagnosis Code    Enlarged prostate with lower urinary tract symptoms (LUTS) N40.1    Calculus of both kidneys N20.0    Rheumatoid arthritis ([facility] M06.9    Essential hypertension I10    GERD (gastroesophageal reflux disease) K21.9    Disorder of bone and cartilage, unspecified M89.9, M94.9    Depression F32.9    Aortic stenosis I35.0    Gout M10.9    Impaired fasting blood sugar R73.01    Male erectile dysfunction, unspecified N52.9    Anxiety state F41.1    Chronic allergic rhinitis J30.9    History of aortic valve replacement Z95.2    Deviated nasal septum J34.2    Chronic maxillary sinusitis J32.0    Multiple-type hyperlipidemia E78.2    SHAILESH (obstructive sleep apnea) G47.33    Sensorineural hearing loss (SNHL) of both ears H90.3    Cellulitis L03.90    Cellulitis of foot, left L03. 116    Rheumatoid aortitis I01.1    Osteomyelitis of ankle or foot, acute, left (HCC) M86.172    Cellulitis and abscess of lower extremity L03.119, L02.419    Infection and inflammatory reaction due to other internal orthopedic prosthetic devices, implants and grafts, initial encounter ([facility] T84. 7XXA            Patient admitted by podiatry service,     Left foot cellulitis/wound dehiscence/exposed hardware-  Started on vancomycin and Zosyn, follow wound cultures. Seen by plastic surgery, ID, vascular  For PVL     Hypertension-  Continue with home medications monitor blood pressure.     History of rheumatoid arthritis-  He is on Enbrel at home which was stopped because of wound healing.   We will continue to hold Enbrel at this point.     History of aortic valve replacement-  Bovine not on any anticoagulation.     GERD-  Continue with PPI     Gout-  Continue with allopurinol.     BPH-  Continue with Flomax     DVT prophylaxis with Lovenox    Disposition : per primary    Review of systems  General: No fevers or chills. Cardiovascular: No chest pain or pressure. No palpitations. Pulmonary: No shortness of breath. Gastrointestinal: No nausea, vomiting. Physical Exam:  General: Awake, cooperative, no acute distress    HEENT: NC, Atraumatic. PERRLA, anicteric sclerae. Lungs: CTA Bilaterally. No Wheezing/Rhonchi/Rales. Heart:  S1 S2,  No murmur, No Rubs, No Gallops  Abdomen: Soft, Non distended, Non tender.  +Bowel sounds,   Extremities: Left foot great toe with open wound and exposed hardware. Psych:   Not anxious or agitated. Neurologic:  No acute neurological deficit. Vital signs/Intake and Output:  Visit Vitals  /54 (BP 1 Location: Left arm, BP Patient Position: At rest)   Pulse 73   Temp 98.9 °F (37.2 °C)   Resp 16   Ht 5' 9\" (1.753 m)   Wt 80.3 kg (177 lb)   SpO2 92%   BMI 26.14 kg/m²     Current Shift:  No intake/output data recorded. Last three shifts:  01/27 1901 - 01/29 0700  In: 1711 [P.O.:480; I.V.:1231]  Out: 1425 [Urine:1425]            Labs: Results:       Chemistry Recent Labs     01/29/20  0450 01/28/20  0450 01/27/20  1718   GLU 98 100* 88   * 140 138   K 4.2 4.7 4.4    108 104   CO2 26 29 27   BUN 17 23* 25*   CREA 0.83 0.99 1.02   CA 8.5 8.8 9.8   AGAP 5 3 7   BUCR 20 23* 25*      CBC w/Diff Recent Labs     01/29/20  0450 01/28/20  0450 01/27/20  1718   WBC 11.6 13.5* 10.3   RBC 3.48* 3.51* 3.98*   HGB 10.2* 10.3* 11.6*   HCT 31.6* 31.9* 36.6   * 588* 664*   GRANS  --   --  68   LYMPH  --   --  17*   EOS  --   --  7*      Cardiac Enzymes No results for input(s): CPK, CKND1, JESSI in the last 72 hours. No lab exists for component: CKRMB, TROIP   Coagulation No results for input(s): PTP, INR, APTT, INREXT, INREXT in the last 72 hours.     Lipid Panel No results found for: CHOL, CHOLPOCT, CHOLX, [address], [place]OLV, [address]26515, HDL, HDLP, LDL, LDLC, DLDLP, 181268, VLDLC, VLDL, TGLX, TRIGL, TRIGP, TGLPOCT, CHHD, CHHDX   BNP No results for input(s): BNPP in the last 72 hours. Liver Enzymes No results for input(s): TP, ALB, TBIL, AP, SGOT, GPT in the last 72 hours.     No lab exists for component: DBIL   Thyroid Studies No results found for: T4, T3U, TSH, TSHEXT, TSHEXT     Procedures/imaging: see electronic medical records for all procedures/Xrays and details which were not copied into this note but were reviewed prior to creation of Plan

## 2020-01-30 ENCOUNTER — APPOINTMENT (OUTPATIENT)
Dept: INTERVENTIONAL RADIOLOGY/VASCULAR | Age: 70
DRG: 504 | End: 2020-01-30
Attending: SURGERY
Payer: MEDICARE

## 2020-01-30 LAB
ANION GAP SERPL CALC-SCNC: 5 MMOL/L (ref 3–18)
BACTERIA SPEC CULT: ABNORMAL
BUN SERPL-MCNC: 16 MG/DL (ref 7–18)
BUN/CREAT SERPL: 17 (ref 12–20)
CALCIUM SERPL-MCNC: 8.5 MG/DL (ref 8.5–10.1)
CHLORIDE SERPL-SCNC: 108 MMOL/L (ref 100–111)
CO2 SERPL-SCNC: 27 MMOL/L (ref 21–32)
CREAT SERPL-MCNC: 0.93 MG/DL (ref 0.6–1.3)
DATE LAST DOSE: NORMAL
ERYTHROCYTE [DISTWIDTH] IN BLOOD BY AUTOMATED COUNT: 16.2 % (ref 11.6–14.5)
GLUCOSE SERPL-MCNC: 103 MG/DL (ref 74–99)
GRAM STN SPEC: ABNORMAL
HCT VFR BLD AUTO: 30.8 % (ref 36–48)
HGB BLD-MCNC: 9.9 G/DL (ref 13–16)
LEFT CFA DIST SYS PSV: 112.1 CM/S
LEFT DIST ATA VELOCITY: 61.8 CM/S
LEFT DIST PTA PSV: 90.9 CM/S
LEFT MID ATA VELOCITY: 56.1 CM/S
LEFT POP A PROX VEL RATIO: 0.92
LEFT POPLITEAL DIST SYS PSV: 57 CM/S
LEFT POPLITEAL PROX SYS PSV: 77.3 CM/S
LEFT PROX PFA A PSV: 56.4 CM/S
LEFT PTA MID SYS PSV: 53.7 CM/S
LEFT SFA DIST VEL RATIO: 0.77
LEFT SFA MID VEL RATIO: 0.86
LEFT SFA PROX VEL RATIO: 1.15
LEFT SUPER FEMORAL DIST SYS PSV: 84.2 CM/S
LEFT SUPER FEMORAL MID SYS PSV: 109.8 CM/S
LEFT SUPER FEMORAL PROX SYS PSV: 128.4 CM/S
MCH RBC QN AUTO: 29.4 PG (ref 24–34)
MCHC RBC AUTO-ENTMCNC: 32.1 G/DL (ref 31–37)
MCV RBC AUTO: 91.4 FL (ref 74–97)
PLATELET # BLD AUTO: 647 K/UL (ref 135–420)
PMV BLD AUTO: 9.2 FL (ref 9.2–11.8)
POTASSIUM SERPL-SCNC: 3.9 MMOL/L (ref 3.5–5.5)
RBC # BLD AUTO: 3.37 M/UL (ref 4.7–5.5)
REPORTED DOSE,DOSE: NORMAL UNITS
REPORTED DOSE/TIME,TMG: 1434
RIGHT CFA DIST SYS PSV: 121.6 CM/S
RIGHT DIST PTA PSV: 16.8 CM/S
RIGHT MID ATA VELOCITY: 68 CM/S
RIGHT POP A PROX VEL RATIO: 0.62
RIGHT POPLITEAL DIST SYS PSV: 71.5 CM/S
RIGHT POPLITEAL PROX SYS PSV: 68.3 CM/S
RIGHT PROX PFA A PSV: 47.3 CM/S
RIGHT PROX PTA PSV: 46.4 CM/S
RIGHT PTA MID SYS PSV: 174.9 CM/S
RIGHT SFA PROX VEL RATIO: 0.8
RIGHT SUPER FEMORAL DIST SYS PSV: 110.3 CM/S
RIGHT SUPER FEMORAL PROX SYS PSV: 95.7 CM/S
SERVICE CMNT-IMP: ABNORMAL
SODIUM SERPL-SCNC: 140 MMOL/L (ref 136–145)
VANCOMYCIN TROUGH SERPL-MCNC: 17.1 UG/ML (ref 10–20)
WBC # BLD AUTO: 7.7 K/UL (ref 4.6–13.2)

## 2020-01-30 PROCEDURE — 74011000258 HC RX REV CODE- 258: Performed by: PODIATRIST

## 2020-01-30 PROCEDURE — 80202 ASSAY OF VANCOMYCIN: CPT

## 2020-01-30 PROCEDURE — 85027 COMPLETE CBC AUTOMATED: CPT

## 2020-01-30 PROCEDURE — 04CS3ZZ EXTIRPATION OF MATTER FROM LEFT POSTERIOR TIBIAL ARTERY, PERCUTANEOUS APPROACH: ICD-10-PCS | Performed by: SURGERY

## 2020-01-30 PROCEDURE — 74011250637 HC RX REV CODE- 250/637: Performed by: HOSPITALIST

## 2020-01-30 PROCEDURE — 74011000250 HC RX REV CODE- 250: Performed by: SURGERY

## 2020-01-30 PROCEDURE — 74011250636 HC RX REV CODE- 250/636: Performed by: PODIATRIST

## 2020-01-30 PROCEDURE — 74011250636 HC RX REV CODE- 250/636

## 2020-01-30 PROCEDURE — 77010033678 HC OXYGEN DAILY

## 2020-01-30 PROCEDURE — 047S3ZZ DILATION OF LEFT POSTERIOR TIBIAL ARTERY, PERCUTANEOUS APPROACH: ICD-10-PCS | Performed by: SURGERY

## 2020-01-30 PROCEDURE — B41G1ZZ FLUOROSCOPY OF LEFT LOWER EXTREMITY ARTERIES USING LOW OSMOLAR CONTRAST: ICD-10-PCS | Performed by: SURGERY

## 2020-01-30 PROCEDURE — 80048 BASIC METABOLIC PNL TOTAL CA: CPT

## 2020-01-30 PROCEDURE — 74011250637 HC RX REV CODE- 250/637: Performed by: SURGERY

## 2020-01-30 PROCEDURE — B41F1ZZ FLUOROSCOPY OF RIGHT LOWER EXTREMITY ARTERIES USING LOW OSMOLAR CONTRAST: ICD-10-PCS | Performed by: SURGERY

## 2020-01-30 PROCEDURE — 74011250636 HC RX REV CODE- 250/636: Performed by: SURGERY

## 2020-01-30 PROCEDURE — 74011636320 HC RX REV CODE- 636/320: Performed by: PODIATRIST

## 2020-01-30 PROCEDURE — 74011000250 HC RX REV CODE- 250: Performed by: PODIATRIST

## 2020-01-30 PROCEDURE — 65270000029 HC RM PRIVATE

## 2020-01-30 PROCEDURE — B4101ZZ FLUOROSCOPY OF ABDOMINAL AORTA USING LOW OSMOLAR CONTRAST: ICD-10-PCS | Performed by: SURGERY

## 2020-01-30 PROCEDURE — 74011250636 HC RX REV CODE- 250/636: Performed by: HOSPITALIST

## 2020-01-30 PROCEDURE — 37229 IR ANGIO EXT LOWER LT: CPT

## 2020-01-30 PROCEDURE — 99153 MOD SED SAME PHYS/QHP EA: CPT

## 2020-01-30 PROCEDURE — 99152 MOD SED SAME PHYS/QHP 5/>YRS: CPT

## 2020-01-30 RX ORDER — CLOPIDOGREL BISULFATE 75 MG/1
75 TABLET ORAL DAILY
Status: DISCONTINUED | OUTPATIENT
Start: 2020-01-31 | End: 2020-02-04 | Stop reason: HOSPADM

## 2020-01-30 RX ORDER — FLUMAZENIL 0.1 MG/ML
0.2 INJECTION INTRAVENOUS
Status: DISCONTINUED | OUTPATIENT
Start: 2020-01-30 | End: 2020-01-30 | Stop reason: ALTCHOICE

## 2020-01-30 RX ORDER — FENTANYL CITRATE 50 UG/ML
25-200 INJECTION, SOLUTION INTRAMUSCULAR; INTRAVENOUS
Status: DISCONTINUED | OUTPATIENT
Start: 2020-01-30 | End: 2020-01-30 | Stop reason: ALTCHOICE

## 2020-01-30 RX ORDER — MIDAZOLAM HYDROCHLORIDE 1 MG/ML
1 INJECTION, SOLUTION INTRAMUSCULAR; INTRAVENOUS AS NEEDED
Status: DISCONTINUED | OUTPATIENT
Start: 2020-01-30 | End: 2020-01-30

## 2020-01-30 RX ORDER — SODIUM CHLORIDE 9 MG/ML
100 INJECTION, SOLUTION INTRAVENOUS CONTINUOUS
Status: DISPENSED | OUTPATIENT
Start: 2020-01-30 | End: 2020-01-31

## 2020-01-30 RX ORDER — MIDAZOLAM HYDROCHLORIDE 1 MG/ML
INJECTION, SOLUTION INTRAMUSCULAR; INTRAVENOUS
Status: COMPLETED
Start: 2020-01-30 | End: 2020-01-30

## 2020-01-30 RX ORDER — MIDAZOLAM HYDROCHLORIDE 1 MG/ML
.5-4 INJECTION, SOLUTION INTRAMUSCULAR; INTRAVENOUS
Status: DISCONTINUED | OUTPATIENT
Start: 2020-01-30 | End: 2020-01-30 | Stop reason: ALTCHOICE

## 2020-01-30 RX ORDER — WATER FOR INJECTION,STERILE
VIAL (ML) INJECTION
Status: DISCONTINUED
Start: 2020-01-30 | End: 2020-01-30 | Stop reason: WASHOUT

## 2020-01-30 RX ORDER — FLUMAZENIL 0.1 MG/ML
INJECTION INTRAVENOUS
Status: DISCONTINUED
Start: 2020-01-30 | End: 2020-01-30 | Stop reason: WASHOUT

## 2020-01-30 RX ORDER — FENTANYL CITRATE 50 UG/ML
INJECTION, SOLUTION INTRAMUSCULAR; INTRAVENOUS
Status: COMPLETED
Start: 2020-01-30 | End: 2020-01-30

## 2020-01-30 RX ORDER — SODIUM CHLORIDE 9 MG/ML
25 INJECTION, SOLUTION INTRAVENOUS CONTINUOUS
Status: DISCONTINUED | OUTPATIENT
Start: 2020-01-30 | End: 2020-01-30 | Stop reason: ALTCHOICE

## 2020-01-30 RX ORDER — DEXTROSE MONOHYDRATE AND SODIUM CHLORIDE 5; .45 G/100ML; G/100ML
100 INJECTION, SOLUTION INTRAVENOUS CONTINUOUS
Status: DISCONTINUED | OUTPATIENT
Start: 2020-01-30 | End: 2020-01-30

## 2020-01-30 RX ORDER — NALOXONE HYDROCHLORIDE 0.4 MG/ML
0.4 INJECTION, SOLUTION INTRAMUSCULAR; INTRAVENOUS; SUBCUTANEOUS AS NEEDED
Status: DISCONTINUED | OUTPATIENT
Start: 2020-01-30 | End: 2020-01-30 | Stop reason: ALTCHOICE

## 2020-01-30 RX ORDER — NALOXONE HYDROCHLORIDE 0.4 MG/ML
INJECTION, SOLUTION INTRAMUSCULAR; INTRAVENOUS; SUBCUTANEOUS
Status: DISCONTINUED
Start: 2020-01-30 | End: 2020-01-30 | Stop reason: WASHOUT

## 2020-01-30 RX ORDER — HEPARIN SODIUM 1000 [USP'U]/ML
10000 INJECTION, SOLUTION INTRAVENOUS; SUBCUTANEOUS
Status: DISCONTINUED | OUTPATIENT
Start: 2020-01-30 | End: 2020-01-30 | Stop reason: ALTCHOICE

## 2020-01-30 RX ORDER — HEPARIN SODIUM 5000 [USP'U]/ML
5000 INJECTION, SOLUTION INTRAVENOUS; SUBCUTANEOUS AS NEEDED
Status: DISCONTINUED | OUTPATIENT
Start: 2020-01-30 | End: 2020-01-30

## 2020-01-30 RX ORDER — HEPARIN SODIUM 1000 [USP'U]/ML
INJECTION, SOLUTION INTRAVENOUS; SUBCUTANEOUS
Status: COMPLETED
Start: 2020-01-30 | End: 2020-01-30

## 2020-01-30 RX ORDER — VERAPAMIL HYDROCHLORIDE 2.5 MG/ML
INJECTION, SOLUTION INTRAVENOUS
Status: DISCONTINUED
Start: 2020-01-30 | End: 2020-01-30

## 2020-01-30 RX ORDER — HEPARIN SODIUM 200 [USP'U]/100ML
INJECTION, SOLUTION INTRAVENOUS
Status: DISCONTINUED
Start: 2020-01-30 | End: 2020-01-30

## 2020-01-30 RX ORDER — CLOPIDOGREL 300 MG/1
600 TABLET, FILM COATED ORAL
Status: COMPLETED | OUTPATIENT
Start: 2020-01-30 | End: 2020-01-30

## 2020-01-30 RX ORDER — NITROGLYCERIN 10 MG/100ML
INJECTION INTRAVENOUS
Status: DISCONTINUED
Start: 2020-01-30 | End: 2020-01-30

## 2020-01-30 RX ORDER — HEPARIN SODIUM 1000 [USP'U]/ML
INJECTION, SOLUTION INTRAVENOUS; SUBCUTANEOUS
Status: DISCONTINUED
Start: 2020-01-30 | End: 2020-01-30

## 2020-01-30 RX ORDER — HEPARIN SODIUM 200 [USP'U]/100ML
INJECTION, SOLUTION INTRAVENOUS
Status: COMPLETED
Start: 2020-01-30 | End: 2020-01-30

## 2020-01-30 RX ORDER — HEPARIN SODIUM 200 [USP'U]/100ML
1000 INJECTION, SOLUTION INTRAVENOUS
Status: COMPLETED | OUTPATIENT
Start: 2020-01-30 | End: 2020-01-30

## 2020-01-30 RX ORDER — LIDOCAINE HYDROCHLORIDE 10 MG/ML
1-10 INJECTION, SOLUTION EPIDURAL; INFILTRATION; INTRACAUDAL; PERINEURAL
Status: COMPLETED | OUTPATIENT
Start: 2020-01-30 | End: 2020-01-30

## 2020-01-30 RX ORDER — FENTANYL CITRATE 50 UG/ML
50 INJECTION, SOLUTION INTRAMUSCULAR; INTRAVENOUS AS NEEDED
Status: DISCONTINUED | OUTPATIENT
Start: 2020-01-30 | End: 2020-01-30

## 2020-01-30 RX ADMIN — MIDAZOLAM HYDROCHLORIDE 0.5 MG: 1 INJECTION, SOLUTION INTRAMUSCULAR; INTRAVENOUS at 18:35

## 2020-01-30 RX ADMIN — MIDAZOLAM HYDROCHLORIDE 1 MG: 1 INJECTION, SOLUTION INTRAMUSCULAR; INTRAVENOUS at 17:54

## 2020-01-30 RX ADMIN — MIDAZOLAM HYDROCHLORIDE 1 MG: 1 INJECTION, SOLUTION INTRAMUSCULAR; INTRAVENOUS at 17:59

## 2020-01-30 RX ADMIN — FENTANYL CITRATE 50 MCG: 50 INJECTION, SOLUTION INTRAMUSCULAR; INTRAVENOUS at 17:54

## 2020-01-30 RX ADMIN — HYDROMORPHONE HYDROCHLORIDE 1 MG: 1 INJECTION, SOLUTION INTRAMUSCULAR; INTRAVENOUS; SUBCUTANEOUS at 21:38

## 2020-01-30 RX ADMIN — FENTANYL CITRATE 25 MCG: 50 INJECTION, SOLUTION INTRAMUSCULAR; INTRAVENOUS at 19:25

## 2020-01-30 RX ADMIN — MIDAZOLAM HYDROCHLORIDE 0.5 MG: 1 INJECTION, SOLUTION INTRAMUSCULAR; INTRAVENOUS at 18:57

## 2020-01-30 RX ADMIN — LISINOPRIL 5 MG: 5 TABLET ORAL at 09:20

## 2020-01-30 RX ADMIN — HYDROMORPHONE HYDROCHLORIDE 1 MG: 1 INJECTION, SOLUTION INTRAMUSCULAR; INTRAVENOUS; SUBCUTANEOUS at 14:30

## 2020-01-30 RX ADMIN — HYDROMORPHONE HYDROCHLORIDE 1 MG: 1 INJECTION, SOLUTION INTRAMUSCULAR; INTRAVENOUS; SUBCUTANEOUS at 01:21

## 2020-01-30 RX ADMIN — CALCIUM 500 MG: 500 TABLET ORAL at 17:15

## 2020-01-30 RX ADMIN — TAMSULOSIN HYDROCHLORIDE 0.4 MG: 0.4 CAPSULE ORAL at 09:20

## 2020-01-30 RX ADMIN — CHOLECALCIFEROL TAB 10 MCG (400 UNIT) 1 TABLET: 10 TAB at 09:35

## 2020-01-30 RX ADMIN — Medication 1 CAPSULE: at 09:19

## 2020-01-30 RX ADMIN — MIDAZOLAM HYDROCHLORIDE 0.5 MG: 1 INJECTION, SOLUTION INTRAMUSCULAR; INTRAVENOUS at 19:25

## 2020-01-30 RX ADMIN — HEPARIN SODIUM 1000 UNITS: 1000 INJECTION INTRAVENOUS; SUBCUTANEOUS at 18:46

## 2020-01-30 RX ADMIN — METOPROLOL TARTRATE 25 MG: 25 TABLET ORAL at 09:20

## 2020-01-30 RX ADMIN — CLOPIDOGREL BISULFATE 600 MG: 300 TABLET, FILM COATED ORAL at 21:59

## 2020-01-30 RX ADMIN — HYDROMORPHONE HYDROCHLORIDE 1 MG: 1 INJECTION, SOLUTION INTRAMUSCULAR; INTRAVENOUS; SUBCUTANEOUS at 09:18

## 2020-01-30 RX ADMIN — COLLAGENASE SANTYL: 250 OINTMENT TOPICAL at 09:35

## 2020-01-30 RX ADMIN — SODIUM CHLORIDE 100 ML/HR: 900 INJECTION, SOLUTION INTRAVENOUS at 20:44

## 2020-01-30 RX ADMIN — HEPARIN SODIUM 3000 UNITS: 1000 INJECTION INTRAVENOUS; SUBCUTANEOUS at 18:20

## 2020-01-30 RX ADMIN — Medication 10 ML: at 14:16

## 2020-01-30 RX ADMIN — FENTANYL CITRATE 25 MCG: 50 INJECTION, SOLUTION INTRAMUSCULAR; INTRAVENOUS at 18:57

## 2020-01-30 RX ADMIN — PANTOPRAZOLE SODIUM 40 MG: 40 TABLET, DELAYED RELEASE ORAL at 06:53

## 2020-01-30 RX ADMIN — HEPARIN SODIUM 2000 UNITS: 1000 INJECTION INTRAVENOUS; SUBCUTANEOUS at 19:38

## 2020-01-30 RX ADMIN — FENTANYL CITRATE 25 MCG: 50 INJECTION, SOLUTION INTRAMUSCULAR; INTRAVENOUS at 18:26

## 2020-01-30 RX ADMIN — PIPERACILLIN AND TAZOBACTAM 3.38 G: 3; .375 INJECTION, POWDER, LYOPHILIZED, FOR SOLUTION INTRAVENOUS at 21:17

## 2020-01-30 RX ADMIN — FENTANYL CITRATE 50 MCG: 50 INJECTION, SOLUTION INTRAMUSCULAR; INTRAVENOUS at 17:59

## 2020-01-30 RX ADMIN — PIPERACILLIN AND TAZOBACTAM 3.38 G: 3; .375 INJECTION, POWDER, LYOPHILIZED, FOR SOLUTION INTRAVENOUS at 09:18

## 2020-01-30 RX ADMIN — PIPERACILLIN AND TAZOBACTAM 3.38 G: 3; .375 INJECTION, POWDER, LYOPHILIZED, FOR SOLUTION INTRAVENOUS at 16:25

## 2020-01-30 RX ADMIN — Medication 10 ML: at 22:00

## 2020-01-30 RX ADMIN — LORATADINE 10 MG: 10 TABLET ORAL at 09:21

## 2020-01-30 RX ADMIN — ATORVASTATIN CALCIUM 80 MG: 20 TABLET, FILM COATED ORAL at 21:23

## 2020-01-30 RX ADMIN — ENOXAPARIN SODIUM 40 MG: 40 INJECTION, SOLUTION INTRAVENOUS; SUBCUTANEOUS at 21:37

## 2020-01-30 RX ADMIN — VANCOMYCIN HYDROCHLORIDE 1250 MG: 1.25 INJECTION, POWDER, LYOPHILIZED, FOR SOLUTION INTRAVENOUS at 14:17

## 2020-01-30 RX ADMIN — VANCOMYCIN HYDROCHLORIDE 1250 MG: 1.25 INJECTION, POWDER, LYOPHILIZED, FOR SOLUTION INTRAVENOUS at 02:36

## 2020-01-30 RX ADMIN — Medication 10 ML: at 14:17

## 2020-01-30 RX ADMIN — HEPARIN SODIUM 5000 UNITS: 1000 INJECTION INTRAVENOUS; SUBCUTANEOUS at 18:05

## 2020-01-30 RX ADMIN — SERTRALINE HYDROCHLORIDE 100 MG: 50 TABLET ORAL at 09:19

## 2020-01-30 RX ADMIN — SODIUM CHLORIDE 25 ML/HR: 900 INJECTION, SOLUTION INTRAVENOUS at 17:45

## 2020-01-30 RX ADMIN — MONTELUKAST 10 MG: 10 TABLET, FILM COATED ORAL at 09:20

## 2020-01-30 RX ADMIN — SODIUM CHLORIDE 25 ML/HR: 900 INJECTION, SOLUTION INTRAVENOUS at 19:46

## 2020-01-30 RX ADMIN — MIDAZOLAM HYDROCHLORIDE 0.5 MG: 1 INJECTION, SOLUTION INTRAMUSCULAR; INTRAVENOUS at 18:52

## 2020-01-30 RX ADMIN — VERAPAMIL HYDROCHLORIDE 100 ML/HR: 2.5 INJECTION INTRAVENOUS at 18:56

## 2020-01-30 RX ADMIN — Medication 10 ML: at 06:00

## 2020-01-30 RX ADMIN — AMLODIPINE BESYLATE 10 MG: 5 TABLET ORAL at 09:21

## 2020-01-30 RX ADMIN — IOPAMIDOL 50 ML: 510 INJECTION, SOLUTION INTRAVASCULAR at 18:05

## 2020-01-30 RX ADMIN — CALCIUM 500 MG: 500 TABLET ORAL at 09:21

## 2020-01-30 RX ADMIN — ALLOPURINOL 100 MG: 100 TABLET ORAL at 09:21

## 2020-01-30 RX ADMIN — MIDAZOLAM HYDROCHLORIDE 0.5 MG: 1 INJECTION, SOLUTION INTRAMUSCULAR; INTRAVENOUS at 18:26

## 2020-01-30 RX ADMIN — FENTANYL CITRATE 25 MCG: 50 INJECTION, SOLUTION INTRAMUSCULAR; INTRAVENOUS at 18:52

## 2020-01-30 RX ADMIN — LIDOCAINE HYDROCHLORIDE ANHYDROUS 2 ML: 10 INJECTION, SOLUTION INFILTRATION at 18:02

## 2020-01-30 RX ADMIN — FENTANYL CITRATE 25 MCG: 50 INJECTION, SOLUTION INTRAMUSCULAR; INTRAVENOUS at 18:35

## 2020-01-30 RX ADMIN — PIPERACILLIN AND TAZOBACTAM 3.38 G: 3; .375 INJECTION, POWDER, LYOPHILIZED, FOR SOLUTION INTRAVENOUS at 01:20

## 2020-01-30 RX ADMIN — Medication 81 MG: at 09:20

## 2020-01-30 RX ADMIN — HEPARIN SODIUM 2000 UNITS: 200 INJECTION, SOLUTION INTRAVENOUS at 17:45

## 2020-01-30 NOTE — PROGRESS NOTES
Met the patient, interviewed and examined him, reviewed chart: studies, labs and notes. Pt has b/l palpable femoral pulses but has a non palpable left PT. Left Dp obscured by dressing. Rt DP palpable.   Consent d/w pt

## 2020-01-30 NOTE — PROGRESS NOTES
Patient Name: Miguelina Juárez   Age: 77 y.o. Sex:  male  YOB: 1950   Medical Record Number: [id number]      Antimicrobial  Vancomycin   Indication Bone / Joint Infection (including Osteomyelitis)   Dose / Interval           Current Antimicrobial Therapy (168h ago, onward)      Ordered     Start Stop    01/27/20 1922  vancomycin (VANCOCIN) 1,250 mg in 0.9% sodium chloride 250 mL (VIAL-MATE)  1,250 mg,   IntraVENous,   EVERY 12 HOURS      01/28/20 0800 --    01/27/20 1652  piperacillin-tazobactam (ZOSYN) 3.375 g in 0.9% sodium chloride (MBP/ADV) 100 mL MBP  3.375 g,   IntraVENous,   EVERY 6 HOURS      01/27/20 1700 --               Last Level (if applicable) Lab Results   Component Value Date/Time    Reported dose: 1250 MG 01/30/2020 01:10 AM    Reported dose time: 5216 01/30/2020 01:10 AM    Reported dose date: 88157769 01/30/2020 01:10 AM     Vancomycin   Lab Results   Component Value Date/Time    Vancomycin,trough 17.1 01/30/2020 01:10 AM      Gentamicin   No results found for: GENP, GENT, GENR]  Tobramycin   No results found for: TOBP, TOBT, TOBR   Amikacin   No results found for: AMIKP, DAMIKP, AMIKT, DAMIKT, DAMIKR     Cultures (7 most recent)   GRAM STAIN   Date Value Ref Range Status   01/27/2020 FEW WBC'S   Preliminary   01/27/2020 RARE GRAM POSITIVE RODS   Preliminary   01/27/2020 RARE GRAM NEGATIVE RODS   Preliminary     Culture result:   Date Value Ref Range Status   01/27/2020 MODERATE GRAM NEGATIVE RODS (A)   Preliminary   01/27/2020 FEW DIPHTHEROIDS (A)   Preliminary   07/02/2019 FEW KLEBSIELLA PNEUMONIAE (A)   Final   07/02/2019 FEW ESCHERICHIA COLI (A)   Final   07/02/2019 RARE CANDIDA ALBICANS (A)   Final   07/02/2019 NO ANAEROBES ISOLATED 5 DAYS   Final      Renal Overview (72 hr)         Recent Labs     01/30/20  0110 01/29/20  0450 01/28/20  0450   BUN 16 17 23*   CREA 0.93 0.83 0.99       CrCl (Current): Estimated Creatinine Clearance: 76 mL/min (based on SCr of 0.93 mg/dL). Temp (24-hr Max) Temp (24hrs), Av.8 °F (37.1 °C), Min:98 °F (36.7 °C), Max:99.9 °F (37.7 °C)       Hematology Recent Labs     20  0110 20  0450 20  0450 20  1718   WBC 7.7 11.6 13.5* 10.3   HGB 9.9* 10.2* 10.3* 11.6*   HCT 30.8* 31.6* 31.9* 36.6   * 625* 588* 664*   GRANS  --   --   --  68   ANEU  --   --   --  7.0        DOT  4     Notes Vancomycin trough = 17.1mcg/ml at 01:10. Patient is therapeutic within goal trough of 15-20mcg/ml. Will continue dose at 1250mg IV q12h. Pharmacy to follow daily and will make changes to dose and/or frequency based on clinical status.        Alon Nunes [phone]

## 2020-01-30 NOTE — PROGRESS NOTES
TRANSFER - OUT REPORT:    Verbal report given to Nancy Montero RN/Linda PATRICIO on [employer]  being transferred to [facility]) for ordered procedure       Report consisted of patients Situation, Background, Assessment and   Recommendations(SBAR). Information from the following report(s) SBAR, Kardex and MAR was reviewed with the receiving nurse. Lines:   PICC Double Lumen 03/59/98 Right;Basilic (Active)   Central Line Being Utilized Yes 1/30/2020  8:30 AM   Criteria for Appropriate Use Long term IV/antibiotic administration 1/30/2020  8:30 AM   Site Assessment Clean, dry, & intact 1/30/2020  8:30 AM   Phlebitis Assessment 0 1/30/2020  8:30 AM   Infiltration Assessment 0 1/30/2020  8:30 AM   Date of Last Dressing Change 01/28/20 1/30/2020  8:30 AM   Dressing Status Clean, dry, & intact 1/30/2020  8:30 AM   Action Taken Open ports on tubing capped 1/30/2020  8:30 AM   Dressing Type Disk with Chlorhexadine gluconate (CHG) 1/30/2020  8:30 AM   Hub Color/Line Status Purple 1/30/2020  8:30 AM   Positive Blood Return (Site #1) Yes 1/30/2020  8:30 AM   Hub Color/Line Status Red 1/30/2020  8:30 AM   Positive Blood Return (Site #2) Yes 1/30/2020  8:30 AM   Alcohol Cap Used Yes 1/30/2020  8:30 AM       Peripheral IV 48/55/56 Right Cephalic (Active)   Site Assessment Clean, dry, & intact 1/30/2020  8:30 AM   Phlebitis Assessment 0 1/30/2020  8:30 AM   Infiltration Assessment 0 1/30/2020  8:30 AM   Dressing Status Clean, dry, & intact 1/30/2020  8:30 AM   Dressing Type Transparent;Tape 1/30/2020  8:30 AM   Hub Color/Line Status Patent; Flushed; Infusing 1/30/2020  8:30 AM   Action Taken Other (comment) 1/30/2020  8:30 AM   Alcohol Cap Used Yes 1/30/2020  8:30 AM        Opportunity for questions and clarification was provided. Signals present post procedure Right PT and DP. Signal PT present Left-by Dr. Luan Millre.     Patient transported with:   Registered Nurse

## 2020-01-30 NOTE — PROGRESS NOTES
Progress Note      Patient: Emely Dobbs               Sex: male          DOA: 1/27/2020       YOB: 1950      Age:  77 y.o.        LOS:  LOS: 2 days               Subjective:   Patient seen today for follow up. Pt feeling better with respect to pain. No complaints. NO FCNV. He refused the wound vac bc of pain.     Medications:     Current Facility-Administered Medications:     HYDROmorphone (PF) (DILAUDID) injection 1 mg, 1 mg, IntraVENous, Q4H PRN, Alber, Mertha Damme, DPM, 1 mg at 01/29/20 1832    collagenase (SANTYL) 250 unit/gram ointment, , Topical, DAILY, Alber, Mertha Damme, DPM    [START ON 1/30/2020] Vancomycin Trough due 1/30/20 at 01:30 (30 minutes prior to 02:00 dose), 1 Each, Other, ONCE, Alber, Mertha Damme, DPM    allopurinoL (ZYLOPRIM) tablet 100 mg, 100 mg, Oral, DAILY, Brodie Henao MD, 100 mg at 01/29/20 0835    amLODIPine (NORVASC) tablet 10 mg, 10 mg, Oral, DAILY, Brodie Henao MD, 10 mg at 01/29/20 3430    aspirin delayed-release tablet 81 mg, 81 mg, Oral, DAILY, Brodie Heano MD, 81 mg at 01/29/20 0835    atorvastatin (LIPITOR) tablet 80 mg, 80 mg, Oral, QHS, Brodie Henao MD, 80 mg at 01/28/20 2300    calcium carbonate (OS-CORBY) tablet 500 mg [elemental], 500 mg, Oral, BID WITH MEALS, Brodie Henao MD, 500 mg at 01/29/20 1832    cholecalciferol (VITAMIN D3) (400 Units /10 mcg) tablet 1 Tab, 400 Units, Oral, DAILY, Brodie Henao MD, 1 Tab at 01/29/20 0954    loratadine (CLARITIN) tablet 10 mg, 10 mg, Oral, DAILY, Brodie Henao MD, 10 mg at 01/29/20 0836    lisinopril (PRINIVIL, ZESTRIL) tablet 5 mg, 5 mg, Oral, DAILY, Brodie Henao MD, 5 mg at 01/29/20 0835    metoprolol tartrate (LOPRESSOR) tablet 25 mg, 25 mg, Oral, DAILY, Brodie Henao MD, 25 mg at 01/29/20 0836    montelukast (SINGULAIR) tablet 10 mg, 10 mg, Oral, DAILY, Brodie Henao MD, 10 mg at 01/29/20 0836    fish oil-omega-3 fatty acids 340-1,000 mg capsule 1 Cap, 1 Cap, Oral, DAILY, Tarik Canales MD, 1 Cap at 01/29/20 0878    pantoprazole (PROTONIX) tablet 40 mg, 40 mg, Oral, ACB, Brodie Henao MD, 40 mg at 01/29/20 1125    sertraline (ZOLOFT) tablet 100 mg, 100 mg, Oral, DAILY, Brodie Brownlee MD, 100 mg at 01/29/20 0835    tamsulosin (FLOMAX) capsule 0.4 mg, 0.4 mg, Oral, DAILY, Brodie Brownlee MD, 0.4 mg at 01/29/20 0834    lidocaine (XYLOCAINE) 10 mg/mL (1 %) injection 1-20 mL, 1-20 mL, SubCUTAneous, RAD CONTINUOUS, Sue WOODWARD MD, 5 mL at 01/28/20 1007    heparin (porcine) 100 unit/mL injection 500 Units, 500 Units, InterCATHeter, Q8H PRN, Blade Mcknight MD, 500 Units at 01/28/20 1008    sodium chloride (NS) flush 5-40 mL, 5-40 mL, IntraVENous, Q8H, Salvador Campo DPM, 10 mL at 01/29/20 1345    sodium chloride (NS) flush 5-40 mL, 5-40 mL, IntraVENous, PRN, Rajat Campo DPM    piperacillin-tazobactam (ZOSYN) 3.375 g in 0.9% sodium chloride (MBP/ADV) 100 mL MBP, 3.375 g, IntraVENous, Q6H, Rajat Campo DPM, Last Rate: 200 mL/hr at 01/29/20 1344, 3.375 g at 01/29/20 1344    oxyCODONE-acetaminophen (PERCOCET 7.5) 7.5-325 mg per tablet 1 Tab, 1 Tab, Oral, Q4H PRN, Rajat Campo DPM, 1 Tab at 01/29/20 0359    enoxaparin (LOVENOX) injection 40 mg, 40 mg, SubCUTAneous, Q24H, Brodie Henao MD, 40 mg at 01/29/20 1832    Vancomycin-pharmacy to consult, 1 Each, Other, Rx Dosing/Monitoring, Rajat Campo DPM    vancomycin (VANCOCIN) 1,250 mg in 0.9% sodium chloride 250 mL (VIAL-MATE), 1,250 mg, IntraVENous, Q12H, Salvador Campo DPM, 1,250 mg at 01/29/20 1434          Objective:      Visit Vitals  /54 (BP 1 Location: Left arm, BP Patient Position: At rest)   Pulse 73   Temp 98.9 °F (37.2 °C)   Resp 16   Ht 5' 9\" (1.753 m)   Wt 80.3 kg (177 lb)   SpO2 92%   BMI 26.14 kg/m²       Physical Exam:    Wound appears less erythematous but still necrotic.    Exposed hw still noted    Labs:  Recent Results (from the past 25 hour(s))   METABOLIC PANEL, BASIC    Collection Time: 01/29/20  4:50 AM   Result Value Ref Range    Sodium 135 (L) 136 - 145 mmol/L    Potassium 4.2 3.5 - 5.5 mmol/L    Chloride 104 100 - 111 mmol/L    CO2 26 21 - 32 mmol/L    Anion gap 5 3.0 - 18 mmol/L    Glucose 98 74 - 99 mg/dL    BUN 17 7.0 - 18 MG/DL    Creatinine 0.83 0.6 - 1.3 MG/DL    BUN/Creatinine ratio 20 12 - 20      GFR est AA >60 >60 ml/min/1.73m2    GFR est non-AA >60 >60 ml/min/1.73m2    Calcium 8.5 8.5 - 10.1 MG/DL   CBC W/O DIFF    Collection Time: 01/29/20  4:50 AM   Result Value Ref Range    WBC 11.6 4.6 - 13.2 K/uL    RBC 3.48 (L) 4.70 - 5.50 M/uL    HGB 10.2 (L) 13.0 - 16.0 g/dL    HCT 31.6 (L) 36.0 - 48.0 %    MCV 90.8 74.0 - 97.0 FL    MCH 29.3 24.0 - 34.0 PG    MCHC 32.3 31.0 - 37.0 g/dL    RDW 16.1 (H) 11.6 - 14.5 %    PLATELET 484 (H) 371 - 420 K/uL    MPV 9.0 (L) 9.2 - 11.8 FL   DUPLEX LOWER EXT ARTERY BILAT    Collection Time: 01/29/20  3:00 PM   Result Value Ref Range    Right CFA dist sys .6 cm/s    Right super femoral dist sys .3 cm/s    Right super femoral prox sys PSV 95.7 cm/s    Right Prox PFA A PSV 47.3 cm/s    Right popliteal dist sys PSV 71.5 cm/s    Right popliteal prox sys PSV 68.3 cm/s    Right Dist PTA PSV 16.8 cm/s    Right mid PTA sys .9 cm/s    Right Prox PTA PSV 46.4 cm/s    Right Mid JERSON Velocity 68.0 cm/s    Left CFA dist sys .1 cm/s    Left super femoral dist sys PSV 84.2 cm/s    Left super femoral mid sys .8 cm/s    Left super femoral prox sys .4 cm/s    Left Prox PFA A PSV 56.4 cm/s    Left popliteal dist sys PSV 57.0 cm/s    Left popliteal prox sys PSV 77.3 cm/s    Left Dist PTA PSV 90.9 cm/s    Left mid PTA sys PSV 53.7 cm/s    Left Dist JERSON Velocity 61.8 cm/s    Left Mid JERSON Velocity 56.1 cm/s    Right SFA Prox Robert Ratio 0.8     Right Pop A Prox Robert Ratio 0.62     Left SFA Prox Robert Ratio 1.15     Left SFA Mid Robert Ratio 0.86     Left SFA Dist Robert Ratio 0.77     Left Pop A Prox Robert Ratio 0.92            Assessment/Plan     Principal Problem:    Infection and inflammatory reaction due to other internal orthopedic prosthetic devices, implants and grafts, initial encounter ([facility] [address] (1/29/2020)    Active Problems: Aortic stenosis (12/5/2016)      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 Appreciate input from Taryn Ortiz and Maxim. Pt for angioplasty tomorrow and we will take a wait and see approach as to whether the fusion can be salvaged or we will consider antibiotic spacer or amputation.

## 2020-01-30 NOTE — PROGRESS NOTES
Pt with plan for aortogram with left leg angiogram this afternoon. Noted ID has indicated pt will require IVABX upon discharge. Pt will have a copay with home infusion. CM to await ID recommendation for IVABX to assist with identifying an appropriate transition of care (ie [facility]IC, HH and Bio [facility] vs SNF). CM to continue to follow and assist.    Care Management Interventions  PCP Verified by CM:  Yes  Transition of Care Consult (CM Consult): Discharge Planning  Health Maintenance Reviewed: Yes  Current Support Network: Lives with Spouse  Confirm Follow Up Transport: Family

## 2020-01-30 NOTE — PROGRESS NOTES
Lower extremity arterial duplex results reviewed. Bilateral tibial arterial occlusive disease. Thus, will plan for aortogram with left leg angiogram this afternoon.

## 2020-01-30 NOTE — PROGRESS NOTES
Bedside and Verbal shift change report given to Alfonso Garcia RN (oncoming nurse) by Shantelle Cesar RN (offgoing nurse). Report included the following information SBAR, Kardex, Intake/Output and MAR. Patient A/OX4. Patient in bed resting quielty, no complaints of nausea at this time. Patient requesting pain medication at this time. Will bring promptly. 0840-NPO orders placed for procedure later today. Patient aware and compliant. 0918-Patient requested something for pain. Administered Dilaudid 1MG IV.     1435-Patient requested something for pain. Administered Dilaudid 1MG IV.     1725-Patient taken down to angio. Patient Vitals for the past 12 hrs:   Temp Pulse Resp BP SpO2   01/30/20 1830 -- 63 13 -- 98 %   01/30/20 1825 -- 62 11 131/74 97 %   01/30/20 1820 -- 63 11 123/72 96 %   01/30/20 1815 -- 64 11 119/70 98 %   01/30/20 1810 -- 66 13 107/66 96 %   01/30/20 1805 -- 62 14 107/67 95 %   01/30/20 1800 -- 63 14 112/68 93 %   01/30/20 1755 -- 66 13 129/76 97 %   01/30/20 1750 -- 65 10 131/73 100 %   01/30/20 1517 98.8 °F (37.1 °C) 64 18 -- 91 %   01/30/20 1129 98.1 °F (36.7 °C) 67 18 122/57 95 %   01/30/20 0738 98.3 °F (36.8 °C) 73 18 132/73 97 %     Bedside and Verbal shift change report given to Nishant Pacheco RN (oncoming nurse) by Alfonso Garcia RN (offgoing nurse). Report included the following information SBAR, Kardex, Intake/Output and MAR.

## 2020-01-30 NOTE — PROGRESS NOTES
Hospitalist Progress Note    Patient: Prentiss Curling MRN: [id number]  CSN: [id number]    YOB: 1950  Age: 77 y.o. Sex: male    DOA: 1/27/2020 LOS:  LOS: 3 days                Assessment/Plan     Patient Active Problem List   Diagnosis Code    Enlarged prostate with lower urinary tract symptoms (LUTS) N40.1    Calculus of both kidneys N20.0    Rheumatoid arthritis ([facility] M06.9    Essential hypertension I10    GERD (gastroesophageal reflux disease) K21.9    Disorder of bone and cartilage, unspecified M89.9, M94.9    Depression F32.9    Aortic stenosis I35.0    Gout M10.9    Impaired fasting blood sugar R73.01    Male erectile dysfunction, unspecified N52.9    Anxiety state F41.1    Chronic allergic rhinitis J30.9    History of aortic valve replacement Z95.2    Deviated nasal septum J34.2    Chronic maxillary sinusitis J32.0    Multiple-type hyperlipidemia E78.2    SHAILESH (obstructive sleep apnea) G47.33    Sensorineural hearing loss (SNHL) of both ears H90.3    Cellulitis L03.90    Cellulitis of foot, left L03. 116    Rheumatoid aortitis I01.1    Osteomyelitis of ankle or foot, acute, left (HCC) M86.172    Cellulitis and abscess of lower extremity L03.119, L02.419    Infection and inflammatory reaction due to other internal orthopedic prosthetic devices, implants and grafts, initial encounter ([facility] T84. 7XXA            Patient admitted by podiatry service,     Left foot cellulitis/wound dehiscence/exposed hardware-  Started on vancomycin and Zosyn, follow wound cultures. Seen by plastic surgery, ID, vascular  For angiogram today     Hypertension-  Continue with home medications monitor blood pressure.     History of rheumatoid arthritis-  He is on Enbrel at home which was stopped because of wound healing.   We will continue to hold Enbrel at this point.     History of aortic valve replacement-  Bovine not on any anticoagulation.     GERD-  Continue with PPI     Gout-  Continue with allopurinol.     BPH-  Continue with Flomax     DVT prophylaxis with Lovenox    Disposition : per primary    Review of systems  General: No fevers or chills. Cardiovascular: No chest pain or pressure. No palpitations. Pulmonary: No shortness of breath. Gastrointestinal: No nausea, vomiting. Physical Exam:  General: Awake, cooperative, no acute distress    HEENT: NC, Atraumatic. PERRLA, anicteric sclerae. Lungs: CTA Bilaterally. No Wheezing/Rhonchi/Rales. Heart:  S1 S2,  No murmur, No Rubs, No Gallops  Abdomen: Soft, Non distended, Non tender.  +Bowel sounds,   Extremities: Left foot great toe with open wound and exposed hardware. Psych:   Not anxious or agitated. Neurologic:  No acute neurological deficit. Vital signs/Intake and Output:  Visit Vitals  /57 (BP 1 Location: Left arm, BP Patient Position: At rest)   Pulse 64   Temp 98.8 °F (37.1 °C)   Resp 18   Ht 5' 9\" (1.753 m)   Wt 82.8 kg (182 lb 8 oz)   SpO2 91%   BMI 26.95 kg/m²     Current Shift:  No intake/output data recorded. Last three shifts:  01/28 1901 - 01/30 0700  In: 120 [P.O.:120]  Out: 2100 [Urine:2100]            Labs: Results:       Chemistry Recent Labs     01/30/20 0110 01/29/20 0450 01/28/20  0450   * 98 100*    135* 140   K 3.9 4.2 4.7    104 108   CO2 27 26 29   BUN 16 17 23*   CREA 0.93 0.83 0.99   CA 8.5 8.5 8.8   AGAP 5 5 3   BUCR 17 20 23*      CBC w/Diff Recent Labs     01/30/20 0110 01/29/20  0450 01/28/20  0450 01/27/20  1718   WBC 7.7 11.6 13.5* 10.3   RBC 3.37* 3.48* 3.51* 3.98*   HGB 9.9* 10.2* 10.3* 11.6*   HCT 30.8* 31.6* 31.9* 36.6   * 625* 588* 664*   GRANS  --   --   --  68   LYMPH  --   --   --  17*   EOS  --   --   --  7*      Cardiac Enzymes No results for input(s): CPK, CKND1, JESSI in the last 72 hours. No lab exists for component: CKRMB, TROIP   Coagulation No results for input(s): PTP, INR, APTT, INREXT, INREXT in the last 72 hours.     Lipid Panel No results found for: CHOL, CHOLPOCT, CHOLX, CHLST, CHOLV, 334136, HDL, HDLP, LDL, LDLC, DLDLP, 700292, VLDLC, VLDL, TGLX, TRIGL, TRIGP, TGLPOCT, CHHD, CHHDX   BNP No results for input(s): BNPP in the last 72 hours. Liver Enzymes No results for input(s): TP, ALB, TBIL, AP, SGOT, GPT in the last 72 hours.     No lab exists for component: DBIL   Thyroid Studies No results found for: T4, T3U, TSH, TSHEXT, TSHEXT     Procedures/imaging: see electronic medical records for all procedures/Xrays and details which were not copied into this note but were reviewed prior to creation of Plan

## 2020-01-31 ENCOUNTER — ANESTHESIA (OUTPATIENT)
Dept: SURGERY | Age: 70
DRG: 504 | End: 2020-01-31
Payer: MEDICARE

## 2020-01-31 ENCOUNTER — ANESTHESIA EVENT (OUTPATIENT)
Dept: SURGERY | Age: 70
DRG: 504 | End: 2020-01-31
Payer: MEDICARE

## 2020-01-31 ENCOUNTER — APPOINTMENT (OUTPATIENT)
Dept: VASCULAR SURGERY | Age: 70
DRG: 504 | End: 2020-01-31
Attending: SURGERY
Payer: MEDICARE

## 2020-01-31 LAB
ANION GAP SERPL CALC-SCNC: 7 MMOL/L (ref 3–18)
BUN SERPL-MCNC: 12 MG/DL (ref 7–18)
BUN/CREAT SERPL: 15 (ref 12–20)
CALCIUM SERPL-MCNC: 8 MG/DL (ref 8.5–10.1)
CHLORIDE SERPL-SCNC: 107 MMOL/L (ref 100–111)
CO2 SERPL-SCNC: 25 MMOL/L (ref 21–32)
CREAT SERPL-MCNC: 0.8 MG/DL (ref 0.6–1.3)
ERYTHROCYTE [DISTWIDTH] IN BLOOD BY AUTOMATED COUNT: 15.8 % (ref 11.6–14.5)
GLUCOSE SERPL-MCNC: 139 MG/DL (ref 74–99)
HCT VFR BLD AUTO: 32.1 % (ref 36–48)
HGB BLD-MCNC: 10.4 G/DL (ref 13–16)
LEFT DIST ATA VELOCITY: 103.9 CM/S
LEFT DIST PTA PSV: 134.6 CM/S
LEFT POPLITEAL DIST SYS PSV: 93.6 CM/S
LEFT PTA MID SYS PSV: 118.4 CM/S
MCH RBC QN AUTO: 29.3 PG (ref 24–34)
MCHC RBC AUTO-ENTMCNC: 32.4 G/DL (ref 31–37)
MCV RBC AUTO: 90.4 FL (ref 74–97)
PLATELET # BLD AUTO: 704 K/UL (ref 135–420)
PMV BLD AUTO: 9.2 FL (ref 9.2–11.8)
POTASSIUM SERPL-SCNC: 3.7 MMOL/L (ref 3.5–5.5)
RBC # BLD AUTO: 3.55 M/UL (ref 4.7–5.5)
RIGHT POP A PROX VEL RATIO: 0.69
RIGHT POPLITEAL PROX SYS PSV: 61.8 CM/S
RIGHT PROX PFA A PSV: 71.5 CM/S
RIGHT SFA DIST VEL RATIO: 0.89
RIGHT SFA MID VEL RATIO: 1
RIGHT SUPER FEMORAL DIST SYS PSV: 89.6 CM/S
RIGHT SUPER FEMORAL MID SYS PSV: 100.6 CM/S
RIGHT SUPER FEMORAL PROX SYS PSV: 105 CM/S
SODIUM SERPL-SCNC: 139 MMOL/L (ref 136–145)
WBC # BLD AUTO: 7.9 K/UL (ref 4.6–13.2)

## 2020-01-31 PROCEDURE — 74011000258 HC RX REV CODE- 258: Performed by: PODIATRIST

## 2020-01-31 PROCEDURE — 0QBP0ZZ EXCISION OF LEFT METATARSAL, OPEN APPROACH: ICD-10-PCS | Performed by: PODIATRIST

## 2020-01-31 PROCEDURE — 74011000250 HC RX REV CODE- 250: Performed by: NURSE ANESTHETIST, CERTIFIED REGISTERED

## 2020-01-31 PROCEDURE — 77030040361 HC SLV COMPR DVT MDII -B: Performed by: PODIATRIST

## 2020-01-31 PROCEDURE — 76010000154 HC OR TIME FIRST 0.5 HR: Performed by: PODIATRIST

## 2020-01-31 PROCEDURE — 77030020255 HC SOL INJ LR 1000ML BG: Performed by: PODIATRIST

## 2020-01-31 PROCEDURE — 76210000063 HC OR PH I REC FIRST 0.5 HR: Performed by: PODIATRIST

## 2020-01-31 PROCEDURE — 74011250637 HC RX REV CODE- 250/637: Performed by: PODIATRIST

## 2020-01-31 PROCEDURE — 77010033678 HC OXYGEN DAILY

## 2020-01-31 PROCEDURE — 77030020782 HC GWN BAIR PAWS FLX 3M -B: Performed by: PODIATRIST

## 2020-01-31 PROCEDURE — 74011250637 HC RX REV CODE- 250/637: Performed by: HOSPITALIST

## 2020-01-31 PROCEDURE — 74011250636 HC RX REV CODE- 250/636: Performed by: SURGERY

## 2020-01-31 PROCEDURE — 76060000031 HC ANESTHESIA FIRST 0.5 HR: Performed by: PODIATRIST

## 2020-01-31 PROCEDURE — 74011250636 HC RX REV CODE- 250/636: Performed by: NURSE ANESTHETIST, CERTIFIED REGISTERED

## 2020-01-31 PROCEDURE — 74011250637 HC RX REV CODE- 250/637: Performed by: SURGERY

## 2020-01-31 PROCEDURE — 74011250636 HC RX REV CODE- 250/636: Performed by: HOSPITALIST

## 2020-01-31 PROCEDURE — 65270000029 HC RM PRIVATE

## 2020-01-31 PROCEDURE — 74011000250 HC RX REV CODE- 250: Performed by: PODIATRIST

## 2020-01-31 PROCEDURE — 85027 COMPLETE CBC AUTOMATED: CPT

## 2020-01-31 PROCEDURE — 93926 LOWER EXTREMITY STUDY: CPT

## 2020-01-31 PROCEDURE — 74011250636 HC RX REV CODE- 250/636: Performed by: PODIATRIST

## 2020-01-31 PROCEDURE — 80048 BASIC METABOLIC PNL TOTAL CA: CPT

## 2020-01-31 PROCEDURE — 74011250636 HC RX REV CODE- 250/636: Performed by: ANESTHESIOLOGY

## 2020-01-31 RX ORDER — MIDAZOLAM HYDROCHLORIDE 1 MG/ML
INJECTION, SOLUTION INTRAMUSCULAR; INTRAVENOUS AS NEEDED
Status: DISCONTINUED | OUTPATIENT
Start: 2020-01-31 | End: 2020-01-31 | Stop reason: HOSPADM

## 2020-01-31 RX ORDER — BUPIVACAINE HYDROCHLORIDE 5 MG/ML
INJECTION, SOLUTION EPIDURAL; INTRACAUDAL AS NEEDED
Status: DISCONTINUED | OUTPATIENT
Start: 2020-01-31 | End: 2020-01-31 | Stop reason: HOSPADM

## 2020-01-31 RX ORDER — KETAMINE HYDROCHLORIDE 10 MG/ML
INJECTION, SOLUTION INTRAMUSCULAR; INTRAVENOUS AS NEEDED
Status: DISCONTINUED | OUTPATIENT
Start: 2020-01-31 | End: 2020-01-31 | Stop reason: HOSPADM

## 2020-01-31 RX ORDER — PROPOFOL 10 MG/ML
INJECTION, EMULSION INTRAVENOUS AS NEEDED
Status: DISCONTINUED | OUTPATIENT
Start: 2020-01-31 | End: 2020-01-31 | Stop reason: HOSPADM

## 2020-01-31 RX ORDER — FENTANYL CITRATE 50 UG/ML
50 INJECTION, SOLUTION INTRAMUSCULAR; INTRAVENOUS AS NEEDED
Status: DISCONTINUED | OUTPATIENT
Start: 2020-01-31 | End: 2020-01-31 | Stop reason: HOSPADM

## 2020-01-31 RX ORDER — SODIUM CHLORIDE 0.9 % (FLUSH) 0.9 %
5-40 SYRINGE (ML) INJECTION EVERY 8 HOURS
Status: DISCONTINUED | OUTPATIENT
Start: 2020-01-31 | End: 2020-01-31 | Stop reason: HOSPADM

## 2020-01-31 RX ORDER — HYDROCODONE BITARTRATE AND ACETAMINOPHEN 5; 325 MG/1; MG/1
1 TABLET ORAL AS NEEDED
Status: DISCONTINUED | OUTPATIENT
Start: 2020-01-31 | End: 2020-01-31 | Stop reason: HOSPADM

## 2020-01-31 RX ORDER — SODIUM CHLORIDE, SODIUM LACTATE, POTASSIUM CHLORIDE, CALCIUM CHLORIDE 600; 310; 30; 20 MG/100ML; MG/100ML; MG/100ML; MG/100ML
125 INJECTION, SOLUTION INTRAVENOUS CONTINUOUS
Status: DISCONTINUED | OUTPATIENT
Start: 2020-01-31 | End: 2020-01-31 | Stop reason: HOSPADM

## 2020-01-31 RX ORDER — HYDROMORPHONE HYDROCHLORIDE 2 MG/ML
0.5 INJECTION, SOLUTION INTRAMUSCULAR; INTRAVENOUS; SUBCUTANEOUS
Status: DISCONTINUED | OUTPATIENT
Start: 2020-01-31 | End: 2020-01-31 | Stop reason: HOSPADM

## 2020-01-31 RX ORDER — SODIUM CHLORIDE, SODIUM LACTATE, POTASSIUM CHLORIDE, CALCIUM CHLORIDE 600; 310; 30; 20 MG/100ML; MG/100ML; MG/100ML; MG/100ML
125 INJECTION, SOLUTION INTRAVENOUS CONTINUOUS
Status: DISCONTINUED | OUTPATIENT
Start: 2020-01-31 | End: 2020-02-04 | Stop reason: HOSPADM

## 2020-01-31 RX ORDER — NALOXONE HYDROCHLORIDE 0.4 MG/ML
0.4 INJECTION, SOLUTION INTRAMUSCULAR; INTRAVENOUS; SUBCUTANEOUS AS NEEDED
Status: DISCONTINUED | OUTPATIENT
Start: 2020-01-31 | End: 2020-01-31 | Stop reason: HOSPADM

## 2020-01-31 RX ORDER — FLUMAZENIL 0.1 MG/ML
0.2 INJECTION INTRAVENOUS
Status: DISCONTINUED | OUTPATIENT
Start: 2020-01-31 | End: 2020-01-31 | Stop reason: HOSPADM

## 2020-01-31 RX ORDER — ONDANSETRON 2 MG/ML
INJECTION INTRAMUSCULAR; INTRAVENOUS AS NEEDED
Status: DISCONTINUED | OUTPATIENT
Start: 2020-01-31 | End: 2020-01-31 | Stop reason: HOSPADM

## 2020-01-31 RX ORDER — LIDOCAINE HYDROCHLORIDE 20 MG/ML
INJECTION, SOLUTION EPIDURAL; INFILTRATION; INTRACAUDAL; PERINEURAL AS NEEDED
Status: DISCONTINUED | OUTPATIENT
Start: 2020-01-31 | End: 2020-01-31 | Stop reason: HOSPADM

## 2020-01-31 RX ORDER — PROPOFOL 10 MG/ML
INJECTION, EMULSION INTRAVENOUS
Status: DISCONTINUED | OUTPATIENT
Start: 2020-01-31 | End: 2020-01-31 | Stop reason: HOSPADM

## 2020-01-31 RX ORDER — SODIUM CHLORIDE 0.9 % (FLUSH) 0.9 %
5-40 SYRINGE (ML) INJECTION AS NEEDED
Status: DISCONTINUED | OUTPATIENT
Start: 2020-01-31 | End: 2020-01-31 | Stop reason: HOSPADM

## 2020-01-31 RX ADMIN — CLOPIDOGREL BISULFATE 75 MG: 75 TABLET ORAL at 11:17

## 2020-01-31 RX ADMIN — OXYCODONE HYDROCHLORIDE AND ACETAMINOPHEN 1 TABLET: 7.5; 325 TABLET ORAL at 21:06

## 2020-01-31 RX ADMIN — ATORVASTATIN CALCIUM 80 MG: 20 TABLET, FILM COATED ORAL at 23:57

## 2020-01-31 RX ADMIN — PROPOFOL 40 MG: 10 INJECTION, EMULSION INTRAVENOUS at 17:00

## 2020-01-31 RX ADMIN — SERTRALINE HYDROCHLORIDE 100 MG: 50 TABLET ORAL at 10:43

## 2020-01-31 RX ADMIN — PROPOFOL 40 MG: 10 INJECTION, EMULSION INTRAVENOUS at 16:59

## 2020-01-31 RX ADMIN — SODIUM CHLORIDE, SODIUM LACTATE, POTASSIUM CHLORIDE, AND CALCIUM CHLORIDE 125 ML/HR: 600; 310; 30; 20 INJECTION, SOLUTION INTRAVENOUS at 18:00

## 2020-01-31 RX ADMIN — VANCOMYCIN HYDROCHLORIDE 1250 MG: 1.25 INJECTION, POWDER, LYOPHILIZED, FOR SOLUTION INTRAVENOUS at 02:22

## 2020-01-31 RX ADMIN — SODIUM CHLORIDE 100 ML/HR: 900 INJECTION, SOLUTION INTRAVENOUS at 11:16

## 2020-01-31 RX ADMIN — MIDAZOLAM 2 MG: 1 INJECTION INTRAMUSCULAR; INTRAVENOUS at 16:48

## 2020-01-31 RX ADMIN — ONDANSETRON HYDROCHLORIDE 4 MG: 2 INJECTION INTRAMUSCULAR; INTRAVENOUS at 17:01

## 2020-01-31 RX ADMIN — HYDROMORPHONE HYDROCHLORIDE 1 MG: 1 INJECTION, SOLUTION INTRAMUSCULAR; INTRAVENOUS; SUBCUTANEOUS at 01:40

## 2020-01-31 RX ADMIN — VANCOMYCIN HYDROCHLORIDE 1250 MG: 1.25 INJECTION, POWDER, LYOPHILIZED, FOR SOLUTION INTRAVENOUS at 15:41

## 2020-01-31 RX ADMIN — PANTOPRAZOLE SODIUM 40 MG: 40 TABLET, DELAYED RELEASE ORAL at 06:34

## 2020-01-31 RX ADMIN — PIPERACILLIN AND TAZOBACTAM 3.38 G: 3; .375 INJECTION, POWDER, LYOPHILIZED, FOR SOLUTION INTRAVENOUS at 21:07

## 2020-01-31 RX ADMIN — LIDOCAINE HYDROCHLORIDE 40 MG: 20 INJECTION, SOLUTION EPIDURAL; INFILTRATION; INTRACAUDAL; PERINEURAL at 16:58

## 2020-01-31 RX ADMIN — METOPROLOL TARTRATE 25 MG: 25 TABLET ORAL at 10:42

## 2020-01-31 RX ADMIN — PIPERACILLIN AND TAZOBACTAM 3.38 G: 3; .375 INJECTION, POWDER, LYOPHILIZED, FOR SOLUTION INTRAVENOUS at 01:39

## 2020-01-31 RX ADMIN — HYDROMORPHONE HYDROCHLORIDE 1 MG: 1 INJECTION, SOLUTION INTRAMUSCULAR; INTRAVENOUS; SUBCUTANEOUS at 10:40

## 2020-01-31 RX ADMIN — PROPOFOL 50 MCG/KG/MIN: 10 INJECTION, EMULSION INTRAVENOUS at 17:04

## 2020-01-31 RX ADMIN — Medication 10 ML: at 06:00

## 2020-01-31 RX ADMIN — ALLOPURINOL 100 MG: 100 TABLET ORAL at 10:42

## 2020-01-31 RX ADMIN — HYDROMORPHONE HYDROCHLORIDE 1 MG: 1 INJECTION, SOLUTION INTRAMUSCULAR; INTRAVENOUS; SUBCUTANEOUS at 19:09

## 2020-01-31 RX ADMIN — PIPERACILLIN AND TAZOBACTAM 3.38 G: 3; .375 INJECTION, POWDER, LYOPHILIZED, FOR SOLUTION INTRAVENOUS at 08:00

## 2020-01-31 RX ADMIN — HYDROMORPHONE HYDROCHLORIDE 1 MG: 1 INJECTION, SOLUTION INTRAMUSCULAR; INTRAVENOUS; SUBCUTANEOUS at 06:34

## 2020-01-31 RX ADMIN — ENOXAPARIN SODIUM 40 MG: 40 INJECTION, SOLUTION INTRAVENOUS; SUBCUTANEOUS at 19:09

## 2020-01-31 RX ADMIN — AMLODIPINE BESYLATE 10 MG: 5 TABLET ORAL at 10:43

## 2020-01-31 RX ADMIN — KETAMINE HYDROCHLORIDE 10 MG: 10 INJECTION, SOLUTION INTRAMUSCULAR; INTRAVENOUS at 16:57

## 2020-01-31 RX ADMIN — Medication 81 MG: at 11:17

## 2020-01-31 RX ADMIN — PROPOFOL 50 MG: 10 INJECTION, EMULSION INTRAVENOUS at 16:58

## 2020-01-31 RX ADMIN — KETAMINE HYDROCHLORIDE 10 MG: 10 INJECTION, SOLUTION INTRAMUSCULAR; INTRAVENOUS at 16:55

## 2020-01-31 RX ADMIN — HYDROMORPHONE HYDROCHLORIDE 1 MG: 1 INJECTION, SOLUTION INTRAMUSCULAR; INTRAVENOUS; SUBCUTANEOUS at 23:57

## 2020-01-31 RX ADMIN — SODIUM CHLORIDE, SODIUM LACTATE, POTASSIUM CHLORIDE, AND CALCIUM CHLORIDE 125 ML/HR: 600; 310; 30; 20 INJECTION, SOLUTION INTRAVENOUS at 15:15

## 2020-01-31 RX ADMIN — LISINOPRIL 5 MG: 5 TABLET ORAL at 10:41

## 2020-01-31 RX ADMIN — CALCIUM 500 MG: 500 TABLET ORAL at 10:40

## 2020-01-31 RX ADMIN — PIPERACILLIN AND TAZOBACTAM 3.38 G: 3; .375 INJECTION, POWDER, LYOPHILIZED, FOR SOLUTION INTRAVENOUS at 13:26

## 2020-01-31 NOTE — OP NOTES
[facility]  OPERATIVE REPORT    Name:  Luisa Nicole  MR#:   [id number]  :  1950  ACCOUNT #:  [de-identified]  DATE OF SERVICE:  2020    PREOPERATIVE DIAGNOSIS:  Left foot wound dehiscence. POSTOPERATIVE DIAGNOSIS:  Left foot wound dehiscence. PROCEDURES PERFORMED:  Abdominal aortogram; bilateral lower extremity arteriogram, fourth-order cannulation of left posterior tibial artery; orbital atherectomy, left posterior tibial artery; percutaneous transluminal angioplasty, left posterior tibial artery. SURGEON:  Jill Hamman, M.D.    ASSISTANT:  Dr. Fracisco Gastelum. ANESTHESIA:  Moderate sedation administered by Radha Orellana. The patient received a total of 3.5 mg of Versed and 225 mcg of fentanyl. COMPLICATIONS:  None. SPECIMENS REMOVED:  None. IMPLANTS:  exoseal right CFA    ESTIMATED BLOOD LOSS:  Minimal.    START TIME:  17:54. COMPLETION TIME:  19:50. The patient underwent continuous cardiovascular monitoring and I supervised the entire procedure. INDICATIONS:  The patient is a 42-year-old male who presents with a dehisced left dorsal foot wound following instrumentation for angiography. OPERATIVE FINDINGS:  The abdominal aorta and bilateral renal arteries were essentially normal without any flow-limiting stenosis. There is some calcification near the origin of the right common iliac artery without flow-limiting stenosis. Both common iliac arteries, external iliac arteries, and hypogastric arteries are widely patent without flow-limiting stenosis. On the left side, the common femoral artery is widely patent as is the profundus femoral artery and superficial femoral artery. There is a left total knee prosthesis, but the popliteal artery appears widely patent without flow-limiting stenosis. The anterior tibial artery is occluded in the mid proximal aspect of the leg. The posterior tibial artery is occluded in the mid proximal aspect of the leg.   The peroneal artery was patent proximally and then occludes. There is reconstitution of the posterior tibial artery in the distal lower leg. The peroneal artery is occluded without reconstitution in the left leg, and there were similar findings on the right. We successfully crossed the occluded posterior tibial artery, and there was no actual patency to the foot. The anterior tibial artery occludes and does not reconstitute at the anterior tibial artery or the dorsalis pedis artery in the left leg. PROCEDURE:  Having obtained prior consent, the patient was brought to the angiogram suite, was placed in a supine position. He was prepped and draped in a sterile fashion. Appropriate time-out with universal protocol was performed. Lidocaine 1% without epinephrine was used to anesthetize the skin and subcutaneous tissue overlying the right common femoral artery. This was documented to be patent with ultrasound and then with real-time ultrasound, it was cannulated with an 18-gauge needle visualizing the needle entering the artery. The micropuncture needle was used to cannulate the vessel and micropuncture dilator and sheath were passed over the micropuncture wire, which had been passed through the needle and then the 4-Japanese sheath was placed in the right groin. The Universal flush catheter was passed over the 4-Japanese sheath into the pararenal abdominal aorta and flush abdominal aortogram was obtained followed by a bolus lexx arteriogram of both lower extremities, see details above. We elected to intervene and used the Universal flush catheter, directed the Advantage Glidewire 0.035 into the contralateral common iliac artery, external iliac artery, common femoral artery, and then superficial femoral artery into the popliteal artery, and then we advanced the 5-Japanese sheath 90 cm over the bifurcation and into the common femoral artery on the left side.     We then, at the end of the dissection of the posterior tibial artery with the stiff angle Glidewire and the CXI catheter, engaged the posterior tibial artery with CXI catheter and then switched for an 0.018 Advantage Glidewire, and we were able to remain in true lumen and passed this through the posterior tibial artery into the true lumen of the distal posterior tibial artery where we constituted, confirming this angiographically. We exchanged for a ViperWire and then performed an orbital atherectomy in the posterior tibial artery with the 1.25 micro-crown. Followup angiogram still revealed severe stenosis, occlusion, and this was an angioplasty with a 2-mm balloon on the level of the foot, and a 2-mm balloon through the cath. Followup angiogram revealed a posterior stenosis and this was re-angioplastied with an excellent thin angiographic result. I then selected the anterior tibial artery and performed selective anterior arteriogram and there was no distal reconstitution of the anterior tibial artery or the dorsal pedis artery. There are collaterals off of the proximal anterior tibial artery, which do fill down into the foot and eventually, reached the great toe area, but there is no named vessel in the distribution of the anterior tibial artery or the dorsalis pedis artery, distal to the mid leg. No intervention was performed for this. Subsequently, then, the long 5-Yakut sheath was exchanged for a short 5-Yakut sheath in the groin and the Exoseal device was deployed with excellent hemostasis. The patient tolerated the procedure well.     Parris Guevara MD      [id number]  D:  01/30/2020 20:40  T:  01/31/2020 4:56  JOB #:  [id number]

## 2020-01-31 NOTE — CONSULTS
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 No f/s/c       Interval History:  CC:  I'm waiting for surgery  Events of last few days reviewed - R lower leg arteries opened up (EXCELLENT). Now awaiting repeat debridement from Dr Dwayne Liu. Feels good. Hungry. Waiting to go. PICC placed. Current Antimicrobials: Prior Antimicrobials   1. Vanco IV (1/27-) #4  2. Pip/tzb IV (1/27-) #4        Assessment Plan:   L Great Toe hardware infection  1/27:  CRP - 41mg/L    Now that the roadways are cleared/opened up, I can deliver abx to his foot. Remains to be seen what surgery will show. IF everything infected removed, he feasibly is done with the IV abx (go into tomorrow then stop); if there is residual infection/bone left, I would go 6wks from today. ->Vanco/Pip-tzb #4    Will reset IV 6wks from today/date of surgery - termination date being Carlo Polite) [id number] (I'm not going to stop on Friday the 13th). IF he is still here Monday, I'll see. O/W I'll chart stalk over the weekend.        PAD    RA    HTN      Microbiology:                1/27 - Wound (+) E coli (S-all) and CoNS        Lines / Catheters:         R PICC       Patient Active Problem List   Diagnosis Code    Enlarged prostate with lower urinary tract symptoms (LUTS) N40.1    Calculus of both kidneys N20.0    Rheumatoid arthritis ([address] M06.9    Essential hypertension I10    GERD (gastroesophageal reflux disease) K21.9    Disorder of bone and cartilage, unspecified M89.9, M94.9    Depression F32.9    Aortic stenosis I35.0    Gout M10.9    Impaired fasting blood sugar R73.01    Male erectile dysfunction, unspecified N52.9    Anxiety state F41.1    Chronic allergic rhinitis J30.9    History of aortic valve replacement Z95.2    Deviated nasal septum J34.2    Chronic maxillary sinusitis J32.0    Multiple-type hyperlipidemia E78.2    SHAILESH (obstructive sleep apnea) G47.33    Sensorineural hearing loss (SNHL) of both ears H90.3    Cellulitis L03.90    Cellulitis of foot, left L03. 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 7XXA       Current Facility-Administered Medications   Medication Dose Route Frequency    clopidogreL (PLAVIX) tablet 75 mg  75 mg Oral DAILY    0.9% sodium chloride infusion  100 mL/hr IntraVENous CONTINUOUS    HYDROmorphone (PF) (DILAUDID) injection 1 mg  1 mg IntraVENous Q4H PRN    collagenase (SANTYL) 250 unit/gram ointment   Topical DAILY    allopurinoL (ZYLOPRIM) tablet 100 mg  100 mg Oral DAILY    amLODIPine (NORVASC) tablet 10 mg  10 mg Oral DAILY    aspirin delayed-release tablet 81 mg  81 mg Oral DAILY    atorvastatin (LIPITOR) tablet 80 mg  80 mg Oral QHS    calcium carbonate (OS-CORBY) tablet 500 mg [elemental]  500 mg Oral BID WITH MEALS    cholecalciferol (VITAMIN D3) (400 Units /10 mcg) tablet 1 Tab  400 Units Oral DAILY    loratadine (CLARITIN) tablet 10 mg  10 mg Oral DAILY    lisinopril (PRINIVIL, ZESTRIL) tablet 5 mg  5 mg Oral DAILY    metoprolol tartrate (LOPRESSOR) tablet 25 mg  25 mg Oral DAILY    montelukast (SINGULAIR) tablet 10 mg  10 mg Oral DAILY    fish oil-omega-3 fatty acids 340-1,000 mg capsule 1 Cap  1 Cap Oral DAILY    pantoprazole (PROTONIX) tablet 40 mg  40 mg Oral ACB    sertraline (ZOLOFT) tablet 100 mg  100 mg Oral DAILY    tamsulosin (FLOMAX) capsule 0.4 mg  0.4 mg Oral DAILY    heparin (porcine) 100 unit/mL injection 500 Units  500 Units InterCATHeter Q8H PRN    sodium chloride (NS) flush 5-40 mL  5-40 mL IntraVENous Q8H    sodium chloride (NS) flush 5-40 mL  5-40 mL IntraVENous PRN    piperacillin-tazobactam (ZOSYN) 3.375 g in 0.9% sodium chloride (MBP/ADV) 100 mL MBP  3.375 g IntraVENous Q6H    oxyCODONE-acetaminophen (PERCOCET 7.5) 7.5-325 mg per tablet 1 Tab  1 Tab Oral Q4H PRN    enoxaparin (LOVENOX) injection 40 mg  40 mg SubCUTAneous Q24H    Vancomycin-pharmacy to consult  1 Each Other Rx Dosing/Monitoring    vancomycin (VANCOCIN) 1,250 mg in 0.9% sodium chloride 250 mL (VIAL-MATE)  1,250 mg IntraVENous Q12H         Review of Systems - General ROS: negative for - chills, fever or night sweats  Respiratory ROS: no cough, shortness of breath, or wheezing  Cardiovascular ROS: no chest pain or dyspnea on exertion  Gastrointestinal ROS: no abdominal pain, change in bowel habits, or black or bloody stools       Objective:  Visit Vitals  /65 (BP 1 Location: Right arm, BP Patient Position: At rest)   Pulse 68   Temp 98.7 °F (37.1 °C)   Resp 16   Ht 5' 9\" (1.753 m)   Wt 82.8 kg (182 lb 8 oz)   SpO2 91%   BMI 26.95 kg/m²       Temp (24hrs), Av.4 °F (36.9 °C), Min:97.6 °F (36.4 °C), Max:99 °F (37.2 °C)      GEN: WDWN WM in NAD  HEENT: anicteric  CHEST: CTA  CVS:RRR  R PICC:  Non-inflamed (bruised)  EXT: R foot wrapped       Lab results:    Chemistry  Recent Labs     20  0230 20  0110 20  0450   * 103* 98    140 135*   K 3.7 3.9 4.2    108 104   CO2 25 27 26   BUN 12 16 17   CREA 0.80 0.93 0.83   CA 8.0* 8.5 8.5   AGAP 7 5 5   BUCR 15 17 20       CBC w/ Diff  Recent Labs     20  0736 20  0110 20  0450   WBC 7.9 7.7 11.6   RBC 3.55* 3.37* 3.48*   HGB 10.4* 9.9* 10.2*   HCT 32.1* 30.8* 31.6*   * 647* 625*       Microbiology  All Micro Results     Procedure Component Value Units Date/Time    CULTURE, Leo Jayjay STAIN [[id number]]  (Abnormal)  (Susceptibility) Collected:  01/27/20 1559    Order Status:  Completed Specimen:  Wound Drainage Updated:  01/30/20 0902     Special Requests: NO SPECIAL REQUESTS        GRAM STAIN FEW WBC'S         RARE [address]ved[address] [facility] [address] POSITIVE RODS         RARE GRAM NEGATIVE RODS        Culture result: MODERATE ESCHERICHIA COLI         FEW DIPHTHEROIDS               FEW STAPHYLOCOCCUS SPECIES, COAGULASE NEGATIVE                 Reza Jackson MD  Cell [phone]  [facility]   1/31/2020   2:38 PM

## 2020-01-31 NOTE — PERIOP NOTES
Breakthrough Bleeding noted on Kerlix Dressing on left Foot. Dressing reinforced with ABDS. Surgeon made aware.

## 2020-01-31 NOTE — PROGRESS NOTES
Bedside and Verbal shift change report given to Darwin Olvera RN (oncoming nurse) by Anastasia Holcomb (offgoing nurse). Report included the following information SBAR, Kardex, Procedure Summary, Intake/Output, MAR and Recent Results. Assessment completed. Patient resting in bed with family at the bedside. 1515 [facility] Mike RN confirmed with MD that patient is to be given Plavix and Asprin. 1430- Patient transported to OR. 1830- Patient arrived back on unit via bed with Rn at this bedside. Dual skin assessment completed. Bedside and Verbal shift change report given to Tahira Sweet RN (oncoming nurse) by Darwin Olvera RN (offgoing nurse). Report included the following information SBAR, Kardex, OR Summary, Procedure Summary, Intake/Output, MAR and Recent Results.

## 2020-01-31 NOTE — PROGRESS NOTES
1930: Verbal report received from SHENG Aldana. Patient is currently off floor. 2114: Received patient from PACU, report given from Conner Weiner RN. Head to toe assessment completed at this time. Patient is A&OX4. Pt denies N/V, chest pain, SOB or distress. Pt is calmand cooperative. Capillary refill less than 3 seconds. Lungs are clear bilaterally. Bowel sounds are active. Patient voided 500ml. No tingling or numbness to lower extremities. Pain level is 5. Patient has transparent and     2138: Dilaudid 1mg IV given PRN. 0140: Dilaudid 1mg IV given PRN. 0230: Labs drawn from red hub. Both red and purple flushed with good blood return. 7465: Dilaudid 1mg IV given PRN.      6578: Labs taken again for CBC. Shift Summary: Patient reminded that after breakfast patient is to be NPO.

## 2020-01-31 NOTE — INTERVAL H&P NOTE
H&P Update:  Elijah John was seen and examined. History and physical has been reviewed. Significant clinical changes have occurred as noted: Increased perfusion to the left foot noted. Improved erythema and edema  Pt scheduled today for debridement and washout with potential for amputation.  Pt understand and agrees with treatment plan

## 2020-01-31 NOTE — PERIOP NOTES
Spoke with Karen Hernandez RN regarding patient's last dose of 600mg Plavix 1/30/20 at 2159 and 81mg of Aspirin. Karen Hernandez RN calling to confirm if these medications need held prior to surgery with information that the recent platelet level is elevated at 704. Dr. Jain Lay office called and message left with . Informed that Dr. Raisa Hogan is currently with a patient and will call back as soon as he can.

## 2020-01-31 NOTE — PROCEDURES
Brief Post-Op Note    Surgeon(s): David    Assistant(s): Catrina Blevins    Pre-operative Diagnosis: wound dehiscence left foot     Post-operative Diagnosis: same as preop diagnosis    Procedure(s) Performed:  Abdominal aortogram with bilateral lower extremity runoff, Left PTA atherectomy/pta    Anesthesia:  Local with 1% lidocaine and Moderate Sedation    Blood Adminstered:  none    Findings: occluded bilateral JERSON, PTA and peroneal arteries    Complications: none    Estimated Blood Loss:  minimal <100    Tubes and Drains: none    Implants/Grafts:  exoseal right CFA     Specimens: none

## 2020-01-31 NOTE — PERIOP NOTES
Primary Nurse Trudi Chavez and Hubert Gutierrez, RN performed a dual skin assessment on this patient No impairment noted    VS stable on transfer, accepting RN at the bedside.

## 2020-01-31 NOTE — PROGRESS NOTES
Pt underwent debridement and washout today following angioplasty by by Dr Garth Norris. I am cautiously optimistic toe can be salvaged but the next 2-3 days will be key indicators. I noticed good perfusion to the toe and good stability of the fusion site. I have recommended continued local wound care including wound vac if the patient can tolerate it.     Melany Nicholson DPM, Mehran Rang

## 2020-01-31 NOTE — ANESTHESIA POSTPROCEDURE EVALUATION
Post-Anesthesia Evaluation and Assessment    Cardiovascular Function/Vital Signs  Visit Vitals  /67   Pulse 77   Temp 36.6 °C (97.9 °F)   Resp 24   Ht 5' 9\" (1.753 m)   Wt 82.8 kg (182 lb 8 oz)   SpO2 95%   BMI 26.95 kg/m²       Patient is status post Procedure(s):  INCISION AND DRAINAGE LEFT FOOT WITH EXCISIONAL DEBRIDEMENT. Nausea/Vomiting: Controlled. Postoperative hydration reviewed and adequate. Pain:  Pain Scale 1: Numeric (0 - 10) (01/31/20 1740)  Pain Intensity 1: 0 (01/31/20 1740)   Managed. Neurological Status:   Neuro (WDL): Exceptions to WDL (01/31/20 1735)   At baseline. Mental Status and Level of Consciousness: Arousable. Pulmonary Status:   O2 Device: Nasal cannula (01/31/20 1733)   Adequate oxygenation and airway patent. Complications related to anesthesia: None    Post-anesthesia assessment completed. No concerns. Patient has met all discharge requirements.     Signed By: Twin Deluna MD    January 31, 2020

## 2020-01-31 NOTE — PERIOP NOTES
Bedside report to receiving nurse [facility], current vital signs noted below. Dressing dry and intact. Family in room 323 upon arrival. No further questions from receiving nurse. Dual skin assessment completed.     Visit Vitals  /75   Pulse 85   Temp 98.1 °F (36.7 °C)   Resp 16   Ht 5' 9\" (1.753 m)   Wt 82.8 kg (182 lb 8 oz)   SpO2 95%   BMI 26.95 kg/m²       David Crook RN

## 2020-01-31 NOTE — PROGRESS NOTES
TRANSFER - OUT REPORT:    Verbal report given to Lalo James RN (name) on [address]  being transferred to [facility](unit) for ordered procedure       Report consisted of patients Situation, Background, Assessment and   Recommendations(SBAR). Information from the following report(s) SBAR, Kardex, Intake/Output, MAR and Recent Results was reviewed with the receiving nurse. Lines:   PICC Double Lumen 15/78/12 Right;Basilic (Active)   Central Line Being Utilized Yes 1/30/2020  9:17 PM   Criteria for Appropriate Use Long term IV/antibiotic administration 1/30/2020  9:17 PM   Site Assessment Clean, dry, & intact 1/30/2020  9:17 PM   Phlebitis Assessment 0 1/30/2020  9:17 PM   Infiltration Assessment 0 1/30/2020  9:17 PM   Date of Last Dressing Change 01/28/20 1/30/2020  8:30 AM   Dressing Status Clean, dry, & intact 1/30/2020  9:17 PM   Action Taken Open ports on tubing capped 1/30/2020  9:17 PM   Dressing Type Disk with Chlorhexadine gluconate (CHG) 1/30/2020  8:30 AM   Hub Color/Line Status Purple; Infusing 1/30/2020  9:17 PM   Positive Blood Return (Site #1) Yes 1/30/2020  9:17 PM   Hub Color/Line Status Red;Capped 1/30/2020  9:17 PM   Positive Blood Return (Site #2) Yes 1/30/2020  9:17 PM   Alcohol Cap Used Yes 1/30/2020  9:17 PM       Peripheral IV 14/32/98 Right Cephalic (Active)   Site Assessment Clean, dry, & intact 1/30/2020  9:17 PM   Phlebitis Assessment 0 1/30/2020  9:17 PM   Infiltration Assessment 0 1/30/2020  9:17 PM   Dressing Status Clean, dry, & intact 1/30/2020  9:17 PM   Dressing Type Tape;Transparent 1/30/2020  9:17 PM   Hub Color/Line Status Capped 1/30/2020  9:17 PM   Action Taken Open ports on tubing capped 1/30/2020  9:17 PM   Alcohol Cap Used Yes 1/30/2020  9:17 PM        Opportunity for questions and clarification was provided.       Patient transported with:   [email or web address]

## 2020-01-31 NOTE — BRIEF OP NOTE
BRIEF OPERATIVE NOTE    Date of Procedure: 1/31/2020   Preoperative Diagnosis: infected left foot  Postoperative Diagnosis: * No post-op diagnosis entered *    Procedure(s):  INCISION AND DRAINAGE LEFT FOOT WITH EXCISIONAL DEBRIDEMENT  Surgeon(s) and Role:     * Param Ken DPM - Primary         Surgical Assistant: None    Surgical Staff:  Circ-1: Gabby Robledo  Event Time In Time Out   Incision Start     Incision Close 01/31/2020 1712      Anesthesia: MAC   Estimated Blood Loss: None  Specimens: * No specimens in log *   Findings: Good perfusion to toe, fusion site intact, no significant necrosis   Complications: None  Implants: * No implants in log *

## 2020-01-31 NOTE — PERIOP NOTES
Spoke with Dr. Kita Rodas and notified him that patient's Aspirin and Plavix is due, also notified him that patient's platelet level was elevated. Dr. Kita Rodas states that patient can have scheduled Asirin and Plavix. Maurice Desir RN on 3rd floor was notified of this and confirmed administration.

## 2020-01-31 NOTE — ROUTINE PROCESS
Bedside shift change report given to Craig Eli RN (oncoming nurse) by Lisseth Balderas (offgoing nurse). Report included the following information SBAR, Kardex and MAR. Excision Depth: adipose tissue

## 2020-01-31 NOTE — PROGRESS NOTES
Hospitalist Progress Note    Patient: Cricket Lopez MRN: [id number]  CSN: [id number]    YOB: 1950  Age: 77 y.o. Sex: male    DOA: 1/27/2020 LOS:  LOS: 4 days                Assessment/Plan     Patient Active Problem List   Diagnosis Code    Enlarged prostate with lower urinary tract symptoms (LUTS) N40.1    Calculus of both kidneys N20.0    Rheumatoid arthritis ([facility] M06.9    Essential hypertension I10    GERD (gastroesophageal reflux disease) K21.9    Disorder of bone and cartilage, unspecified M89.9, M94.9    Depression F32.9    Aortic stenosis I35.0    Gout M10.9    Impaired fasting blood sugar R73.01    Male erectile dysfunction, unspecified N52.9    Anxiety state F41.1    Chronic allergic rhinitis J30.9    History of aortic valve replacement Z95.2    Deviated nasal septum J34.2    Chronic maxillary sinusitis J32.0    Multiple-type hyperlipidemia E78.2    SHAILESH (obstructive sleep apnea) G47.33    Sensorineural hearing loss (SNHL) of both ears H90.3    Cellulitis L03.90    Cellulitis of foot, left L03. 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 7XXA            Patient admitted by podiatry service,     Left foot cellulitis/wound dehiscence/exposed hardware-  Started on vancomycin and Zosyn, follow wound cultures. Seen by plastic surgery, ID, vascular  S/p Abdominal aortogram; bilateral lower extremity arteriogram, fourth-order cannulation of left posterior tibial artery; orbital atherectomy, left posterior tibial artery; percutaneous transluminal angioplasty, left posterior tibial artery. On 01/30/2020.     S/p I&D left foot with excisional debridement.     Hypertension-  Continue with home medications monitor blood pressure.     History of rheumatoid arthritis-  He is on Enbrel at home which was stopped because of wound healing. We will continue to hold Enbrel at this point.     History of aortic valve replacement-  Bovine not on any anticoagulation.     GERD-  Continue with PPI     Gout-  Continue with allopurinol.     BPH-  Continue with Flomax     DVT prophylaxis with Lovenox    Disposition : per primary    Review of systems  General: No fevers or chills. Cardiovascular: No chest pain or pressure. No palpitations. Pulmonary: No shortness of breath. Gastrointestinal: No nausea, vomiting. Physical Exam:  General: Awake, cooperative, no acute distress    HEENT: NC, Atraumatic. PERRLA, anicteric sclerae. Lungs: CTA Bilaterally. No Wheezing/Rhonchi/Rales. Heart:  S1 S2,  No murmur, No Rubs, No Gallops  Abdomen: Soft, Non distended, Non tender.  +Bowel sounds,   Extremities: Left foot great toe with open wound and exposed hardware. Psych:   Not anxious or agitated. Neurologic:  No acute neurological deficit. Vital signs/Intake and Output:  Visit Vitals  /75   Pulse 85   Temp 98.1 °F (36.7 °C)   Resp 16   Ht 5' 9\" (1.753 m)   Wt 82.8 kg (182 lb 8 oz)   SpO2 95%   BMI 26.95 kg/m²     Current Shift:  01/31 0701 - 01/31 1900  In: 600 [I.V.:600]  Out: 8301 [Urine:1125]  Last three shifts:  01/29 1901 - 01/31 0700  In: 120 [P.O.:120]  Out: 1500 [Urine:1500]            Labs: Results:       Chemistry Recent Labs     01/31/20  0230 01/30/20  0110 01/29/20  0450   * 103* 98    140 135*   K 3.7 3.9 4.2    108 104   CO2 25 27 26   BUN 12 16 17   CREA 0.80 0.93 0.83   CA 8.0* 8.5 8.5   AGAP 7 5 5   BUCR 15 17 20      CBC w/Diff Recent Labs     01/31/20  0736 01/30/20  0110 01/29/20  0450   WBC 7.9 7.7 11.6   RBC 3.55* 3.37* 3.48*   HGB 10.4* 9.9* 10.2*   HCT 32.1* 30.8* 31.6*   * 647* 625*      Cardiac Enzymes No results for input(s): CPK, CKND1, JESSI in the last 72 hours.     No lab exists for component: CKRMB, TROIP   Coagulation No results for input(s): PTP, INR, APTT, INREXT, INREXT in the last 72 hours. Lipid Panel No results found for: CHOL, CHOLPOCT, CHOLX, CHLST, CHOLV, 723037, HDL, HDLP, LDL, LDLC, DLDLP, 495132, VLDLC, VLDL, TGLX, TRIGL, TRIGP, TGLPOCT, CHHD, CHHDX   BNP No results for input(s): BNPP in the last 72 hours. Liver Enzymes No results for input(s): TP, ALB, TBIL, AP, SGOT, GPT in the last 72 hours.     No lab exists for component: DBIL   Thyroid Studies No results found for: T4, T3U, TSH, TSHEXT, TSHEXT     Procedures/imaging: see electronic medical records for all procedures/Xrays and details which were not copied into this note but were reviewed prior to creation of Plan

## 2020-01-31 NOTE — PERIOP NOTES
TRANSFER - OUT REPORT:    Verbal report given to [address] [place] [address] on [employer]  being transferred to [facility] for routine progression of care       Report consisted of patients Situation, Background, Assessment and   Recommendations(SBAR). Information from the following report(s) SBAR was reviewed with the receiving nurse. Lines:   PICC Double Lumen 42/49/73 Right;Basilic (Active)   Central Line Being Utilized No 1/31/2020  5:35 PM   Criteria for Appropriate Use Long term IV/antibiotic administration 1/31/2020  3:12 PM   Site Assessment Clean, dry, & intact 1/31/2020  3:12 PM   Phlebitis Assessment 0 1/31/2020  3:12 PM   Infiltration Assessment 0 1/31/2020  3:12 PM   Date of Last Dressing Change 01/28/20 1/30/2020  8:30 AM   Dressing Status Clean, dry, & intact 1/31/2020  3:12 PM   Action Taken Open ports on tubing capped 1/31/2020  3:12 PM   Dressing Type Disk with Chlorhexadine gluconate (CHG) 1/31/2020  3:12 PM   Hub Color/Line Status Purple 1/31/2020  3:12 PM   Positive Blood Return (Site #1) Yes 1/31/2020  3:12 PM   Hub Color/Line Status Red;Capped 1/31/2020  3:12 PM   Positive Blood Return (Site #2) Yes 1/31/2020  3:12 PM   Alcohol Cap Used Yes 1/30/2020  9:17 PM       Peripheral IV 39/73/53 Right Cephalic (Active)   Site Assessment Clean, dry, & intact 1/31/2020  5:35 PM   Phlebitis Assessment 0 1/31/2020  5:35 PM   Infiltration Assessment 0 1/31/2020  5:35 PM   Dressing Status Clean, dry, & intact 1/31/2020  5:35 PM   Dressing Type Transparent;Tape 1/31/2020  5:35 PM   Hub Color/Line Status Green;Patent; Infusing 1/31/2020  5:35 PM   Action Taken Open ports on tubing capped 1/30/2020  9:17 PM   Alcohol Cap Used Yes 1/30/2020  9:17 PM      Visit Vitals  /65   Pulse 73   Temp 97.9 °F (36.6 °C)   Resp 18   Ht 5' 9\" (1.753 m)   Wt 82.8 kg (182 lb 8 oz)   SpO2 96%   BMI 26.95 kg/m²         Intake/Output Summary (Last 24 hours) at 1/31/2020 1804  Last data filed at 1/31/2020 1750  Gross per 24 hour Intake 600 ml   Output 1625 ml   Net -1025 ml         Opportunity for questions and clarification was provided.       Patient transported with:   O2 @ 2 liters  Registered Nurse  Padmaja Alva RN

## 2020-01-31 NOTE — PERIOP NOTES
TRANSFER - OUT REPORT:    Verbal report given to Liseth(name) on [employer]  being transferred to [facility](unit) for routine post - op       Report consisted of patients Situation, Background, Assessment and   Recommendations(SBAR). Information from the following report(s) SBAR, Kardex, OR Summary and MAR was reviewed with the receiving nurse. Lines:   PICC Double Lumen 17/31/53 Right;Basilic (Active)   Central Line Being Utilized Yes 1/30/2020  8:47 PM   Criteria for Appropriate Use Long term IV/antibiotic administration 1/30/2020  8:47 PM   Site Assessment Clean, dry, & intact 1/30/2020  8:47 PM   Phlebitis Assessment 0 1/30/2020  8:47 PM   Infiltration Assessment 0 1/30/2020  8:47 PM   Date of Last Dressing Change 01/28/20 1/30/2020  8:30 AM   Dressing Status Clean, dry, & intact 1/30/2020  8:47 PM   Dressing Type Disk with Chlorhexadine gluconate (CHG) 1/30/2020  8:30 AM   Hub Color/Line Status Purple; Infusing 1/30/2020  8:47 PM   Positive Blood Return (Site #1) Yes 1/30/2020  8:30 AM   Hub Color/Line Status Red 1/30/2020  8:30 AM   Positive Blood Return (Site #2) Yes 1/30/2020  8:30 AM   Alcohol Cap Used Yes 1/30/2020  8:30 AM       Peripheral IV 32/92/40 Right Cephalic (Active)   Site Assessment Clean, dry, & intact 1/30/2020  8:16 PM   Phlebitis Assessment 0 1/30/2020  8:16 PM   Infiltration Assessment 0 1/30/2020  8:16 PM   Dressing Status Clean, dry, & intact 1/30/2020  8:16 PM   Dressing Type Transparent;Tape 1/30/2020  8:16 PM   Hub Color/Line Status Green;Capped 1/30/2020  8:16 PM   Action Taken Other (comment) 1/30/2020  8:30 AM   Alcohol Cap Used Yes 1/30/2020  8:30 AM        Opportunity for questions and clarification was provided.       Patient transported with:   Registered Nurse

## 2020-01-31 NOTE — ANESTHESIA PREPROCEDURE EVALUATION
Relevant Problems   No relevant active problems       Anesthetic History        Pertinent negatives: No PONV       Review of Systems / Medical History  Patient summary reviewed, nursing notes reviewed and pertinent labs reviewed    Pulmonary        Sleep apnea: CPAP      Pertinent negatives: No asthma and smoker     Neuro/Psych         TIA (mainly balance problems, resolved without residual deficits) and psychiatric history (h/o anxiety/depression)     Cardiovascular    Hypertension  Valvular problems/murmurs (s/p aortic valve replacement 2.5 years ago, recent echo shows valve well seated): aortic stenosis          Pertinent negatives: No past MI and dysrhythmiasPacemaker: took beta blocker today. Exercise tolerance: >4 METS     GI/Hepatic/Renal     GERD: well controlled    Renal disease: stones    Pertinent negatives: No liver disease   Endo/Other        Arthritis  Pertinent negatives: No diabetes   Other Findings   Comments: BPH           Physical Exam    Airway  Mallampati: II  TM Distance: 4 - 6 cm  Neck ROM: normal range of motion   Mouth opening: Normal     Cardiovascular      Rate: normal         Dental  No notable dental hx       Pulmonary  Breath sounds clear to auscultation               Abdominal  GI exam deferred       Other Findings            Anesthetic Plan    ASA: 2  Anesthesia type: MAC            Anesthetic plan and risks discussed with: Patient      Straight local vs. Local/MAC. Pt ate at 8:30 am (full breakfast) and will have to wait 8 hours according to ASA guidelines. Pt states he would like to wait the 8 hours and receive MAC anesthesia.

## 2020-01-31 NOTE — PERIOP NOTES
TRANSFER - IN REPORT:    Verbal report received from Mgidalia Interiano, [facility] on [employer]  being received from [place] for ordered procedure      Report consisted of patients Situation, Background, Assessment and   Recommendations(SBAR). Information from the following report(s) SBAR, MAR and Recent Results was reviewed with the receiving nurse. Opportunity for questions and clarification was provided. Assessment completed upon patients arrival to unit and care assumed. Patient also had a full breakfast this morning, (eggs, walden, toast). Dr. Keegan Guerin [facility] informed and advised that patient's surgery would have to be moved to 1630.

## 2020-01-31 NOTE — PROGRESS NOTES
Noted pt with planned surgical intervention today with podiatry. Please order therapy services when appropriate following surgical intervention to assist with identifying potential transition of care needs. Anticipate pt will require approximately 6 weeks of IVABX (ertipeniem) daily. CM to continue to follow and assist.    Care Management Interventions  PCP Verified by CM:  Yes  Transition of Care Consult (CM Consult): Discharge Planning  Health Maintenance Reviewed: Yes  Current Support Network: Lives with Spouse  Confirm Follow Up Transport: Family

## 2020-01-31 NOTE — PROGRESS NOTES
VASCULAR AND TRANSPLANT       PROGRESS NOTE    Date: 2020    Post-Op Day: 1. S/p aortogram with left leg angiogram with atherectomy/pta of left posterior tibial artery. Plan: Will check follow up duplex. for surgery this afternoon by Dr. Emy Gallagher. 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 Subjective:   Pt without complaints this am.    No pain right groin, no pain left foot. Objective:   Admit weight: Weight: 80.3 kg (177 lb)  Last recorded weight: Weight: 82.8 kg (182 lb 8 oz)    Temp (24hrs), Av.3 °F (36.8 °C), Min:97.6 °F (36.4 °C), Max:99 °F (37.2 °C)          [unfilled]    Intake/Output Summary (Last 24 hours) at 2020 1009  Last data filed at 2020 1694  Gross per 24 hour   Intake --   Output 1150 ml   Net -1150 ml       Right groin soft, non-tender, no hematoma. Left foot warm, dressing in place. I believe I appreciate a posterior tibial pulse. Labs:     BMP:   Recent Labs     20  0230 20  0110 20  0450   BUN 12 16 17    140 135*   CO2 25 27 26     CBC:    Recent Labs     20  0736 20  0110 20  0450   WBC 7.9 7.7 11.6   HCT 32.1* 30.8* 31.6*   RDW 15.8* 16.2* 16.1*     PT/INR: No results for input(s): INR, INREXT in the last 72 hours.     No lab exists for component: PT    Arterial Blood Gases   No results found for: FIO2, PEEP, PH1, PCO2, PO2, HCO3, MODE     MICRO:  Results     Procedure Component Value Units Date/Time    CULTURE, Damaso Ward STAIN [[id number]]  (Abnormal)  (Susceptibility) Collected:  20 1559    Order Status:  Completed Specimen:  Wound Drainage Updated:  20 09     Special Requests: NO SPECIAL REQUESTS        GRAM STAIN FEW WBC'S         RARE Siri Pisek POSITIVE RODS         RARE GRAM NEGATIVE RODS        Culture result: MODERATE ESCHERICHIA COLI         FEW DIPHTHEROIDS               FEW STAPHYLOCOCCUS SPECIES, COAGULASE NEGATIVE          Susceptibility      Escherichia coli     ANTONIETTA     Ampicillin ($) Susceptible     Ampicillin/sulbactam ($) Susceptible     Cefazolin ($) Susceptible     Cefepime ($$) Susceptible     Ceftazidime ($) Susceptible     Ceftriaxone ($) Susceptible     Ciprofloxacin ($) Susceptible     Gentamicin ($) Susceptible     Imipenem Susceptible     Levofloxacin ($) Susceptible     Piperacillin/Tazobac ($) Susceptible     Tobramycin ($) Susceptible     Trimeth-Sulfamethoxa Susceptible                              PVL: pending. Yu Ann PA-C   [phone]. Duplex results noted--triphasic left post tib waveform. Will sign off and f/u in office in one month.     [address] [facility]  [facility] Asst Prof Surgery  PG [phone]  Cell [phone]

## 2020-01-31 NOTE — PROGRESS NOTES
Problem: Falls - Risk of  Goal: *Absence of Falls  Description  Document Edgar Brunner Fall Risk and appropriate interventions in the flowsheet.   Outcome: Progressing Towards Goal  Note: Fall Risk Interventions:            Medication Interventions: Patient to call before getting OOB, Teach patient to arise slowly    Elimination Interventions: Call light in reach

## 2020-02-01 LAB
ANION GAP SERPL CALC-SCNC: 3 MMOL/L (ref 3–18)
BUN SERPL-MCNC: 9 MG/DL (ref 7–18)
BUN/CREAT SERPL: 10 (ref 12–20)
CALCIUM SERPL-MCNC: 8.2 MG/DL (ref 8.5–10.1)
CHLORIDE SERPL-SCNC: 110 MMOL/L (ref 100–111)
CO2 SERPL-SCNC: 27 MMOL/L (ref 21–32)
CREAT SERPL-MCNC: 0.9 MG/DL (ref 0.6–1.3)
ERYTHROCYTE [DISTWIDTH] IN BLOOD BY AUTOMATED COUNT: 15.8 % (ref 11.6–14.5)
GLUCOSE SERPL-MCNC: 86 MG/DL (ref 74–99)
HCT VFR BLD AUTO: 30.8 % (ref 36–48)
HGB BLD-MCNC: 10 G/DL (ref 13–16)
MCH RBC QN AUTO: 29.5 PG (ref 24–34)
MCHC RBC AUTO-ENTMCNC: 32.5 G/DL (ref 31–37)
MCV RBC AUTO: 90.9 FL (ref 74–97)
PLATELET # BLD AUTO: 686 K/UL (ref 135–420)
PMV BLD AUTO: 9.1 FL (ref 9.2–11.8)
POTASSIUM SERPL-SCNC: 3.7 MMOL/L (ref 3.5–5.5)
RBC # BLD AUTO: 3.39 M/UL (ref 4.7–5.5)
SODIUM SERPL-SCNC: 140 MMOL/L (ref 136–145)
WBC # BLD AUTO: 7.1 K/UL (ref 4.6–13.2)

## 2020-02-01 PROCEDURE — 85027 COMPLETE CBC AUTOMATED: CPT

## 2020-02-01 PROCEDURE — 74011250637 HC RX REV CODE- 250/637: Performed by: HOSPITALIST

## 2020-02-01 PROCEDURE — 74011250637 HC RX REV CODE- 250/637: Performed by: SURGERY

## 2020-02-01 PROCEDURE — 77010033678 HC OXYGEN DAILY

## 2020-02-01 PROCEDURE — 77030019934 HC DRSG VAC ASST KCON -B

## 2020-02-01 PROCEDURE — 74011250637 HC RX REV CODE- 250/637: Performed by: PODIATRIST

## 2020-02-01 PROCEDURE — 77030019952 HC CANSTR VAC ASST KCON -B

## 2020-02-01 PROCEDURE — 74011250636 HC RX REV CODE- 250/636: Performed by: ANESTHESIOLOGY

## 2020-02-01 PROCEDURE — 97605 NEG PRS WND THER DME<=50SQCM: CPT

## 2020-02-01 PROCEDURE — 74011250636 HC RX REV CODE- 250/636: Performed by: HOSPITALIST

## 2020-02-01 PROCEDURE — 74011000258 HC RX REV CODE- 258: Performed by: PODIATRIST

## 2020-02-01 PROCEDURE — 80048 BASIC METABOLIC PNL TOTAL CA: CPT

## 2020-02-01 PROCEDURE — 65270000029 HC RM PRIVATE

## 2020-02-01 PROCEDURE — 74011250636 HC RX REV CODE- 250/636: Performed by: PODIATRIST

## 2020-02-01 RX ADMIN — SODIUM CHLORIDE, SODIUM LACTATE, POTASSIUM CHLORIDE, AND CALCIUM CHLORIDE 125 ML/HR: 600; 310; 30; 20 INJECTION, SOLUTION INTRAVENOUS at 20:42

## 2020-02-01 RX ADMIN — TAMSULOSIN HYDROCHLORIDE 0.4 MG: 0.4 CAPSULE ORAL at 08:40

## 2020-02-01 RX ADMIN — PIPERACILLIN AND TAZOBACTAM 3.38 G: 3; .375 INJECTION, POWDER, LYOPHILIZED, FOR SOLUTION INTRAVENOUS at 08:41

## 2020-02-01 RX ADMIN — PIPERACILLIN AND TAZOBACTAM 3.38 G: 3; .375 INJECTION, POWDER, LYOPHILIZED, FOR SOLUTION INTRAVENOUS at 02:31

## 2020-02-01 RX ADMIN — VANCOMYCIN HYDROCHLORIDE 1250 MG: 1.25 INJECTION, POWDER, LYOPHILIZED, FOR SOLUTION INTRAVENOUS at 03:11

## 2020-02-01 RX ADMIN — LISINOPRIL 5 MG: 5 TABLET ORAL at 08:40

## 2020-02-01 RX ADMIN — Medication 20 ML: at 21:30

## 2020-02-01 RX ADMIN — OXYCODONE HYDROCHLORIDE AND ACETAMINOPHEN 1 TABLET: 7.5; 325 TABLET ORAL at 02:38

## 2020-02-01 RX ADMIN — CLOPIDOGREL BISULFATE 75 MG: 75 TABLET ORAL at 08:39

## 2020-02-01 RX ADMIN — HYDROMORPHONE HYDROCHLORIDE 1 MG: 1 INJECTION, SOLUTION INTRAMUSCULAR; INTRAVENOUS; SUBCUTANEOUS at 06:58

## 2020-02-01 RX ADMIN — SODIUM CHLORIDE, SODIUM LACTATE, POTASSIUM CHLORIDE, AND CALCIUM CHLORIDE 125 ML/HR: 600; 310; 30; 20 INJECTION, SOLUTION INTRAVENOUS at 08:45

## 2020-02-01 RX ADMIN — MONTELUKAST 10 MG: 10 TABLET, FILM COATED ORAL at 08:40

## 2020-02-01 RX ADMIN — COLLAGENASE SANTYL: 250 OINTMENT TOPICAL at 11:56

## 2020-02-01 RX ADMIN — SERTRALINE HYDROCHLORIDE 100 MG: 50 TABLET ORAL at 08:39

## 2020-02-01 RX ADMIN — Medication 81 MG: at 08:39

## 2020-02-01 RX ADMIN — CHOLECALCIFEROL TAB 10 MCG (400 UNIT) 1 TABLET: 10 TAB at 08:39

## 2020-02-01 RX ADMIN — PIPERACILLIN AND TAZOBACTAM 3.38 G: 3; .375 INJECTION, POWDER, LYOPHILIZED, FOR SOLUTION INTRAVENOUS at 20:44

## 2020-02-01 RX ADMIN — CALCIUM 500 MG: 500 TABLET ORAL at 08:40

## 2020-02-01 RX ADMIN — LORATADINE 10 MG: 10 TABLET ORAL at 08:40

## 2020-02-01 RX ADMIN — Medication 10 ML: at 14:32

## 2020-02-01 RX ADMIN — ATORVASTATIN CALCIUM 80 MG: 20 TABLET, FILM COATED ORAL at 21:30

## 2020-02-01 RX ADMIN — OXYCODONE HYDROCHLORIDE AND ACETAMINOPHEN 1 TABLET: 7.5; 325 TABLET ORAL at 19:54

## 2020-02-01 RX ADMIN — VANCOMYCIN HYDROCHLORIDE 1250 MG: 1.25 INJECTION, POWDER, LYOPHILIZED, FOR SOLUTION INTRAVENOUS at 16:07

## 2020-02-01 RX ADMIN — ALLOPURINOL 100 MG: 100 TABLET ORAL at 08:40

## 2020-02-01 RX ADMIN — PIPERACILLIN AND TAZOBACTAM 3.38 G: 3; .375 INJECTION, POWDER, LYOPHILIZED, FOR SOLUTION INTRAVENOUS at 14:32

## 2020-02-01 RX ADMIN — PANTOPRAZOLE SODIUM 40 MG: 40 TABLET, DELAYED RELEASE ORAL at 08:39

## 2020-02-01 RX ADMIN — ENOXAPARIN SODIUM 40 MG: 40 INJECTION, SOLUTION INTRAVENOUS; SUBCUTANEOUS at 18:46

## 2020-02-01 RX ADMIN — METOPROLOL TARTRATE 25 MG: 25 TABLET ORAL at 08:40

## 2020-02-01 RX ADMIN — HYDROMORPHONE HYDROCHLORIDE 1 MG: 1 INJECTION, SOLUTION INTRAMUSCULAR; INTRAVENOUS; SUBCUTANEOUS at 16:41

## 2020-02-01 RX ADMIN — CALCIUM 500 MG: 500 TABLET ORAL at 16:42

## 2020-02-01 RX ADMIN — AMLODIPINE BESYLATE 10 MG: 5 TABLET ORAL at 08:40

## 2020-02-01 RX ADMIN — Medication 1 CAPSULE: at 08:39

## 2020-02-01 RX ADMIN — OXYCODONE HYDROCHLORIDE AND ACETAMINOPHEN 1 TABLET: 7.5; 325 TABLET ORAL at 11:55

## 2020-02-01 NOTE — PROGRESS NOTES
Hospitalist Progress Note    Patient: Robert Daly MRN: [id number]  CSN: [id number]    YOB: 1950  Age: 77 y.o. Sex: male    DOA: 1/27/2020 LOS:  LOS: 5 days            Assessment/Plan   1. Left foot cellultisis w wound dehischence & exposed hardware sp I&D  2. HTN controlled  3. RA - embrel being held  4. BPH    Plan:  - cont abx  - cont home antihypertensives  - woudn management per podiatry        Patient Active Problem List   Diagnosis Code    Enlarged prostate with lower urinary tract symptoms (LUTS) N40.1    Calculus of both kidneys N20.0    Rheumatoid arthritis ([facility] M06.9    Essential hypertension I10    GERD (gastroesophageal reflux disease) K21.9    Disorder of bone and cartilage, unspecified M89.9, M94.9    Depression F32.9    Aortic stenosis I35.0    Gout M10.9    Impaired fasting blood sugar R73.01    Male erectile dysfunction, unspecified N52.9    Anxiety state F41.1    Chronic allergic rhinitis J30.9    History of aortic valve replacement Z95.2    Deviated nasal septum J34.2    Chronic maxillary sinusitis J32.0    Multiple-type hyperlipidemia E78.2    SHAILESH (obstructive sleep apnea) G47.33    Sensorineural hearing loss (SNHL) of both ears H90.3    Cellulitis L03.90    Cellulitis of foot, left L03. 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 7XXA               Subjective:    cc: \" I feel fine\"  Pt complains of pain in operative foot, rates 3/10      REVIEW OF SYSTEMS:  General: No fevers or chills. Cardiovascular: No chest pain or pressure. No palpitations. Pulmonary: No shortness of breath. Gastrointestinal: No nausea, vomiting.      Objective:        Vital signs/Intake and Output:  Visit Vitals  /52   Pulse 65   Temp 98 °F (36.7 °C)   Resp 16   Ht 5' 9\" (1.753 m)   Wt 82.8 kg (182 lb 8 oz)   SpO2 94%   BMI 26.95 kg/m²     Current Shift:  02/01 0701 - 02/01 1900  In: 5501 [P.O.:240; I.V.:773]  Out: -   Last three shifts:  01/30 1901 - 02/01 0700  In: 1356.3 [I.V.:1356.3]  Out: 2875 [Urine:2875]    Body mass index is 26.95 kg/m².     Physical Exam:  GEN: Alert and oriented times three NAD  EYES: conjunctiva normal, lids with out lesions  HEENT: MMM, No thyromegaly, no lymphadenopathy  HEART: RRR +S1 +S2, no JVD, pulses 2+ distally  LUNGS: CTA B/L no wheezes, rales or rhonchi  ABDOMEN: + BS, soft NT/ND no organomegaly,  No rebound  EXTREMITIES: foot wrapped, cdi, No edema cyanosis, cap refill normal   SKIN: no rashes or skin breakdown, no nodules, normal turgor  Current Facility-Administered Medications   Medication Dose Route Frequency    lactated Ringers infusion  125 mL/hr IntraVENous CONTINUOUS    clopidogreL (PLAVIX) tablet 75 mg  75 mg Oral DAILY    HYDROmorphone (PF) (DILAUDID) injection 1 mg  1 mg IntraVENous Q4H PRN    collagenase (SANTYL) 250 unit/gram ointment   Topical DAILY    allopurinoL (ZYLOPRIM) tablet 100 mg  100 mg Oral DAILY    amLODIPine (NORVASC) tablet 10 mg  10 mg Oral DAILY    aspirin delayed-release tablet 81 mg  81 mg Oral DAILY    atorvastatin (LIPITOR) tablet 80 mg  80 mg Oral QHS    calcium carbonate (OS-CORBY) tablet 500 mg [elemental]  500 mg Oral BID WITH MEALS    cholecalciferol (VITAMIN D3) (400 Units /10 mcg) tablet 1 Tab  400 Units Oral DAILY    loratadine (CLARITIN) tablet 10 mg  10 mg Oral DAILY    lisinopril (PRINIVIL, ZESTRIL) tablet 5 mg  5 mg Oral DAILY    metoprolol tartrate (LOPRESSOR) tablet 25 mg  25 mg Oral DAILY    montelukast (SINGULAIR) tablet 10 mg  10 mg Oral DAILY    fish oil-omega-3 fatty acids 340-1,000 mg capsule 1 Cap  1 Cap Oral DAILY    pantoprazole (PROTONIX) tablet 40 mg  40 mg Oral ACB    sertraline (ZOLOFT) tablet 100 mg  100 mg Oral DAILY    tamsulosin (FLOMAX) capsule 0.4 mg  0.4 mg Oral DAILY    heparin (porcine) 100 unit/mL injection 500 Units  500 Units InterCATHeter Q8H PRN    sodium chloride (NS) flush 5-40 mL  5-40 mL IntraVENous Q8H    sodium chloride (NS) flush 5-40 mL  5-40 mL IntraVENous PRN    piperacillin-tazobactam (ZOSYN) 3.375 g in 0.9% sodium chloride (MBP/ADV) 100 mL MBP  3.375 g IntraVENous Q6H    oxyCODONE-acetaminophen (PERCOCET 7.5) 7.5-325 mg per tablet 1 Tab  1 Tab Oral Q4H PRN    enoxaparin (LOVENOX) injection 40 mg  40 mg SubCUTAneous Q24H    Vancomycin-pharmacy to consult  1 Each Other Rx Dosing/Monitoring    vancomycin (VANCOCIN) 1,250 mg in 0.9% sodium chloride 250 mL (VIAL-MATE)  1,250 mg IntraVENous Q12H         All the patient's labs over the past 24 hours were reviewed both during my initial daily workflow process and at the time notated as \"note time\" in [employer]. (It is not time stamped separately in this workflow.)  Select labs are listed below.         Labs: Results:       Chemistry Recent Labs     02/01/20  0750 01/31/20  0230 01/30/20  0110   GLU 86 139* 103*    139 140   K 3.7 3.7 3.9    107 108   CO2 27 25 27   BUN 9 12 16   CREA 0.90 0.80 0.93   CA 8.2* 8.0* 8.5   AGAP 3 7 5   BUCR 10* 15 17      CBC w/Diff Recent Labs     02/01/20  0750 01/31/20  0736 01/30/20  0110   WBC 7.1 7.9 7.7   RBC 3.39* 3.55* 3.37*   HGB 10.0* 10.4* 9.9*   HCT 30.8* 32.1* 30.8*   * 704* 647*                              Procedures/imaging: see electronic medical records for all procedures/Xrays and details which were not copied into this note but were reviewed prior to creation of [address] [place] [address] Rd, DO  Internal Medicine/Geriatrics

## 2020-02-01 NOTE — PROGRESS NOTES
0017-Resting in bed with wife at bedside in reclining chair. Stable. White board updated. Call light and personal items within reach. 0047-Stable. Assessment complete. Positive dorsalis pedis pulse with doppler to bilateral lower extremities; patient tolerated well. Dressing, left foot, intact, no drainage. Call light and personal items within reach. No complaints. 0650-Stable. No change from initial assessment. No complaints. 0704-Resting quietly in bed with wife at bedside. Stable. Shift Summary:  Rested quietly. Medicated per orders as needed for pian. Stable at shift change.

## 2020-02-01 NOTE — PROGRESS NOTES
0278  Bedside and Verbal shift change report given to [address] [place] [address] (oncoming nurse) by Lorraine Sherman RN (offgoing nurse). Report included the following information SBAR, Kardex, Intake/Output, MAR and Recent Results. 1958  Bedside and Verbal shift change report given to Rosa Elena Pacheco RN (oncoming nurse) by Jaime Patel RN (offgoing nurse). Report included the following information SBAR, Kardex, Intake/Output, MAR and Recent Results.

## 2020-02-01 NOTE — OP NOTES
[facility]    Name:  Ranjana Strickland  MR#:   [id number]  :  1950  ACCOUNT #:  [de-identified]  DATE OF SERVICE:  2020    PREOPERATIVE DIAGNOSES:  1.  Full-thickness wound dehiscence, left foot. 2.  Open foot ulceration with exposed muscle and bone, left foot. 3.  Peripheral vascular disease. 4.  Rheumatoid arthritis. POSTOPERATIVE DIAGNOSES:  1.  Full-thickness wound dehiscence, left foot. 2.  Open foot ulceration with exposed muscle and bone, left foot. 3.  Peripheral vascular disease. 4.  Rheumatoid arthritis. PROCEDURE PERFORMED:  1. Full-thickness excisional debridement to muscle, left foot. 2.  Incision and drainage of left foot. SURGEON:  Winifred Campo DPM    ASSISTANT:  None    ANESTHESIA:  MAC.    COMPLICATIONS:  None    SPECIMENS REMOVED:  None    IMPLANTS:  None    ESTIMATED BLOOD LOSS:  None    INDICATIONS:  This is a very pleasant, unfortunate 64-year-old male with severely debilitating rheumatoid arthritis attack in the left foot. He underwent first metatarsophalangeal joint fusion and subsequently had wound breakdown. He underwent angioplasty, which provided increased perfusion to the foot. At this point, I have decided to move forward with debridement and washout to try to salvage the fusion site. Complications included delayed healing, nonhealing, continued discomfort and certainly need for amputation down the road. PROCEDURE:  The patient was taken to the operating room, placed on the operative table in supine position. After the induction of IV sedation, application of a Betadine preparation to the left foot, the left foot was draped in the usual sterile manner. 1.  Incision and drainage of the left foot. At this point, incision and drainage was carried out in and around the left first metatarsophalangeal joint.   The plate and screws were noted to be quite stable and secure in the first metatarsal and proximal phalanx, although a portion of it was exposed. Serosanguineous discharge was noted, but no seng purulence. 2.  Excisional debridement to muscle. Using a scalpel blade on the left foot, attention now directed to the dorsal aspect of the left first metatarsophalangeal joint, after which point the area was debrided aggressively with scalpel blade down to the level of the underlying muscle and subcutaneous tissue. Necrotic periosteum was removed. The surgical site was copiously irrigated. Good bleeding was noted in and around the perfusion site. A Xeroform wet-to-dry dressing was applied with an attempt to place a wound VAC tomorrow.       Crecencio Severance, DPM      [id number]  D:  01/31/2020 17:29  T:  01/31/2020 23:34  JOB #:  [id number]

## 2020-02-01 NOTE — ROUTINE PROCESS
Bedside and Verbal shift change report given to Robel Sorto RN (oncoming nurse) by Dom Ayala RN (offgoing nurse). Report included the following information SBAR and Kardex.

## 2020-02-02 LAB
ANION GAP SERPL CALC-SCNC: 5 MMOL/L (ref 3–18)
BUN SERPL-MCNC: 9 MG/DL (ref 7–18)
BUN/CREAT SERPL: 12 (ref 12–20)
CALCIUM SERPL-MCNC: 8.4 MG/DL (ref 8.5–10.1)
CHLORIDE SERPL-SCNC: 109 MMOL/L (ref 100–111)
CO2 SERPL-SCNC: 28 MMOL/L (ref 21–32)
CREAT SERPL-MCNC: 0.78 MG/DL (ref 0.6–1.3)
ERYTHROCYTE [DISTWIDTH] IN BLOOD BY AUTOMATED COUNT: 15.7 % (ref 11.6–14.5)
GLUCOSE SERPL-MCNC: 75 MG/DL (ref 74–99)
HCT VFR BLD AUTO: 29.9 % (ref 36–48)
HGB BLD-MCNC: 9.6 G/DL (ref 13–16)
MCH RBC QN AUTO: 29.2 PG (ref 24–34)
MCHC RBC AUTO-ENTMCNC: 32.1 G/DL (ref 31–37)
MCV RBC AUTO: 90.9 FL (ref 74–97)
PLATELET # BLD AUTO: 653 K/UL (ref 135–420)
PMV BLD AUTO: 8.9 FL (ref 9.2–11.8)
POTASSIUM SERPL-SCNC: 3.7 MMOL/L (ref 3.5–5.5)
RBC # BLD AUTO: 3.29 M/UL (ref 4.7–5.5)
SODIUM SERPL-SCNC: 142 MMOL/L (ref 136–145)
VANCOMYCIN TROUGH SERPL-MCNC: 18.4 UG/ML (ref 10–20)
WBC # BLD AUTO: 6.6 K/UL (ref 4.6–13.2)

## 2020-02-02 PROCEDURE — 65270000029 HC RM PRIVATE

## 2020-02-02 PROCEDURE — 74011250637 HC RX REV CODE- 250/637: Performed by: PODIATRIST

## 2020-02-02 PROCEDURE — 74011000258 HC RX REV CODE- 258: Performed by: PODIATRIST

## 2020-02-02 PROCEDURE — 74011250637 HC RX REV CODE- 250/637: Performed by: HOSPITALIST

## 2020-02-02 PROCEDURE — 74011250636 HC RX REV CODE- 250/636: Performed by: HOSPITALIST

## 2020-02-02 PROCEDURE — 80048 BASIC METABOLIC PNL TOTAL CA: CPT

## 2020-02-02 PROCEDURE — 36592 COLLECT BLOOD FROM PICC: CPT

## 2020-02-02 PROCEDURE — 74011250636 HC RX REV CODE- 250/636: Performed by: PODIATRIST

## 2020-02-02 PROCEDURE — 85027 COMPLETE CBC AUTOMATED: CPT

## 2020-02-02 PROCEDURE — 74011250637 HC RX REV CODE- 250/637: Performed by: SURGERY

## 2020-02-02 PROCEDURE — 80202 ASSAY OF VANCOMYCIN: CPT

## 2020-02-02 RX ADMIN — PIPERACILLIN AND TAZOBACTAM 3.38 G: 3; .375 INJECTION, POWDER, LYOPHILIZED, FOR SOLUTION INTRAVENOUS at 21:52

## 2020-02-02 RX ADMIN — OXYCODONE HYDROCHLORIDE AND ACETAMINOPHEN 1 TABLET: 7.5; 325 TABLET ORAL at 18:08

## 2020-02-02 RX ADMIN — HYDROMORPHONE HYDROCHLORIDE 1 MG: 1 INJECTION, SOLUTION INTRAMUSCULAR; INTRAVENOUS; SUBCUTANEOUS at 14:23

## 2020-02-02 RX ADMIN — ENOXAPARIN SODIUM 40 MG: 40 INJECTION, SOLUTION INTRAVENOUS; SUBCUTANEOUS at 18:09

## 2020-02-02 RX ADMIN — OXYCODONE HYDROCHLORIDE AND ACETAMINOPHEN 1 TABLET: 7.5; 325 TABLET ORAL at 04:45

## 2020-02-02 RX ADMIN — OXYCODONE HYDROCHLORIDE AND ACETAMINOPHEN 1 TABLET: 7.5; 325 TABLET ORAL at 09:32

## 2020-02-02 RX ADMIN — TAMSULOSIN HYDROCHLORIDE 0.4 MG: 0.4 CAPSULE ORAL at 09:33

## 2020-02-02 RX ADMIN — HYDROMORPHONE HYDROCHLORIDE 1 MG: 1 INJECTION, SOLUTION INTRAMUSCULAR; INTRAVENOUS; SUBCUTANEOUS at 21:52

## 2020-02-02 RX ADMIN — VANCOMYCIN HYDROCHLORIDE 1250 MG: 1.25 INJECTION, POWDER, LYOPHILIZED, FOR SOLUTION INTRAVENOUS at 04:36

## 2020-02-02 RX ADMIN — SERTRALINE HYDROCHLORIDE 100 MG: 50 TABLET ORAL at 09:34

## 2020-02-02 RX ADMIN — Medication 10 ML: at 16:38

## 2020-02-02 RX ADMIN — Medication 1 CAPSULE: at 09:33

## 2020-02-02 RX ADMIN — HYDROMORPHONE HYDROCHLORIDE 1 MG: 1 INJECTION, SOLUTION INTRAMUSCULAR; INTRAVENOUS; SUBCUTANEOUS at 06:53

## 2020-02-02 RX ADMIN — MONTELUKAST 10 MG: 10 TABLET, FILM COATED ORAL at 09:34

## 2020-02-02 RX ADMIN — PANTOPRAZOLE SODIUM 40 MG: 40 TABLET, DELAYED RELEASE ORAL at 06:53

## 2020-02-02 RX ADMIN — METOPROLOL TARTRATE 25 MG: 25 TABLET ORAL at 09:34

## 2020-02-02 RX ADMIN — CALCIUM 500 MG: 500 TABLET ORAL at 18:04

## 2020-02-02 RX ADMIN — LISINOPRIL 5 MG: 5 TABLET ORAL at 09:34

## 2020-02-02 RX ADMIN — CHOLECALCIFEROL TAB 10 MCG (400 UNIT) 1 TABLET: 10 TAB at 09:34

## 2020-02-02 RX ADMIN — Medication 81 MG: at 09:33

## 2020-02-02 RX ADMIN — Medication 10 ML: at 22:15

## 2020-02-02 RX ADMIN — AMLODIPINE BESYLATE 10 MG: 5 TABLET ORAL at 09:33

## 2020-02-02 RX ADMIN — ALLOPURINOL 100 MG: 100 TABLET ORAL at 09:34

## 2020-02-02 RX ADMIN — PIPERACILLIN AND TAZOBACTAM 3.38 G: 3; .375 INJECTION, POWDER, LYOPHILIZED, FOR SOLUTION INTRAVENOUS at 02:14

## 2020-02-02 RX ADMIN — CLOPIDOGREL BISULFATE 75 MG: 75 TABLET ORAL at 09:34

## 2020-02-02 RX ADMIN — CALCIUM 500 MG: 500 TABLET ORAL at 09:33

## 2020-02-02 RX ADMIN — ATORVASTATIN CALCIUM 80 MG: 20 TABLET, FILM COATED ORAL at 21:52

## 2020-02-02 RX ADMIN — PIPERACILLIN AND TAZOBACTAM 3.38 G: 3; .375 INJECTION, POWDER, LYOPHILIZED, FOR SOLUTION INTRAVENOUS at 14:25

## 2020-02-02 RX ADMIN — LORATADINE 10 MG: 10 TABLET ORAL at 09:33

## 2020-02-02 RX ADMIN — Medication 10 ML: at 06:53

## 2020-02-02 RX ADMIN — VANCOMYCIN HYDROCHLORIDE 1250 MG: 1.25 INJECTION, POWDER, LYOPHILIZED, FOR SOLUTION INTRAVENOUS at 16:39

## 2020-02-02 RX ADMIN — COLLAGENASE SANTYL: 250 OINTMENT TOPICAL at 16:37

## 2020-02-02 RX ADMIN — OXYCODONE HYDROCHLORIDE AND ACETAMINOPHEN 1 TABLET: 7.5; 325 TABLET ORAL at 00:18

## 2020-02-02 RX ADMIN — PIPERACILLIN AND TAZOBACTAM 3.38 G: 3; .375 INJECTION, POWDER, LYOPHILIZED, FOR SOLUTION INTRAVENOUS at 09:35

## 2020-02-02 NOTE — PROGRESS NOTES
0740  Bedside and Verbal shift change report given to More Yen RN (oncoming nurse) by Dinah Cooper RN (offgoing nurse). Report included the following information SBAR, Kardex, Intake/Output, MAR and Recent Results. 2022  Bedside and Verbal shift change report given to Dinah Cooper RN (oncoming nurse) by More Yen RN (offgoing nurse). Report included the following information SBAR, Kardex, Intake/Output, MAR and Recent Results.

## 2020-02-02 NOTE — PROGRESS NOTES
Vascular    Visit Vitals  /72   Pulse 71   Temp 98 °F (36.7 °C)   Resp 16   Ht 5' 9\" (1.753 m)   Wt 82.8 kg (182 lb 8 oz)   SpO2 94%   BMI 26.95 kg/m²     In good spirits  Palpable left PT pulse  Vac in place    Patient states wound looks better at time of vac change yesterday.    -doing well s/p revasc  Following while in-house. Thanks  Dereck Vega MD, [place] [address]  Office:  [phone]  Pager:  [phone]

## 2020-02-02 NOTE — PROGRESS NOTES
Patient Name: Geraldine Nash   Age: 77 y.o.   Sex:  male  YOB: 1950   Medical Record Number: [id number]      Antimicrobial  Vancomycin   Indication Bone / Joint Infection (including Osteomyelitis)   Dose / Interval           Current Antimicrobial Therapy (168h ago, onward)      Ordered     Start Stop    01/27/20 1922  vancomycin (VANCOCIN) 1,250 mg in 0.9% sodium chloride 250 mL (VIAL-MATE)  1,250 mg,   IntraVENous,   EVERY 12 HOURS      01/28/20 0800 --    01/27/20 1652  piperacillin-tazobactam (ZOSYN) 3.375 g in 0.9% sodium chloride (MBP/ADV) 100 mL MBP  3.375 g,   IntraVENous,   EVERY 6 HOURS      01/27/20 1700 --               Last Level (if applicable) Lab Results   Component Value Date/Time    Reported dose: 1250 MG 01/30/2020 01:10 AM    Reported dose time: 1434 01/30/2020 01:10 AM    Reported dose date: [id number] 01/30/2020 01:10 AM     Vancomycin   Lab Results   Component Value Date/Time    Vancomycin,trough 18.4 02/02/2020 03:30 AM      Gentamicin   No results found for: GENP, GENT, GENR]  Tobramycin   No results found for: TOBP, TOBT, TOBR   Amikacin   No results found for: AMIKP, DAMIKP, AMIKT, DAMIKT, DAMIKR     Cultures (7 most recent)   GRAM STAIN   Date Value Ref Range Status   01/27/2020 FEW WBC'S   Final   01/27/2020 RARE GRAM POSITIVE RODS   Final   01/27/2020 RARE GRAM NEGATIVE RODS   Final     Culture result:   Date Value Ref Range Status   01/27/2020 MODERATE ESCHERICHIA COLI (A)   Final   01/27/2020 FEW DIPHTHEROIDS (A)   Final   01/27/2020 FEW STAPHYLOCOCCUS SPECIES, COAGULASE NEGATIVE (A)   Final   07/02/2019 FEW KLEBSIELLA PNEUMONIAE (A)   Final   07/02/2019 FEW ESCHERICHIA COLI (A)   Final   07/02/2019 RARE CANDIDA ALBICANS (A)   Final   07/02/2019 NO ANAEROBES ISOLATED 5 DAYS   Final      Renal Overview (72 hr)         Recent Labs     02/02/20  0330 02/01/20  0750 01/31/20  0230   BUN 9 9 12   CREA 0.78 0.90 0.80       CrCl (Current): Estimated Creatinine Clearance: 90.6 mL/min (based on SCr of 0.78 mg/dL). Temp (24-hr Max) Temp (24hrs), Av.2 °F (36.8 °C), Min:97.5 °F (36.4 °C), Max:99.2 °F (37.3 °C)       Hematology Recent Labs     20  0330 20  0750 20  0736   WBC 6.6 7.1 7.9   HGB 9.6* 10.0* 10.4*   HCT 29.9* 30.8* 32.1*   * 686* 704*        DOT  7     Notes Vancomycin trough = 18.4mcg/ml on 20 at 03:30. Patient is therapeutic. Will continue with current dose. Pharmacy to follow daily and will make changes to dose and/or frequency based on clinical status.        [facility], [phone]

## 2020-02-02 NOTE — PROGRESS NOTES
Shift summary: Wound vac in place to LLE, patent but no output overnight. Medicated x 3 for throbbing to L foot. Heel elevated and educated to refrain from bearing weight to affected extremity. PICC patent with good blood return from both ports. Bedside and Verbal shift change report given to oJse Mendiola RN (oncoming nurse) by Lino George RN (offgoing nurse). Report included the following information SBAR, Kardex, Intake/Output, MAR, Recent Results and Med Rec Status.

## 2020-02-03 LAB
ANION GAP SERPL CALC-SCNC: 4 MMOL/L (ref 3–18)
BUN SERPL-MCNC: 10 MG/DL (ref 7–18)
BUN/CREAT SERPL: 11 (ref 12–20)
CALCIUM SERPL-MCNC: 8.7 MG/DL (ref 8.5–10.1)
CHLORIDE SERPL-SCNC: 106 MMOL/L (ref 100–111)
CO2 SERPL-SCNC: 29 MMOL/L (ref 21–32)
CREAT SERPL-MCNC: 0.93 MG/DL (ref 0.6–1.3)
CRP SERPL-MCNC: 3.7 MG/DL (ref 0–0.3)
GLUCOSE SERPL-MCNC: 90 MG/DL (ref 74–99)
POTASSIUM SERPL-SCNC: 3.7 MMOL/L (ref 3.5–5.5)
SODIUM SERPL-SCNC: 139 MMOL/L (ref 136–145)

## 2020-02-03 PROCEDURE — 97605 NEG PRS WND THER DME<=50SQCM: CPT

## 2020-02-03 PROCEDURE — 74011250637 HC RX REV CODE- 250/637: Performed by: SURGERY

## 2020-02-03 PROCEDURE — 36592 COLLECT BLOOD FROM PICC: CPT

## 2020-02-03 PROCEDURE — 77030019952 HC CANSTR VAC ASST KCON -B

## 2020-02-03 PROCEDURE — 74011000258 HC RX REV CODE- 258: Performed by: INTERNAL MEDICINE

## 2020-02-03 PROCEDURE — 74011250637 HC RX REV CODE- 250/637: Performed by: HOSPITALIST

## 2020-02-03 PROCEDURE — 74011250636 HC RX REV CODE- 250/636: Performed by: ANESTHESIOLOGY

## 2020-02-03 PROCEDURE — 65270000029 HC RM PRIVATE

## 2020-02-03 PROCEDURE — 74011250637 HC RX REV CODE- 250/637: Performed by: PODIATRIST

## 2020-02-03 PROCEDURE — 77030019934 HC DRSG VAC ASST KCON -B

## 2020-02-03 PROCEDURE — 74011250636 HC RX REV CODE- 250/636: Performed by: PODIATRIST

## 2020-02-03 PROCEDURE — 74011250636 HC RX REV CODE- 250/636: Performed by: HOSPITALIST

## 2020-02-03 PROCEDURE — 80048 BASIC METABOLIC PNL TOTAL CA: CPT

## 2020-02-03 PROCEDURE — 74011250636 HC RX REV CODE- 250/636: Performed by: INTERNAL MEDICINE

## 2020-02-03 PROCEDURE — 86140 C-REACTIVE PROTEIN: CPT

## 2020-02-03 PROCEDURE — 74011000258 HC RX REV CODE- 258: Performed by: PODIATRIST

## 2020-02-03 RX ORDER — VANCOMYCIN 2 GRAM/500 ML IN 0.9 % SODIUM CHLORIDE INTRAVENOUS
2000 EVERY 24 HOURS
Qty: 19000 ML | Refills: 0 | Status: SHIPPED | OUTPATIENT
Start: 2020-02-03 | End: 2020-03-12

## 2020-02-03 RX ORDER — VANCOMYCIN 2 GRAM/500 ML IN 0.9 % SODIUM CHLORIDE INTRAVENOUS
2000 EVERY 24 HOURS
Status: DISCONTINUED | OUTPATIENT
Start: 2020-02-03 | End: 2020-02-04 | Stop reason: HOSPADM

## 2020-02-03 RX ADMIN — SODIUM CHLORIDE, SODIUM LACTATE, POTASSIUM CHLORIDE, AND CALCIUM CHLORIDE 125 ML/HR: 600; 310; 30; 20 INJECTION, SOLUTION INTRAVENOUS at 20:44

## 2020-02-03 RX ADMIN — CALCIUM 500 MG: 500 TABLET ORAL at 17:09

## 2020-02-03 RX ADMIN — ALLOPURINOL 100 MG: 100 TABLET ORAL at 09:40

## 2020-02-03 RX ADMIN — ERTAPENEM SODIUM 1 G: 1 INJECTION, POWDER, LYOPHILIZED, FOR SOLUTION INTRAMUSCULAR; INTRAVENOUS at 13:30

## 2020-02-03 RX ADMIN — HYDROMORPHONE HYDROCHLORIDE 1 MG: 1 INJECTION, SOLUTION INTRAMUSCULAR; INTRAVENOUS; SUBCUTANEOUS at 11:20

## 2020-02-03 RX ADMIN — LISINOPRIL 5 MG: 5 TABLET ORAL at 09:40

## 2020-02-03 RX ADMIN — PANTOPRAZOLE SODIUM 40 MG: 40 TABLET, DELAYED RELEASE ORAL at 06:30

## 2020-02-03 RX ADMIN — METOPROLOL TARTRATE 25 MG: 25 TABLET ORAL at 09:40

## 2020-02-03 RX ADMIN — MONTELUKAST 10 MG: 10 TABLET, FILM COATED ORAL at 09:40

## 2020-02-03 RX ADMIN — CALCIUM 500 MG: 500 TABLET ORAL at 09:40

## 2020-02-03 RX ADMIN — CLOPIDOGREL BISULFATE 75 MG: 75 TABLET ORAL at 09:39

## 2020-02-03 RX ADMIN — AMLODIPINE BESYLATE 10 MG: 5 TABLET ORAL at 09:40

## 2020-02-03 RX ADMIN — TAMSULOSIN HYDROCHLORIDE 0.4 MG: 0.4 CAPSULE ORAL at 09:40

## 2020-02-03 RX ADMIN — CHOLECALCIFEROL TAB 10 MCG (400 UNIT) 1 TABLET: 10 TAB at 09:40

## 2020-02-03 RX ADMIN — SERTRALINE HYDROCHLORIDE 100 MG: 50 TABLET ORAL at 09:39

## 2020-02-03 RX ADMIN — Medication 1 CAPSULE: at 09:39

## 2020-02-03 RX ADMIN — Medication 10 ML: at 13:31

## 2020-02-03 RX ADMIN — VANCOMYCIN HYDROCHLORIDE 2000 MG: 10 INJECTION, POWDER, LYOPHILIZED, FOR SOLUTION INTRAVENOUS at 14:26

## 2020-02-03 RX ADMIN — HYDROMORPHONE HYDROCHLORIDE 1 MG: 1 INJECTION, SOLUTION INTRAMUSCULAR; INTRAVENOUS; SUBCUTANEOUS at 03:35

## 2020-02-03 RX ADMIN — Medication 10 ML: at 06:31

## 2020-02-03 RX ADMIN — OXYCODONE HYDROCHLORIDE AND ACETAMINOPHEN 1 TABLET: 7.5; 325 TABLET ORAL at 21:21

## 2020-02-03 RX ADMIN — OXYCODONE HYDROCHLORIDE AND ACETAMINOPHEN 1 TABLET: 7.5; 325 TABLET ORAL at 15:32

## 2020-02-03 RX ADMIN — PIPERACILLIN AND TAZOBACTAM 3.38 G: 3; .375 INJECTION, POWDER, LYOPHILIZED, FOR SOLUTION INTRAVENOUS at 09:42

## 2020-02-03 RX ADMIN — PIPERACILLIN AND TAZOBACTAM 3.38 G: 3; .375 INJECTION, POWDER, LYOPHILIZED, FOR SOLUTION INTRAVENOUS at 03:32

## 2020-02-03 RX ADMIN — LORATADINE 10 MG: 10 TABLET ORAL at 09:40

## 2020-02-03 RX ADMIN — COLLAGENASE SANTYL: 250 OINTMENT TOPICAL at 09:00

## 2020-02-03 RX ADMIN — VANCOMYCIN HYDROCHLORIDE 1250 MG: 1.25 INJECTION, POWDER, LYOPHILIZED, FOR SOLUTION INTRAVENOUS at 05:05

## 2020-02-03 RX ADMIN — Medication 81 MG: at 09:39

## 2020-02-03 RX ADMIN — ATORVASTATIN CALCIUM 80 MG: 20 TABLET, FILM COATED ORAL at 21:21

## 2020-02-03 RX ADMIN — ENOXAPARIN SODIUM 40 MG: 40 INJECTION, SOLUTION INTRAVENOUS; SUBCUTANEOUS at 18:09

## 2020-02-03 NOTE — PROGRESS NOTES
Progress Note      Patient: Star Madison               Sex: male          DOA: 1/27/2020       YOB: 1950      Age:  77 y.o.        LOS:  LOS: 7 days               Subjective:   Patient seen today for follow up. No new complaints.     Medications:     Current Facility-Administered Medications:     lactated Ringers infusion, 125 mL/hr, IntraVENous, CONTINUOUS, Reza Cantu MD, Last Rate: 125 mL/hr at 02/01/20 2042, 125 mL/hr at 02/01/20 2042    clopidogreL (PLAVIX) tablet 75 mg, 75 mg, Oral, DAILY, Christina Nice MD, 75 mg at 02/02/20 0934    HYDROmorphone (PF) (DILAUDID) injection 1 mg, 1 mg, IntraVENous, Q4H PRN, Cici Campo DPM, 1 mg at 02/03/20 0335    collagenase (SANTYL) 250 unit/gram ointment, , Topical, DAILY, Cici Campo, DPM    allopurinoL (ZYLOPRIM) tablet 100 mg, 100 mg, Oral, DAILY, Brodie Henao MD, 100 mg at 02/02/20 0934    amLODIPine (NORVASC) tablet 10 mg, 10 mg, Oral, DAILY, Brodie Henao MD, 10 mg at 02/02/20 6543    aspirin delayed-release tablet 81 mg, 81 mg, Oral, DAILY, Brodie Henao MD, 81 mg at 02/02/20 0933    atorvastatin (LIPITOR) tablet 80 mg, 80 mg, Oral, QHS, Brodie Henao MD, 80 mg at 02/02/20 2152    calcium carbonate (OS-CORBY) tablet 500 mg [elemental], 500 mg, Oral, BID WITH MEALS, Brodie Henao MD, 500 mg at 02/02/20 1804    cholecalciferol (VITAMIN D3) (400 Units /10 mcg) tablet 1 Tab, 400 Units, Oral, DAILY, Brodie Henao MD, 1 Tab at 02/02/20 0934    loratadine (CLARITIN) tablet 10 mg, 10 mg, Oral, DAILY, Brodie Henao MD, 10 mg at 02/02/20 0933    lisinopril (PRINIVIL, ZESTRIL) tablet 5 mg, 5 mg, Oral, DAILY, Brodie Henao MD, 5 mg at 02/02/20 0934    metoprolol tartrate (LOPRESSOR) tablet 25 mg, 25 mg, Oral, DAILY, Brodie Henao MD, 25 mg at 02/02/20 0934    montelukast (SINGULAIR) tablet 10 mg, 10 mg, Oral, DAILY, Brodie Henao MD, 10 mg at 02/02/20 0934    fish oil-omega-3 fatty acids 340-1,000 mg capsule 1 Cap, 1 Cap, Oral, DAILY, Brodie Henao MD, 1 Cap at 02/02/20 0933    pantoprazole (PROTONIX) tablet 40 mg, 40 mg, Oral, ACB, Brodie Henao MD, 40 mg at 02/03/20 0630    sertraline (ZOLOFT) tablet 100 mg, 100 mg, Oral, DAILY, Brodie Singh MD, 100 mg at 02/02/20 0934    tamsulosin (FLOMAX) capsule 0.4 mg, 0.4 mg, Oral, DAILY, Brodie Singh MD, 0.4 mg at 02/02/20 0933    heparin (porcine) 100 unit/mL injection 500 Units, 500 Units, InterCATHeter, Q8H PRN, Marina Puentes MD, 500 Units at 01/28/20 1008    sodium chloride (NS) flush 5-40 mL, 5-40 mL, IntraVENous, Q8H, Salvador Campo DPDAVID, 10 mL at 02/03/20 0631    sodium chloride (NS) flush 5-40 mL, 5-40 mL, IntraVENous, PRN, Salvador Campo DPM, 10 mL at 01/30/20 1416    piperacillin-tazobactam (ZOSYN) 3.375 g in 0.9% sodium chloride (MBP/ADV) 100 mL MBP, 3.375 g, IntraVENous, Q6H, Madeleine Campo DPM, Last Rate: 200 mL/hr at 02/03/20 0332, 3.375 g at 02/03/20 0332    oxyCODONE-acetaminophen (PERCOCET 7.5) 7.5-325 mg per tablet 1 Tab, 1 Tab, Oral, Q4H PRN, Madeleine Campo DPM, 1 Tab at 02/02/20 1808    enoxaparin (LOVENOX) injection 40 mg, 40 mg, SubCUTAneous, Q24H, Brodie Henao MD, 40 mg at 02/02/20 1809    Vancomycin-pharmacy to consult, 1 Each, Other, Rx Dosing/Monitoring, Madeleine Campo DPDAVID    vancomycin (VANCOCIN) 1,250 mg in 0.9% sodium chloride 250 mL (VIAL-MATE), 1,250 mg, IntraVENous, Q12H, Alber, Madeleine Jameyme, DPM, 1,250 mg at 02/03/20 0505    Review of Systems          Objective:      Visit Vitals  /66 (BP 1 Location: Left arm, BP Patient Position: At rest)   Pulse 69   Temp 97.7 °F (36.5 °C)   Resp 16   Ht 5' 9\" (1.753 m)   Wt 82.8 kg (182 lb 8 oz)   SpO2 97%   BMI 26.95 kg/m²       Physical Exam:  Foot appears to be healthier. Wound with good granulation but still exposed plate. No necrosis.     Labs:  Recent Results (from the past 24 hour(s))   METABOLIC PANEL, BASIC    Collection Time: 02/03/20  4:53 AM   Result Value Ref Range    Sodium 139 136 - 145 mmol/L    Potassium 3.7 3.5 - 5.5 mmol/L    Chloride 106 100 - 111 mmol/L    CO2 29 21 - 32 mmol/L    Anion gap 4 3.0 - 18 mmol/L    Glucose 90 74 - 99 mg/dL    BUN 10 7.0 - 18 MG/DL    Creatinine 0.93 0.6 - 1.3 MG/DL    BUN/Creatinine ratio 11 (L) 12 - 20      GFR est AA >60 >60 ml/min/1.73m2    GFR est non-AA >60 >60 ml/min/1.73m2    Calcium 8.7 8.5 - 10.1 MG/DL           Assessment/Plan     Principal Problem:    Cellulitis and abscess of lower extremity (1/27/2020)    Active Problems: Aortic stenosis (12/5/2016)      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 Recommended discharge home with wound vac if possible. If not then jennifer dressing and hydrogel. Cont IV abx at home per ID. NWB with knee scooter and follow up my office next Monday.

## 2020-02-03 NOTE — PROGRESS NOTES
NUTRITION UPDATE    - received consult for nutrition; per nurse pt is eating well; will continue to monitor if appetite changes       Brianna Aguilar RD  PAGER:  [phone]

## 2020-02-03 NOTE — PROGRESS NOTES
Vascular    Visit Vitals  /75 (BP 1 Location: Left arm, BP Patient Position: Sitting)   Pulse 81   Temp 98.1 °F (36.7 °C)   Resp 16   Ht 5' 9\" (1.753 m)   Wt 82.8 kg (182 lb 8 oz)   SpO2 98%   BMI 26.95 kg/m²     Patient in good spirits and anxious for discharge home. Dr Lanny Newsome notes read and appreciated. Good flow in PT. Hoping for wound healing. Will f/u in the office in a month. Thanks  Dereck Strickland MD, [place] [address]  Office:  [phone]  Pager:  [phone]

## 2020-02-03 NOTE — PROGRESS NOTES
Bedside and Verbal shift change report given to Norman Toussaint RN (oncoming nurse) by Rosa Castle RN (offgoing nurse). Report included the following information SBAR, Kardex, Intake/Output and MAR.

## 2020-02-03 NOTE — PROGRESS NOTES
Assumed care of male pt. Wound vac to left great toe dry and intact, placed by wound care nurse today.

## 2020-02-03 NOTE — PROGRESS NOTES
D/C Plan: [facility] 2/4/2020    CM and provider met with pt at bedside to discuss transition of care. Pt will be on daily IVABX. Given this, pt has elected to go to [facility]. Pt prefers to go to [facility] for [facility]. Pt has selected [facility] to assist with home wound vac. Pt's family will transport pt home upon discharge. Anticipate pt will transition home tomorrow with [facility] and [facility]IC at [facility]. CM to continue to follow and assist.    Care Management Interventions  PCP Verified by CM: Yes  Mode of Transport at Discharge:  Other (see comment)(Family)  Transition of Care Consult (CM Consult): Discharge Planning, [address] [facility], [facility]: Yes  Health Maintenance Reviewed: Yes  Physical Therapy Consult: Yes  Current Support Network: Family Lives Nearby  Confirm Follow Up Transport: Family  The Plan for Transition of Care is Related to the Following Treatment Goals : [facility] and [facility]IC at [facility]  The Patient and/or Patient Representative was Provided with a Choice of Provider and Agrees with the Discharge Plan?: Yes  Name of the Patient Representative Who was Provided with a Choice of Provider and Agrees with the Discharge Plan: pt /Howie John  Fowler of Choice List was Provided with Basic Dialogue that Supports the Patient's Individualized Plan of Care/Goals, Treatment Preferences and Shares the Quality Data Associated with the Providers?: Yes  Discharge Location  Discharge Placement: Home with home health(with [facility]IC at [facility])

## 2020-02-03 NOTE — DISCHARGE SUMMARY
Progress Note      Patient: Geraldine Nash               Sex: male          DOA: 1/27/2020       YOB: 1950      Age:  77 y.o.        LOS:  LOS: 7 days               Subjective:   Patient seen today for follow up.   Doing well following debridement and angioplasty    Medications:     Current Facility-Administered Medications:     lactated Ringers infusion, 125 mL/hr, IntraVENous, CONTINUOUS, Fred Gee MD, Last Rate: 125 mL/hr at 02/01/20 2042, 125 mL/hr at 02/01/20 2042    clopidogreL (PLAVIX) tablet 75 mg, 75 mg, Oral, DAILY, Amandeep Harris MD, 75 mg at 02/02/20 0934    HYDROmorphone (PF) (DILAUDID) injection 1 mg, 1 mg, IntraVENous, Q4H PRN, Winifred Campo DPM, 1 mg at 02/03/20 0335    collagenase (SANTYL) 250 unit/gram ointment, , Topical, DAILY, Winifred Campo DPM    allopurinoL (ZYLOPRIM) tablet 100 mg, 100 mg, Oral, DAILY, Brodie Henao MD, 100 mg at 02/02/20 0934    amLODIPine (NORVASC) tablet 10 mg, 10 mg, Oral, DAILY, Brodie Henao MD, 10 mg at 02/02/20 5023    aspirin delayed-release tablet 81 mg, 81 mg, Oral, DAILY, Brodie Henao MD, 81 mg at 02/02/20 0933    atorvastatin (LIPITOR) tablet 80 mg, 80 mg, Oral, QHS, Brodie Henao MD, 80 mg at 02/02/20 2152    calcium carbonate (OS-CORBY) tablet 500 mg [elemental], 500 mg, Oral, BID WITH MEALS, Brodie Henao MD, 500 mg at 02/02/20 1804    cholecalciferol (VITAMIN D3) (400 Units /10 mcg) tablet 1 Tab, 400 Units, Oral, DAILY, Brodie Henao MD, 1 Tab at 02/02/20 0934    loratadine (CLARITIN) tablet 10 mg, 10 mg, Oral, DAILY, Brodie Henao MD, 10 mg at 02/02/20 0933    lisinopril (PRINIVIL, ZESTRIL) tablet 5 mg, 5 mg, Oral, DAILY, Brodie Henao MD, 5 mg at 02/02/20 0934    metoprolol tartrate (LOPRESSOR) tablet 25 mg, 25 mg, Oral, DAILY, Brodie Henao MD, 25 mg at 02/02/20 0934    montelukast (SINGULAIR) tablet 10 mg, 10 mg, Oral, DAILY, Brodie Chauhan MD, 10 mg at 02/02/20 0934   fish oil-omega-3 fatty acids 340-1,000 mg capsule 1 Cap, 1 Cap, Oral, DAILY, Brodie Henao MD, 1 Cap at 02/02/20 0933    pantoprazole (PROTONIX) tablet 40 mg, 40 mg, Oral, ACB, Brodie Henao MD, 40 mg at 02/03/20 0630    sertraline (ZOLOFT) tablet 100 mg, 100 mg, Oral, DAILY, Brodie Irizarry MD, 100 mg at 02/02/20 0934    tamsulosin (FLOMAX) capsule 0.4 mg, 0.4 mg, Oral, DAILY, Brodie Irizarry MD, 0.4 mg at 02/02/20 0933    heparin (porcine) 100 unit/mL injection 500 Units, 500 Units, InterCATHeter, Q8H PRN, Manfred Moody MD, 500 Units at 01/28/20 1008    sodium chloride (NS) flush 5-40 mL, 5-40 mL, IntraVENous, Q8H, Salvador Campo DPM, 10 mL at 02/03/20 0631    sodium chloride (NS) flush 5-40 mL, 5-40 mL, IntraVENous, PRN, Salvador Campo DPM, 10 mL at 01/30/20 1416    piperacillin-tazobactam (ZOSYN) 3.375 g in 0.9% sodium chloride (MBP/ADV) 100 mL MBP, 3.375 g, IntraVENous, Q6H, Dev Campo, DPM, Last Rate: 200 mL/hr at 02/03/20 0332, 3.375 g at 02/03/20 0332    oxyCODONE-acetaminophen (PERCOCET 7.5) 7.5-325 mg per tablet 1 Tab, 1 Tab, Oral, Q4H PRN, Dev Campo DPM, 1 Tab at 02/02/20 1808    enoxaparin (LOVENOX) injection 40 mg, 40 mg, SubCUTAneous, Q24H, Brodie Henao MD, 40 mg at 02/02/20 1809    Vancomycin-pharmacy to consult, 1 Each, Other, Rx Dosing/Monitoring, Dev Campo, DPM    vancomycin (VANCOCIN) 1,250 mg in 0.9% sodium chloride 250 mL (VIAL-MATE), 1,250 mg, IntraVENous, Q12H, Dev Campo, DPM, 1,250 mg at 02/03/20 0505            Objective:      Visit Vitals  /66 (BP 1 Location: Left arm, BP Patient Position: At rest)   Pulse 69   Temp 97.7 °F (36.5 °C)   Resp 16   Ht 5' 9\" (1.753 m)   Wt 82.8 kg (182 lb 8 oz)   SpO2 97%   BMI 26.95 kg/m²           Labs:  Recent Results (from the past 24 hour(s))   METABOLIC PANEL, BASIC    Collection Time: 02/03/20  4:53 AM   Result Value Ref Range    Sodium 139 136 - 145 mmol/L    Potassium 3.7 3.5 - 5.5 mmol/L    Chloride 106 100 - 111 mmol/L    CO2 29 21 - 32 mmol/L    Anion gap 4 3.0 - 18 mmol/L    Glucose 90 74 - 99 mg/dL    BUN 10 7.0 - 18 MG/DL    Creatinine 0.93 0.6 - 1.3 MG/DL    BUN/Creatinine ratio 11 (L) 12 - 20      GFR est AA >60 >60 ml/min/1.73m2    GFR est non-AA >60 >60 ml/min/1.73m2    Calcium 8.7 8.5 - 10.1 MG/DL           Assessment/Plan     Principal Problem:    Cellulitis and abscess of lower extremity (1/27/2020)    Active Problems: Aortic stenosis (12/5/2016)      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 Course of treatment included admission and surgical debridement following initiatiion of IV abx and angiopasty. PT artery reconstituted distally but there was stenosis proximally. He has progressed well but still a chance for amputation if wound does not close with local wound care.   Recommend dc tomorrow with [facility] for wound vac and IV abx

## 2020-02-03 NOTE — PROGRESS NOTES
Shift summary: Wound vac continuously read error message. This RN and nursing supervisor, EHSAN Marquez, attempted to trouble shoot but unsuccessful. No wound care personnel on call for the evening. Dr. Johanna Parsons notified and ordered removal of wound vac drsg and to apply saline soaked gauze, xeroform, dry 4x4 and kerlix to left foot. Nursing team to notify wound care to reapply wound vac in the morning. [phone] [facility] office number for Dr. Raisa Hogan. Answering service unable to reach him at this time, left message to return phone call. [phone] - Per patient, MD has already rounded on pt and dressing was changed at that time by MD. Wound care office called but no answer. Oncoming RN made aware that follow up is needed. Bedside and Verbal shift change report given to FRANK Calles RN (oncoming nurse) by Landen Story RN (offgoing nurse). Report included the following information SBAR, Kardex, Intake/Output, MAR and Recent Results.

## 2020-02-03 NOTE — WOUND CARE
IP WOUND CONSULT    Samia John  MEDICAL RECORD NUMBER:  [id number]  AGE: 77 y.o. GENDER: male  : 1950  TODAY'S DATE:  2/3/2020    GENERAL     [x] Follow-up   [] New Consult    Rossana Pichardo is a 77 y.o. male referred by:   [] Physician  [] Nursing  [] Other:         PAST MEDICAL HISTORY    Past Medical History:   Diagnosis Date    Anxiety     Anxiety     Aortic stenosis     Aortic stenosis     Arthritis     BPH (benign prostatic hypertrophy)     Depression     Depression     GERD (gastroesophageal reflux disease)     Gout     HTN (hypertension)     Renal calculi         PAST SURGICAL HISTORY    Past Surgical History:   Procedure Laterality Date    HX AORTIC VALVE REPLACEMENT      HX COLONOSCOPY      HX HERNIA REPAIR      HX KNEE REPLACEMENT      HX LITHOTRIPSY  2017    [facility]: ESWL- 7 mm x 10 mm Right UPJ / Renal Pelvis stone, 2500 shocks max intensity level 5 Dr. Gale Germain History   Problem Relation Age of Onset    Heart Disease Father        SOCIAL HISTORY    Social History     Tobacco Use    Smoking status: Never Smoker    Smokeless tobacco: Never Used   Substance Use Topics    Alcohol use: Yes    Drug use: Not on file       ALLERGIES    No Known Allergies    MEDICATIONS    No current facility-administered medications on file prior to encounter. Current Outpatient Medications on File Prior to Encounter   Medication Sig Dispense Refill    cephALEXin (KEFLEX) 500 mg capsule Take 500 mg by mouth four (4) times daily. Indications: Pt is not sure of correct dose      aspirin delayed-release 81 mg tablet Take 81 mg by mouth daily.  lisinopril (PRINIVIL, ZESTRIL) 5 mg tablet Take 5 mg by mouth daily. Take once in the Morning      atorvastatin (LIPITOR) 80 mg tablet Take 80 mg by mouth nightly. Take once in the Evening      levocetirizine (XYZAL) 5 mg tablet Take 5 mg by mouth daily.  Take once in the Evening      montelukast (SINGULAIR) 10 mg tablet Take 10 mg by mouth daily. Take once in the morning      tamsulosin (FLOMAX) 0.4 mg capsule Take 1 Cap by mouth daily. 1 Cap 0    amLODIPine (NORVASC) 10 mg tablet 10 mg.      omeprazole (PRILOSEC) 10 mg capsule Take 20 mg by mouth daily.  sertraline (ZOLOFT) 50 mg tablet Take 100 mg by mouth daily.  ibuprofen (MOTRIN) 800 mg tablet Take 800 mg by mouth two (2) times a day. Indications: pain      ENBREL SURECLICK 50 mg/mL (1.01 mL) injection 50 mg Every Thursday. Take in the evening or before bedtime      allopurinol (ZYLOPRIM) 100 mg tablet Take 100 mg by mouth daily.  metoprolol tartrate (LOPRESSOR) 25 mg tablet Take 25 mg by mouth daily. Once a day in the morning  0    multivitamin (ONE A DAY) tablet Take 1 Tab by Mouth Once a Day.  calcium carbonate (CALTREX) 600 mg (1,500 mg) tablet Take by Mouth Once a Day.  omega 3-dha-epa-fish oil (FISH OIL) 60- mg cap Take 500 mg by mouth daily.  cholecalciferol, vitamin d3, (VITAMIN D3) 400 unit cap Take 400 Units by mouth daily.          Wt Readings from Last 3 Encounters:   01/29/20 82.8 kg (182 lb 8 oz)   12/04/19 83.5 kg (184 lb)   07/10/19 80.6 kg (177 lb 9.6 oz)       [unfilled]  Visit Vitals  /80 (BP 1 Location: Left arm, BP Patient Position: Supine)   Pulse 76   Temp 99.2 °F (37.3 °C)   Resp 16   Ht 5' 9\" (1.753 m)   Wt 82.8 kg (182 lb 8 oz)   SpO2 97%   BMI 26.95 kg/m²       ASSESSMENT     Ulcer Identification:  Ulcer Type: non-healing surgical    Contributing Factors:     Wound Foot Left (Active)   Dressing Status Clean, dry, and intact 2/3/2020  3:10 PM   Dressing Type Wet to dry;Gauze wrap (candelario) 2/3/2020  2:00 PM   Incision Site Well Approximated No 2/3/2020  2:00 PM   Non-staged Wound Description Full thickness 2/3/2020  2:00 PM   Pressure Injury Deep Tissue Injury 2/3/2020  7:49 AM   Erwin Grading Scale 0-5  Grade 3 2/3/2020  7:49 AM   Wound Length (cm) 5.5 cm 2/3/2020  3:10 PM   Wound Width (cm) 2.2 cm 2/3/2020  3:10 PM   Wound Depth (cm) 0.3 cm 2/3/2020  3:10 PM   Wound Surface Area (cm^2) 12.1 cm^2 2/3/2020  3:10 PM   Wound Volume (cm^3) 3.63 cm^3 2/3/2020  3:10 PM   Condition of Base Granulation 2/3/2020  3:10 PM   Tissue Type Percent Pink 60 2/3/2020  3:10 PM   Tissue Type Percent Red 40 2/3/2020  3:10 PM   Drainage Amount None 2/3/2020  2:00 PM   Wound Odor None 2/3/2020  2:00 PM   Dressing Changed Changed/New 2/3/2020  3:10 PM   Dressing Type Applied Vacuum dressing 2/3/2020  3:10 PM   Procedure Tolerated Well 2/3/2020  3:10 PM   Number of days: 3       [REMOVED] Wound Foot Anterior; Left (Removed)   Number of days: 29          PLAN      replace wound vac    Teaching completed with:   [x] Patient           [x] Family member       [] Caregiver          [] Nursing  [] Other    Patient/Caregiver Teaching:  Level of patient/caregiver understanding able to:   [x] Indicates understanding       [] Needs reinforcement  [] Unsuccessful      [] Verbal Understanding  [] Demonstrated understanding       [] No evidence of learning  [] Refused teaching         [] N/A       Electronically signed by Shamar Avalos RN on 2/3/2020 at 3:22 PM

## 2020-02-03 NOTE — CONSULTS
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  No f/s/c       Interval History:  CC:  I'm ready for DC! Events of weekend reviewed. Doing well. Had a good weekend; Foot looks better. Current Antimicrobials: Prior Antimicrobials   1. Vanco IV (1/27-) #7  2. Pip/tzb IV (1/27-) #7        Assessment Plan:   L Great Toe hardware infection  1/27:  CRP - 41mg/L    Ready to transition to outpatient. ->POD #3    OPIC delivery. Transition vancomycin to 2000mg IV daily along with ertapenem 1000mg IV daily; both terminate on 12MAR2020 (instead of Friday or weekend)  Weekly labs (qMondays) - CBC, BMP, Vanco level for PHARM dosing, and quantitative CRP; I can peek into [facility] for results.     F/U me on 12FEB2020   PAD    RA    HTN      Microbiology:                1/27 - Wound (+) E coli (S-all) and CoNS        Lines / Catheters:         R PICC    Patient Active Problem List   Diagnosis Code    Enlarged prostate with lower urinary tract symptoms (LUTS) N40.1    Calculus of both kidneys N20.0    Rheumatoid arthritis ([facility] M06.9    Essential hypertension I10    GERD (gastroesophageal reflux disease) K21.9    Disorder of bone and cartilage, unspecified M89.9, M94.9    Depression F32.9    Aortic stenosis I35.0    Gout M10.9    Impaired fasting blood sugar R73.01    Male erectile dysfunction, unspecified N52.9    Anxiety state F41.1    Chronic allergic rhinitis J30.9    History of aortic valve replacement Z95.2    Deviated nasal septum J34.2    Chronic maxillary sinusitis J32.0    Multiple-type hyperlipidemia E78.2    SHAILESH (obstructive sleep apnea) G47.33    Sensorineural hearing loss (SNHL) of both ears H90.3    Cellulitis L03.90    Cellulitis of foot, left L03. 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 7XXA       Current Facility-Administered Medications   Medication Dose Route Frequency    vancomycin (VANCOCIN) 2000 mg in  ml infusion  2,000 mg IntraVENous Q24H    lactated Ringers infusion  125 mL/hr IntraVENous CONTINUOUS    clopidogreL (PLAVIX) tablet 75 mg  75 mg Oral DAILY    HYDROmorphone (PF) (DILAUDID) injection 1 mg  1 mg IntraVENous Q4H PRN    collagenase (SANTYL) 250 unit/gram ointment   Topical DAILY    allopurinoL (ZYLOPRIM) tablet 100 mg  100 mg Oral DAILY    amLODIPine (NORVASC) tablet 10 mg  10 mg Oral DAILY    aspirin delayed-release tablet 81 mg  81 mg Oral DAILY    atorvastatin (LIPITOR) tablet 80 mg  80 mg Oral QHS    calcium carbonate (OS-CORBY) tablet 500 mg [elemental]  500 mg Oral BID WITH MEALS    cholecalciferol (VITAMIN D3) (400 Units /10 mcg) tablet 1 Tab  400 Units Oral DAILY    loratadine (CLARITIN) tablet 10 mg  10 mg Oral DAILY    lisinopril (PRINIVIL, ZESTRIL) tablet 5 mg  5 mg Oral DAILY    metoprolol tartrate (LOPRESSOR) tablet 25 mg  25 mg Oral DAILY    montelukast (SINGULAIR) tablet 10 mg  10 mg Oral DAILY    fish oil-omega-3 fatty acids 340-1,000 mg capsule 1 Cap  1 Cap Oral DAILY    pantoprazole (PROTONIX) tablet 40 mg  40 mg Oral ACB    sertraline (ZOLOFT) tablet 100 mg  100 mg Oral DAILY    tamsulosin (FLOMAX) capsule 0.4 mg  0.4 mg Oral DAILY    heparin (porcine) 100 unit/mL injection 500 Units  500 Units InterCATHeter Q8H PRN    sodium chloride (NS) flush 5-40 mL 5-40 mL IntraVENous Q8H    sodium chloride (NS) flush 5-40 mL  5-40 mL IntraVENous PRN    piperacillin-tazobactam (ZOSYN) 3.375 g in 0.9% sodium chloride (MBP/ADV) 100 mL MBP  3.375 g IntraVENous Q6H    oxyCODONE-acetaminophen (PERCOCET 7.5) 7.5-325 mg per tablet 1 Tab  1 Tab Oral Q4H PRN    enoxaparin (LOVENOX) injection 40 mg  40 mg SubCUTAneous Q24H    Vancomycin-pharmacy to consult  1 Each Other Rx Dosing/Monitoring         Review of Systems - General ROS: negative for - chills, fever or night sweats  Respiratory ROS: no cough, shortness of breath, or wheezing  Cardiovascular ROS: no chest pain or dyspnea on exertion  Gastrointestinal ROS: no abdominal pain, change in bowel habits, or black or bloody stools       Objective:  Visit Vitals  /80 (BP 1 Location: Left arm, BP Patient Position: Supine)   Pulse 76   Temp 99.2 °F (37.3 °C)   Resp 16   Ht 5' 9\" (1.753 m)   Wt 82.8 kg (182 lb 8 oz)   SpO2 97%   BMI 26.95 kg/m²       Temp (24hrs), Av.8 °F (37.1 °C), Min:97.7 °F (36.5 °C), Max:99.7 °F (37.6 °C)      GEN: Elderly WM in NAD  HEENT: anicteric  CHEST: CTA  CVS:RRR  R PICC:  Non-inflamed  EXT: L foot wrapped       Lab results:    Chemistry  Recent Labs     20  0453 20  0330 20  0750   GLU 90 75 86    142 140   K 3.7 3.7 3.7    109 110   CO2 29 28 27   BUN 10 9 9   CREA 0.93 0.78 0.90   CA 8.7 8.4* 8.2*   AGAP 4 5 3   BUCR 11* 12 10*       CBC w/ Diff  Recent Labs     20  0330 20  0750   WBC 6.6 7.1   RBC 3.29* 3.39*   HGB 9.6* 10.0*   HCT 29.9* 30.8*   * 686*       Microbiology  All Micro Results     Procedure Component Value Units Date/Time    CULTURE, Misael  STAIN [[id number]]  (Abnormal)  (Susceptibility) Collected:  20 1559    Order Status:  Completed Specimen:  Wound Drainage Updated:  20     Special Requests: NO SPECIAL REQUESTS        GRAM STAIN FEW WBC'S         RARE Debbrah Luis M POSITIVE RODS         RARE GRAM NEGATIVE RODS Culture result: MODERATE ESCHERICHIA COLI         FEW DIPHTHEROIDS               FEW STAPHYLOCOCCUS SPECIES, COAGULASE NEGATIVE                 Juan Carlos Amador MD  Cell [phone]  [facility]   2/3/2020   11:16 AM

## 2020-02-03 NOTE — PROGRESS NOTES
Hospitalist Progress Note    Patient: Kassandra Sahu MRN: [id number]  CSN: [id number]    YOB: 1950  Age: 77 y.o. Sex: male    DOA: 1/27/2020 LOS:  LOS: 7 days                Assessment/Plan     Patient Active Problem List   Diagnosis Code    Enlarged prostate with lower urinary tract symptoms (LUTS) N40.1    Calculus of both kidneys N20.0    Rheumatoid arthritis ([facility] M06.9    Essential hypertension I10    GERD (gastroesophageal reflux disease) K21.9    Disorder of bone and cartilage, unspecified M89.9, M94.9    Depression F32.9    Aortic stenosis I35.0    Gout M10.9    Impaired fasting blood sugar R73.01    Male erectile dysfunction, unspecified N52.9    Anxiety state F41.1    Chronic allergic rhinitis J30.9    History of aortic valve replacement Z95.2    Deviated nasal septum J34.2    Chronic maxillary sinusitis J32.0    Multiple-type hyperlipidemia E78.2    SHAILESH (obstructive sleep apnea) G47.33    Sensorineural hearing loss (SNHL) of both ears H90.3    Cellulitis L03.90    Cellulitis of foot, left L03. 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 7XXA            Patient admitted by podiatry service,     Left foot cellulitis/wound dehiscence/exposed hardware-  Started on vancomycin and Zosyn, follow wound cultures. Seen by plastic surgery, ID, vascular  S/p Abdominal aortogram; bilateral lower extremity arteriogram, fourth-order cannulation of left posterior tibial artery; orbital atherectomy, left posterior tibial artery; percutaneous transluminal angioplasty, left posterior tibial artery. On 01/30/2020.   Antibiotics per ID    S/p I&D left foot with excisional debridement.     Hypertension-  Continue with home medications monitor blood pressure.     History of rheumatoid arthritis-  He is on Enbrel at home which was stopped because of wound healing. We will continue to hold Enbrel at this point.     History of aortic valve replacement-  Bovine not on any anticoagulation.     GERD-  Continue with PPI     Gout-  Continue with allopurinol.     BPH-  Continue with Flomax     DVT prophylaxis with Lovenox    Disposition : per primary    Review of systems  General: No fevers or chills. Cardiovascular: No chest pain or pressure. No palpitations. Pulmonary: No shortness of breath. Gastrointestinal: No nausea, vomiting. Physical Exam:  General: Awake, cooperative, no acute distress    HEENT: NC, Atraumatic. PERRLA, anicteric sclerae. Lungs: CTA Bilaterally. No Wheezing/Rhonchi/Rales. Heart:  S1 S2,  No murmur, No Rubs, No Gallops  Abdomen: Soft, Non distended, Non tender.  +Bowel sounds,   Extremities: Left foot great toe with open wound and exposed hardware. Psych:   Not anxious or agitated. Neurologic:  No acute neurological deficit. Vital signs/Intake and Output:  Visit Vitals  /70 (BP 1 Location: Left arm, BP Patient Position: At rest)   Pulse 69   Temp 98.8 °F (37.1 °C)   Resp 18   Ht 5' 9\" (1.753 m)   Wt 82.8 kg (182 lb 8 oz)   SpO2 98%   BMI 26.95 kg/m²     Current Shift:  02/03 0701 - 02/03 1900  In: 200 [P.O.:200]  Out: 1700 [Urine:1700]  Last three shifts:  02/01 1901 - 02/03 0700  In: 4363 [P.O.:240;  I.V.:3900]  Out: 4250 [Urine:4250]            Labs: Results:       Chemistry Recent Labs     02/03/20  0453 02/02/20  0330 02/01/20  0750   GLU 90 75 86    142 140   K 3.7 3.7 3.7    109 110   CO2 29 28 27   BUN 10 9 9   CREA 0.93 0.78 0.90   CA 8.7 8.4* 8.2*   AGAP 4 5 3   BUCR 11* 12 10*      CBC w/Diff Recent Labs     02/02/20  0330 02/01/20  0750   WBC 6.6 7.1   RBC 3.29* 3.39*   HGB 9.6* 10.0*   HCT 29.9* 30.8*   * 686*      Cardiac Enzymes No results for input(s): CPK, CKND1, JESSI in the last 72 hours. No lab exists for component: CKRMB, TROIP   Coagulation No results for input(s): PTP, INR, APTT, INREXT, INREXT in the last 72 hours. Lipid Panel No results found for: CHOL, CHOLPOCT, CHOLX, CHLST, CHOLV, 985560, HDL, HDLP, LDL, LDLC, DLDLP, 446964, VLDLC, VLDL, TGLX, TRIGL, TRIGP, TGLPOCT, CHHD, CHHDX   BNP No results for input(s): BNPP in the last 72 hours. Liver Enzymes No results for input(s): TP, ALB, TBIL, AP, SGOT, GPT in the last 72 hours.     No lab exists for component: DBIL   Thyroid Studies No results found for: T4, T3U, TSH, TSHEXT, TSHEXT     Procedures/imaging: see electronic medical records for all procedures/Xrays and details which were not copied into this note but were reviewed prior to creation of Plan

## 2020-02-04 ENCOUNTER — HOME HEALTH ADMISSION (OUTPATIENT)
Dept: HOME HEALTH SERVICES | Facility: HOME HEALTH | Age: 70
End: 2020-02-04
Payer: MEDICARE

## 2020-02-04 VITALS
DIASTOLIC BLOOD PRESSURE: 63 MMHG | TEMPERATURE: 98.3 F | HEIGHT: 69 IN | HEART RATE: 76 BPM | SYSTOLIC BLOOD PRESSURE: 121 MMHG | OXYGEN SATURATION: 94 % | RESPIRATION RATE: 14 BRPM | BODY MASS INDEX: 28.53 KG/M2 | WEIGHT: 192.6 LBS

## 2020-02-04 LAB
ANION GAP SERPL CALC-SCNC: 4 MMOL/L (ref 3–18)
BUN SERPL-MCNC: 8 MG/DL (ref 7–18)
BUN/CREAT SERPL: 11 (ref 12–20)
CALCIUM SERPL-MCNC: 9 MG/DL (ref 8.5–10.1)
CHLORIDE SERPL-SCNC: 109 MMOL/L (ref 100–111)
CO2 SERPL-SCNC: 28 MMOL/L (ref 21–32)
CREAT SERPL-MCNC: 0.74 MG/DL (ref 0.6–1.3)
GLUCOSE SERPL-MCNC: 78 MG/DL (ref 74–99)
POTASSIUM SERPL-SCNC: 3.7 MMOL/L (ref 3.5–5.5)
SODIUM SERPL-SCNC: 141 MMOL/L (ref 136–145)

## 2020-02-04 PROCEDURE — 80048 BASIC METABOLIC PNL TOTAL CA: CPT

## 2020-02-04 PROCEDURE — 74011250636 HC RX REV CODE- 250/636: Performed by: PODIATRIST

## 2020-02-04 PROCEDURE — 74011250636 HC RX REV CODE- 250/636: Performed by: INTERNAL MEDICINE

## 2020-02-04 PROCEDURE — 74011250637 HC RX REV CODE- 250/637: Performed by: HOSPITALIST

## 2020-02-04 PROCEDURE — 74011250637 HC RX REV CODE- 250/637: Performed by: SURGERY

## 2020-02-04 PROCEDURE — 74011000258 HC RX REV CODE- 258: Performed by: INTERNAL MEDICINE

## 2020-02-04 PROCEDURE — 74011250636 HC RX REV CODE- 250/636: Performed by: ANESTHESIOLOGY

## 2020-02-04 PROCEDURE — 74011250637 HC RX REV CODE- 250/637: Performed by: PODIATRIST

## 2020-02-04 RX ADMIN — Medication 10 ML: at 01:33

## 2020-02-04 RX ADMIN — MONTELUKAST 10 MG: 10 TABLET, FILM COATED ORAL at 11:34

## 2020-02-04 RX ADMIN — OXYCODONE HYDROCHLORIDE AND ACETAMINOPHEN 1 TABLET: 7.5; 325 TABLET ORAL at 07:45

## 2020-02-04 RX ADMIN — ALLOPURINOL 100 MG: 100 TABLET ORAL at 11:31

## 2020-02-04 RX ADMIN — METOPROLOL TARTRATE 25 MG: 25 TABLET ORAL at 11:33

## 2020-02-04 RX ADMIN — LORATADINE 10 MG: 10 TABLET ORAL at 11:33

## 2020-02-04 RX ADMIN — OXYCODONE HYDROCHLORIDE AND ACETAMINOPHEN 1 TABLET: 7.5; 325 TABLET ORAL at 01:30

## 2020-02-04 RX ADMIN — HYDROMORPHONE HYDROCHLORIDE 1 MG: 1 INJECTION, SOLUTION INTRAMUSCULAR; INTRAVENOUS; SUBCUTANEOUS at 11:50

## 2020-02-04 RX ADMIN — AMLODIPINE BESYLATE 10 MG: 5 TABLET ORAL at 11:31

## 2020-02-04 RX ADMIN — SERTRALINE HYDROCHLORIDE 100 MG: 50 TABLET ORAL at 11:34

## 2020-02-04 RX ADMIN — COLLAGENASE SANTYL: 250 OINTMENT TOPICAL at 11:32

## 2020-02-04 RX ADMIN — Medication 10 ML: at 06:00

## 2020-02-04 RX ADMIN — CALCIUM 500 MG: 500 TABLET ORAL at 07:38

## 2020-02-04 RX ADMIN — TAMSULOSIN HYDROCHLORIDE 0.4 MG: 0.4 CAPSULE ORAL at 11:34

## 2020-02-04 RX ADMIN — SODIUM CHLORIDE, SODIUM LACTATE, POTASSIUM CHLORIDE, AND CALCIUM CHLORIDE 125 ML/HR: 600; 310; 30; 20 INJECTION, SOLUTION INTRAVENOUS at 11:55

## 2020-02-04 RX ADMIN — LISINOPRIL 5 MG: 5 TABLET ORAL at 11:33

## 2020-02-04 RX ADMIN — CHOLECALCIFEROL TAB 10 MCG (400 UNIT) 1 TABLET: 10 TAB at 11:32

## 2020-02-04 RX ADMIN — ERTAPENEM SODIUM 1 G: 1 INJECTION, POWDER, LYOPHILIZED, FOR SOLUTION INTRAMUSCULAR; INTRAVENOUS at 11:54

## 2020-02-04 RX ADMIN — PANTOPRAZOLE SODIUM 40 MG: 40 TABLET, DELAYED RELEASE ORAL at 07:39

## 2020-02-04 RX ADMIN — Medication 81 MG: at 11:32

## 2020-02-04 RX ADMIN — CLOPIDOGREL BISULFATE 75 MG: 75 TABLET ORAL at 11:32

## 2020-02-04 RX ADMIN — Medication 1 CAPSULE: at 11:33

## 2020-02-04 NOTE — PROGRESS NOTES
Hospitalist Progress Note    Patient: Claire Flores MRN: [id number]  CSN: [id number]    YOB: 1950  Age: 77 y.o. Sex: male    DOA: 1/27/2020 LOS:  LOS: 8 days                Assessment/Plan     Patient Active Problem List   Diagnosis Code    Enlarged prostate with lower urinary tract symptoms (LUTS) N40.1    Calculus of both kidneys N20.0    Rheumatoid arthritis ([facility] M06.9    Essential hypertension I10    GERD (gastroesophageal reflux disease) K21.9    Disorder of bone and cartilage, unspecified M89.9, M94.9    Depression F32.9    Aortic stenosis I35.0    Gout M10.9    Impaired fasting blood sugar R73.01    Male erectile dysfunction, unspecified N52.9    Anxiety state F41.1    Chronic allergic rhinitis J30.9    History of aortic valve replacement Z95.2    Deviated nasal septum J34.2    Chronic maxillary sinusitis J32.0    Multiple-type hyperlipidemia E78.2    SHAILESH (obstructive sleep apnea) G47.33    Sensorineural hearing loss (SNHL) of both ears H90.3    Cellulitis L03.90    Cellulitis of foot, left L03. 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 7XXA            Patient admitted by podiatry service,     Left foot cellulitis/wound dehiscence/exposed hardware-  Started on vancomycin and Zosyn, follow wound cultures. Seen by plastic surgery, ID, vascular  S/p Abdominal aortogram; bilateral lower extremity arteriogram, fourth-order cannulation of left posterior tibial artery; orbital atherectomy, left posterior tibial artery; percutaneous transluminal angioplasty, left posterior tibial artery. On 01/30/2020.   Antibiotics per ID    S/p I&D left foot with excisional debridement.     Hypertension-  Continue with home medications monitor blood pressure.     History of rheumatoid arthritis-  He is on Enbrel at home which was stopped because of wound healing. We will continue to hold Enbrel at this point.     History of aortic valve replacement-  Bovine not on any anticoagulation.     GERD-  Continue with PPI     Gout-  Continue with allopurinol.     BPH-  Continue with Flomax     DVT prophylaxis with Lovenox    Follow up with podiatry, ID, PCP    Disposition : per primary    Review of systems  General: No fevers or chills. Cardiovascular: No chest pain or pressure. No palpitations. Pulmonary: No shortness of breath. Gastrointestinal: No nausea, vomiting. Physical Exam:  General: Awake, cooperative, no acute distress    HEENT: NC, Atraumatic. PERRLA, anicteric sclerae. Lungs: CTA Bilaterally. No Wheezing/Rhonchi/Rales. Heart:  S1 S2,  No murmur, No Rubs, No Gallops  Abdomen: Soft, Non distended, Non tender.  +Bowel sounds,   Extremities: Left foot great toe with open wound and exposed hardware. Psych:   Not anxious or agitated. Neurologic:  No acute neurological deficit. Vital signs/Intake and Output:  Visit Vitals  /63 (BP 1 Location: Left arm, BP Patient Position: At rest;Supine)   Pulse 76   Temp 98.3 °F (36.8 °C)   Resp 14   Ht 5' 9\" (1.753 m)   Wt 87.4 kg (192 lb 9.6 oz)   SpO2 94%   BMI 28.44 kg/m²     Current Shift:  02/04 0701 - 02/04 1900  In: -   Out: 550 [Urine:550]  Last three shifts:  02/02 1901 - 02/04 0700  In: 1700 [P.O.:200;  I.V.:1500]  Out: 4225 [Urine:4225]            Labs: Results:       Chemistry Recent Labs     02/04/20  0450 02/03/20  0453 02/02/20  0330   GLU 78 90 75    139 142   K 3.7 3.7 3.7    106 109   CO2 28 29 28   BUN 8 10 9   CREA 0.74 0.93 0.78   CA 9.0 8.7 8.4*   AGAP 4 4 5   BUCR 11* 11* 12      CBC w/Diff Recent Labs     02/02/20  0330   WBC 6.6   RBC 3.29*   HGB 9.6*   HCT 29.9*   *      Cardiac Enzymes No results for input(s): CPK, CKND1, JESSI in the last 72 hours. No lab exists for component: CKRMB, TROIP   Coagulation No results for input(s): PTP, INR, APTT, INREXT, INREXT in the last 72 hours. Lipid Panel No results found for: CHOL, CHOLPOCT, CHOLX, CHLST, CHOLV, 164998, HDL, HDLP, LDL, LDLC, DLDLP, 131407, VLDLC, VLDL, TGLX, TRIGL, TRIGP, TGLPOCT, CHHD, CHHDX   BNP No results for input(s): BNPP in the last 72 hours. Liver Enzymes No results for input(s): TP, ALB, TBIL, AP, SGOT, GPT in the last 72 hours.     No lab exists for component: DBIL   Thyroid Studies No results found for: T4, T3U, TSH, TSHEXT, TSHEXT     Procedures/imaging: see electronic medical records for all procedures/Xrays and details which were not copied into this note but were reviewed prior to creation of Plan

## 2020-02-04 NOTE — PROGRESS NOTES
1912  Bedside and Verbal shift change report given by BARRETT BustosRN (off going nurse) to Manjit Winslow RN (on coming). Report included the following information SBAR, Kardex, OR Summary, Intake/Output and MAR.     2120  Pt rt elbow has a small skin tear with a scant of blood drainage, covered it with a mepilex. LT elbow has a old scab with  slight drainage, covered it with a band-Aide. 1104  Bedside and Verbal shift change report given to Funmilayo Brizuela (on coming nurse) by Manjit Winslow RN (off going). Report included the following information SBAR, Kardex, OR Summary, Intake/Output and MAR.

## 2020-02-04 NOTE — ROUTINE PROCESS
Bedside shift change report given to Pau Bowie RN (oncoming nurse) by Wayne Kahn RN (offgoing nurse). Report included the following information SBAR, Kardex, Intake/Output, MAR and Recent Results.

## 2020-02-04 NOTE — PROGRESS NOTES
0740  Bedside and Verbal shift change report given to Vernoika Moreno RN (oncoming nurse) by Lexi Benedict RN (offgoing nurse). Report included the following information SBAR, Kardex, Intake/Output, MAR and Recent Results. 1800  Pt has been discharged. Wound clinic visited pt before discharge. Instructions and questions have been addressed.

## 2020-02-04 NOTE — PROGRESS NOTES
Problem: Pain  Goal: *Control of Pain  Outcome: Progressing Towards Goal     Problem: Falls - Risk of  Goal: *Absence of Falls  Description  Document Donnie Diaz Fall Risk and appropriate interventions in the flowsheet.   Outcome: Progressing Towards Goal  Note: Fall Risk Interventions:  Mobility Interventions: Patient to call before getting OOB         Medication Interventions: Patient to call before getting OOB, Teach patient to arise slowly    Elimination Interventions: Call light in reach, Patient to call for help with toileting needs, Urinal in reach              Problem: Cellulitis Care Plan (Adult)  Goal: *Absence of infection signs and symptoms  Outcome: Progressing Towards Goal

## 2020-02-04 NOTE — PROGRESS NOTES
INITIAL NUTRITION ASSESSMENT     RECOMMENDATIONS/PLAN:   Other: Continue w/ POC  Monitor labs/lytes, PO intakes, skin integrity, wt, fluid status, BM    REASON FOR ASSESSMENT:     [x] LOS    NUTRITION ASSESSMENT:   Client History: 77 yrs old Male admitted with  Left foot cellulitis/wound dehiscence/exposed hardware-S/p Abdominal aortogram; bilateral lower extremity arteriogram, fourth-order cannulation of left posterior tibial artery; orbital atherectomy, left posterior tibial artery; percutaneous transluminal angioplasty, left posterior tibial artery, HTN, s/p I&D left foot w/ excisional debridement.  Wound care following      PMHx: anxiety, aortic stenosis, arthritis, BPH, depression, GERD, gout, HTN, renal calculi    Cultural/Church Food Preferences: None Identified    FOOD/NUTRITION HISTORY  Diet History: per nurse pt has been eating well during hospitalization   Food Allergies:  [x] NKFA       Pertinent PTA Medications: lopressor, MVI, caltrex, fish oil, vit d3    NUTRITION INTAKE   Diet Order:  Regular     Average PO Intake:       Patient Vitals for the past 100 hrs:   % Diet Eaten   02/03/20 0908 100 %   02/02/20 0913 100 %      Pertinent Medications:  [x] Reviewed; os-amy, vit d3, fish oil, lopressor, protonix,   Electrolyte Replacement Protocol: []K  []Mg  []PO4    Insulin:  [] SSI  [] Pre-meal   []  Basal   [] Drip  [] None  Pt expected to meet estimated nutrient needs through next review:          [x]  Yes     [] No;  ANTHROPOMETRICS  Height: 5' 9\" (175.3 cm)       Weight: 87.4 kg (192 lb 9.6 oz)    BMI: 28.4 kg/m^2  -  overweight (25.0%-29.9% BMI)        Weight change: no wt loss per chart review                                  Comparison to Reference Standards:  IBW:160 lbs      %IBW: 120%      AdjBW: 76.4 kg    NUTRITION-FOCUSED PHYSICAL ASSESSMENT  Skin: Left foot cellulitis/wound dehiscence/exposed hardware     GI: +BM 2/3/20    BIOCHEMICAL DATA & MEDICAL TESTS  Pertinent Labs:  [x] Reviewed; NUTRITION PRESCRIPTION  Calories: 2185 kcal/day based on [place] x 1.4  Protein:  g/day based on 1.2-1.5 g/kg of AdjBW  Fluid: 2185 ml/day based on 1 kcal/ml      NUTRITION DIAGNOSES:   1. Increased estimated protein needs related to Left foot cellulitis/wound dehiscence/exposed hardware as evidence by estimated needs 92-115g/day of protein     NUTRITION INTERVENTIONS:   INTERVENTIONS:        GOALS:  1. Other: Continue w/ POC 1. Encourage protein intake at all meals by next review 7 days     LEARNING NEEDS (Diet, Supplementation, Food/Nutrient-Drug Interaction):   [x] None Identified  [] Inpatient education provided/documented    [] Identified and patient:  [] Declined     [] Was not appropriate/indicated  NUTRITION MONITORING /EVALUATION:   Follow PO intake  Monitor wt  Monitor renal labs, electrolytes, fluid status    [] Participated in Interdisciplinary Rounds  [x] [address] [place] [address] Reviewed/Documented  DISCHARGE NUTRITION RECOMMENDATIONS ADDRESSED:     [x] Yes- recommended Regular diet     NUTRITION RISK:     []  At risk                     [x]  Not currently at risk     Will follow-up per policy.   Luther Campbell 1

## 2020-02-05 NOTE — WOUND CARE
Wound care into patients room to apply activac for home use. Patient was explained the service agreement. He signed and was given a copy. Wound vac dressing remained intact, wound vac connections changed. Simply instructions given. All questions/concerns addressed. All wound vac supplies given to patient's sister for home health use. Assistance was given for application of clothes. IV fluid was discontinued. PICC line ports were clamped, capped and wrapped in clean sterile gauze and tucked into stockinet. Patient was placed in wheelchair. Staff floor nurse notified and patient was wheeled out main entrance of hospital. Assisted patient into vehicle.

## 2020-02-06 ENCOUNTER — HOME CARE VISIT (OUTPATIENT)
Dept: SCHEDULING | Facility: HOME HEALTH | Age: 70
End: 2020-02-06
Payer: MEDICARE

## 2020-02-06 ENCOUNTER — HOME CARE VISIT (OUTPATIENT)
Dept: HOME HEALTH SERVICES | Facility: HOME HEALTH | Age: 70
End: 2020-02-06

## 2020-02-06 VITALS
DIASTOLIC BLOOD PRESSURE: 74 MMHG | HEIGHT: 69 IN | RESPIRATION RATE: 16 BRPM | SYSTOLIC BLOOD PRESSURE: 132 MMHG | OXYGEN SATURATION: 97 % | WEIGHT: 174 LBS | TEMPERATURE: 98.6 F | BODY MASS INDEX: 25.77 KG/M2 | HEART RATE: 94 BPM

## 2020-02-06 PROCEDURE — 3331090002 HH PPS REVENUE DEBIT

## 2020-02-06 PROCEDURE — 3331090001 HH PPS REVENUE CREDIT

## 2020-02-06 PROCEDURE — G0299 HHS/HOSPICE OF RN EA 15 MIN: HCPCS

## 2020-02-06 PROCEDURE — 400013 HH SOC

## 2020-02-07 ENCOUNTER — HOME CARE VISIT (OUTPATIENT)
Dept: HOME HEALTH SERVICES | Facility: HOME HEALTH | Age: 70
End: 2020-02-07
Payer: MEDICARE

## 2020-02-07 PROCEDURE — 3331090001 HH PPS REVENUE CREDIT

## 2020-02-07 PROCEDURE — 3331090002 HH PPS REVENUE DEBIT

## 2020-02-08 ENCOUNTER — HOME CARE VISIT (OUTPATIENT)
Dept: SCHEDULING | Facility: HOME HEALTH | Age: 70
End: 2020-02-08
Payer: MEDICARE

## 2020-02-08 PROCEDURE — 3331090001 HH PPS REVENUE CREDIT

## 2020-02-08 PROCEDURE — 3331090002 HH PPS REVENUE DEBIT

## 2020-02-08 PROCEDURE — G0299 HHS/HOSPICE OF RN EA 15 MIN: HCPCS

## 2020-02-09 PROCEDURE — 3331090001 HH PPS REVENUE CREDIT

## 2020-02-09 PROCEDURE — 3331090002 HH PPS REVENUE DEBIT

## 2020-02-10 VITALS
RESPIRATION RATE: 17 BRPM | TEMPERATURE: 98.2 F | HEART RATE: 82 BPM | DIASTOLIC BLOOD PRESSURE: 74 MMHG | SYSTOLIC BLOOD PRESSURE: 128 MMHG | OXYGEN SATURATION: 97 %

## 2020-02-10 PROCEDURE — 3331090002 HH PPS REVENUE DEBIT

## 2020-02-10 PROCEDURE — 3331090001 HH PPS REVENUE CREDIT

## 2020-02-11 ENCOUNTER — HOME CARE VISIT (OUTPATIENT)
Dept: SCHEDULING | Facility: HOME HEALTH | Age: 70
End: 2020-02-11
Payer: MEDICARE

## 2020-02-11 PROCEDURE — 3331090002 HH PPS REVENUE DEBIT

## 2020-02-11 PROCEDURE — G0299 HHS/HOSPICE OF RN EA 15 MIN: HCPCS

## 2020-02-11 PROCEDURE — 3331090001 HH PPS REVENUE CREDIT

## 2020-02-12 ENCOUNTER — HOSPITAL ENCOUNTER (OUTPATIENT)
Dept: WOUND CARE | Age: 70
Discharge: HOME | End: 2020-02-12
Attending: INTERNAL MEDICINE
Payer: MEDICARE

## 2020-02-12 VITALS
RESPIRATION RATE: 18 BRPM | DIASTOLIC BLOOD PRESSURE: 82 MMHG | HEART RATE: 72 BPM | SYSTOLIC BLOOD PRESSURE: 143 MMHG | TEMPERATURE: 97.6 F | OXYGEN SATURATION: 100 %

## 2020-02-12 VITALS
DIASTOLIC BLOOD PRESSURE: 78 MMHG | TEMPERATURE: 98.6 F | RESPIRATION RATE: 16 BRPM | HEART RATE: 88 BPM | SYSTOLIC BLOOD PRESSURE: 140 MMHG | OXYGEN SATURATION: 98 %

## 2020-02-12 PROCEDURE — 99211 OFF/OP EST MAY X REQ PHY/QHP: CPT

## 2020-02-12 PROCEDURE — 3331090001 HH PPS REVENUE CREDIT

## 2020-02-12 PROCEDURE — 3331090002 HH PPS REVENUE DEBIT

## 2020-02-12 NOTE — WOUND CARE
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  No f/s/c    Last seen in hosp 2/3. Interval History:  CC:  No pain  Since I last saw him in hosp, he has been receiving his infusions at [facility]. Doing well with them. Sees Dr Logan Burrell weekly on Mondays, who is pleased with appearance and x-rays. Current Antimicrobials: Prior Antimicrobials   1. Vanco IV (1/27-)   2. etapenem IV (2/3-)  1. Pip/tzb IV (1/27-2/3) #7       Assessment Plan:   L Great Toe hardware infection  1/27:  CRP - 41mg/L  2/3:   CRP - 37mg/L  2/10:  CRP - 12mg/L    Better. With the retained hardware, will likely need life-long suppression (chanelle with RA meds) PO. ->POD#12    RTC 4wks for final IV visit, and transition to PO.    PAD    RA    HTN      Microbiology:                1/27 - Wound (+) E coli (S-all) and CoNS        Lines / Catheters:         R PICC       Patient Active Problem List   Diagnosis Code    Enlarged prostate with lower urinary tract symptoms (LUTS) N40.1    Calculus of both kidneys N20.0    Rheumatoid arthritis ([address] M06.9    Essential hypertension I10    GERD (gastroesophageal reflux disease) K21.9    Disorder of bone and cartilage, unspecified M89.9, M94.9    Depression F32.9    Aortic stenosis I35.0    Gout M10.9    Impaired fasting blood sugar R73.01    Male erectile dysfunction, unspecified N52.9    Anxiety state F41.1    Chronic allergic rhinitis J30.9    History of aortic valve replacement Z95.2    Deviated nasal septum J34.2    Chronic maxillary sinusitis J32.0  Multiple-type hyperlipidemia E78.2    SHAILESH (obstructive sleep apnea) G47.33    Sensorineural hearing loss (SNHL) of both ears H90.3    Cellulitis L03.90    Cellulitis of foot, left L03. 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 7XXA       Current Outpatient Medications   Medication Sig Dispense    FOLIC ACID PO Take 862 mcg by mouth daily.  vancomycin in 0.9% Sodium Cl (VANCOCIN) 2 gram/500 mL soln 500 mL by IntraVENous route every twenty-four (24) hours for 38 days. Indications: infection of bone, M86.172 87440 mL    cephALEXin (KEFLEX) 500 mg capsule Take 500 mg by mouth four (4) times daily. Indications: Pt is not sure of correct dose     aspirin delayed-release 81 mg tablet Take 325 mg by mouth daily.  lisinopril (PRINIVIL, ZESTRIL) 5 mg tablet Take 5 mg by mouth daily. Take once in the Morning     atorvastatin (LIPITOR) 80 mg tablet Take 80 mg by mouth nightly. Take once in the Evening     levocetirizine (XYZAL) 5 mg tablet Take 5 mg by mouth daily. Take once in the Evening     montelukast (SINGULAIR) 10 mg tablet Take 10 mg by mouth daily. Take once in the morning     tamsulosin (FLOMAX) 0.4 mg capsule Take 1 Cap by mouth daily. 1 Cap    amLODIPine (NORVASC) 10 mg tablet Take 10 mg by mouth daily.  omeprazole (PRILOSEC) 10 mg capsule Take 20 mg by mouth daily.  sertraline (ZOLOFT) 50 mg tablet Take 100 mg by mouth daily.  ibuprofen (MOTRIN) 800 mg tablet Take 800 mg by mouth every eight (8) hours as needed for Pain. Indications: pain     allopurinol (ZYLOPRIM) 100 mg tablet Take 300 mg by mouth daily.  metoprolol tartrate (LOPRESSOR) 25 mg tablet Take 25 mg by mouth daily.  Once a day in the morning     multivitamin (ONE A DAY) tablet Take 1 Tab by Mouth Once a Day.  calcium carbonate (CALTREX) 600 mg (1,500 mg) tablet Take by Mouth Once a Day.  omega 3-dha-epa-fish oil (FISH OIL) 60- mg cap Take 500 mg by mouth daily.  cholecalciferol, vitamin d3, (VITAMIN D3) 400 unit cap Take 2,000 Units by mouth daily. No current facility-administered medications for this encounter. Review of Systems - General ROS: negative for - chills, fever or night sweats  Respiratory ROS: no cough, shortness of breath, or wheezing  Cardiovascular ROS: no chest pain or dyspnea on exertion  Gastrointestinal ROS: no abdominal pain, change in bowel habits, or black or bloody stools       Objective: There were no vitals taken for this visit. No data recorded. GEN: WDWN WM in NAD on knee scooter  HEENT: anicteric  CHEST: CTA  CVS:RRR  R PICC: non-inlamed  EXT: Boot on L foot       Lab results:    Chemistry  No results for input(s): GLU, NA, K, CL, CO2, BUN, CREA, CA, AGAP, BUCR, TBIL, GPT, AP, TP, ALB, GLOB, AGRAT in the last 72 hours. CBC w/ Diff  No results for input(s): WBC, RBC, HGB, HCT, PLT, GRANS, LYMPH, EOS, HGBEXT, HCTEXT, PLTEXT in the last 72 hours.     Microbiology  All Micro Results     None           Cheril Hodgkin, MD  Cell [phone]  [facility]   2/12/2020   9:08 AM

## 2020-02-13 PROCEDURE — 3331090002 HH PPS REVENUE DEBIT

## 2020-02-13 PROCEDURE — 3331090001 HH PPS REVENUE CREDIT

## 2020-02-14 PROCEDURE — 3331090001 HH PPS REVENUE CREDIT

## 2020-02-14 PROCEDURE — 3331090002 HH PPS REVENUE DEBIT

## 2020-02-15 ENCOUNTER — HOME CARE VISIT (OUTPATIENT)
Dept: SCHEDULING | Facility: HOME HEALTH | Age: 70
End: 2020-02-15
Payer: MEDICARE

## 2020-02-15 PROCEDURE — 3331090002 HH PPS REVENUE DEBIT

## 2020-02-15 PROCEDURE — G0299 HHS/HOSPICE OF RN EA 15 MIN: HCPCS

## 2020-02-15 PROCEDURE — 3331090001 HH PPS REVENUE CREDIT

## 2020-02-16 VITALS
RESPIRATION RATE: 14 BRPM | HEART RATE: 73 BPM | DIASTOLIC BLOOD PRESSURE: 78 MMHG | TEMPERATURE: 98 F | SYSTOLIC BLOOD PRESSURE: 119 MMHG | OXYGEN SATURATION: 98 %

## 2020-02-16 PROCEDURE — 3331090002 HH PPS REVENUE DEBIT

## 2020-02-16 PROCEDURE — 3331090001 HH PPS REVENUE CREDIT

## 2020-02-17 PROCEDURE — 3331090001 HH PPS REVENUE CREDIT

## 2020-02-17 PROCEDURE — 3331090002 HH PPS REVENUE DEBIT

## 2020-02-18 ENCOUNTER — HOME CARE VISIT (OUTPATIENT)
Dept: SCHEDULING | Facility: HOME HEALTH | Age: 70
End: 2020-02-18
Payer: MEDICARE

## 2020-02-18 VITALS
DIASTOLIC BLOOD PRESSURE: 70 MMHG | RESPIRATION RATE: 16 BRPM | HEART RATE: 98 BPM | OXYGEN SATURATION: 98 % | SYSTOLIC BLOOD PRESSURE: 128 MMHG

## 2020-02-18 PROCEDURE — G0299 HHS/HOSPICE OF RN EA 15 MIN: HCPCS

## 2020-02-18 PROCEDURE — 3331090002 HH PPS REVENUE DEBIT

## 2020-02-18 PROCEDURE — 3331090001 HH PPS REVENUE CREDIT

## 2020-02-19 PROCEDURE — 3331090002 HH PPS REVENUE DEBIT

## 2020-02-19 PROCEDURE — 3331090001 HH PPS REVENUE CREDIT

## 2020-02-20 PROCEDURE — 3331090002 HH PPS REVENUE DEBIT

## 2020-02-20 PROCEDURE — 3331090001 HH PPS REVENUE CREDIT

## 2020-02-21 ENCOUNTER — HOME CARE VISIT (OUTPATIENT)
Dept: SCHEDULING | Facility: HOME HEALTH | Age: 70
End: 2020-02-21
Payer: MEDICARE

## 2020-02-21 VITALS
RESPIRATION RATE: 16 BRPM | OXYGEN SATURATION: 98 % | TEMPERATURE: 98.6 F | DIASTOLIC BLOOD PRESSURE: 68 MMHG | SYSTOLIC BLOOD PRESSURE: 122 MMHG | HEART RATE: 75 BPM

## 2020-02-21 PROCEDURE — 3331090001 HH PPS REVENUE CREDIT

## 2020-02-21 PROCEDURE — G0299 HHS/HOSPICE OF RN EA 15 MIN: HCPCS

## 2020-02-21 PROCEDURE — 3331090002 HH PPS REVENUE DEBIT

## 2020-02-22 PROCEDURE — 3331090001 HH PPS REVENUE CREDIT

## 2020-02-22 PROCEDURE — 3331090002 HH PPS REVENUE DEBIT

## 2020-02-23 PROCEDURE — 3331090002 HH PPS REVENUE DEBIT

## 2020-02-23 PROCEDURE — 3331090001 HH PPS REVENUE CREDIT

## 2020-02-24 PROCEDURE — 3331090002 HH PPS REVENUE DEBIT

## 2020-02-24 PROCEDURE — 3331090001 HH PPS REVENUE CREDIT

## 2020-02-25 ENCOUNTER — HOME CARE VISIT (OUTPATIENT)
Dept: SCHEDULING | Facility: HOME HEALTH | Age: 70
End: 2020-02-25
Payer: MEDICARE

## 2020-02-25 PROCEDURE — 3331090002 HH PPS REVENUE DEBIT

## 2020-02-25 PROCEDURE — 3331090001 HH PPS REVENUE CREDIT

## 2020-02-25 PROCEDURE — G0299 HHS/HOSPICE OF RN EA 15 MIN: HCPCS

## 2020-02-26 VITALS
TEMPERATURE: 98.7 F | RESPIRATION RATE: 14 BRPM | SYSTOLIC BLOOD PRESSURE: 130 MMHG | HEART RATE: 71 BPM | DIASTOLIC BLOOD PRESSURE: 66 MMHG | OXYGEN SATURATION: 98 %

## 2020-02-26 PROCEDURE — 3331090002 HH PPS REVENUE DEBIT

## 2020-02-26 PROCEDURE — 3331090001 HH PPS REVENUE CREDIT

## 2020-02-27 PROCEDURE — 3331090001 HH PPS REVENUE CREDIT

## 2020-02-27 PROCEDURE — 3331090002 HH PPS REVENUE DEBIT

## 2020-02-28 ENCOUNTER — HOME CARE VISIT (OUTPATIENT)
Dept: SCHEDULING | Facility: HOME HEALTH | Age: 70
End: 2020-02-28
Payer: MEDICARE

## 2020-02-28 PROCEDURE — G0299 HHS/HOSPICE OF RN EA 15 MIN: HCPCS

## 2020-02-28 PROCEDURE — 3331090002 HH PPS REVENUE DEBIT

## 2020-02-28 PROCEDURE — 3331090001 HH PPS REVENUE CREDIT

## 2020-02-29 PROCEDURE — 3331090001 HH PPS REVENUE CREDIT

## 2020-02-29 PROCEDURE — 3331090002 HH PPS REVENUE DEBIT

## 2020-03-01 VITALS
HEART RATE: 81 BPM | DIASTOLIC BLOOD PRESSURE: 78 MMHG | TEMPERATURE: 99.1 F | SYSTOLIC BLOOD PRESSURE: 130 MMHG | OXYGEN SATURATION: 97 % | RESPIRATION RATE: 14 BRPM

## 2020-03-01 PROCEDURE — 3331090001 HH PPS REVENUE CREDIT

## 2020-03-01 PROCEDURE — 3331090002 HH PPS REVENUE DEBIT

## 2020-03-02 PROCEDURE — 3331090002 HH PPS REVENUE DEBIT

## 2020-03-02 PROCEDURE — 3331090001 HH PPS REVENUE CREDIT

## 2020-03-03 ENCOUNTER — HOME CARE VISIT (OUTPATIENT)
Dept: SCHEDULING | Facility: HOME HEALTH | Age: 70
End: 2020-03-03
Payer: MEDICARE

## 2020-03-03 PROCEDURE — 3331090001 HH PPS REVENUE CREDIT

## 2020-03-03 PROCEDURE — 3331090002 HH PPS REVENUE DEBIT

## 2020-03-03 PROCEDURE — G0299 HHS/HOSPICE OF RN EA 15 MIN: HCPCS

## 2020-03-04 VITALS
SYSTOLIC BLOOD PRESSURE: 120 MMHG | OXYGEN SATURATION: 98 % | RESPIRATION RATE: 14 BRPM | TEMPERATURE: 98.2 F | DIASTOLIC BLOOD PRESSURE: 70 MMHG | HEART RATE: 64 BPM

## 2020-03-04 PROCEDURE — 3331090002 HH PPS REVENUE DEBIT

## 2020-03-04 PROCEDURE — 3331090001 HH PPS REVENUE CREDIT

## 2020-03-05 PROCEDURE — 3331090002 HH PPS REVENUE DEBIT

## 2020-03-05 PROCEDURE — 3331090001 HH PPS REVENUE CREDIT

## 2020-03-06 ENCOUNTER — HOME CARE VISIT (OUTPATIENT)
Dept: SCHEDULING | Facility: HOME HEALTH | Age: 70
End: 2020-03-06
Payer: MEDICARE

## 2020-03-06 PROCEDURE — 3331090002 HH PPS REVENUE DEBIT

## 2020-03-06 PROCEDURE — 3331090001 HH PPS REVENUE CREDIT

## 2020-03-06 PROCEDURE — G0299 HHS/HOSPICE OF RN EA 15 MIN: HCPCS

## 2020-03-07 VITALS
TEMPERATURE: 98 F | OXYGEN SATURATION: 100 % | SYSTOLIC BLOOD PRESSURE: 130 MMHG | RESPIRATION RATE: 14 BRPM | HEART RATE: 74 BPM | DIASTOLIC BLOOD PRESSURE: 64 MMHG

## 2020-03-07 PROCEDURE — 3331090002 HH PPS REVENUE DEBIT

## 2020-03-07 PROCEDURE — 3331090001 HH PPS REVENUE CREDIT

## 2020-03-08 PROCEDURE — 3331090002 HH PPS REVENUE DEBIT

## 2020-03-08 PROCEDURE — 3331090001 HH PPS REVENUE CREDIT

## 2020-03-09 PROCEDURE — 3331090001 HH PPS REVENUE CREDIT

## 2020-03-09 PROCEDURE — 3331090002 HH PPS REVENUE DEBIT

## 2020-03-10 PROCEDURE — 3331090002 HH PPS REVENUE DEBIT

## 2020-03-10 PROCEDURE — 3331090001 HH PPS REVENUE CREDIT

## 2020-03-11 ENCOUNTER — HOSPITAL ENCOUNTER (OUTPATIENT)
Dept: WOUND CARE | Age: 70
Discharge: HOME | End: 2020-03-11
Attending: INTERNAL MEDICINE
Payer: MEDICARE

## 2020-03-11 ENCOUNTER — HOME CARE VISIT (OUTPATIENT)
Dept: HOME HEALTH SERVICES | Facility: HOME HEALTH | Age: 70
End: 2020-03-11
Payer: MEDICARE

## 2020-03-11 VITALS
DIASTOLIC BLOOD PRESSURE: 70 MMHG | OXYGEN SATURATION: 98 % | SYSTOLIC BLOOD PRESSURE: 134 MMHG | TEMPERATURE: 97.6 F | RESPIRATION RATE: 18 BRPM | HEART RATE: 65 BPM

## 2020-03-11 PROCEDURE — 99211 OFF/OP EST MAY X REQ PHY/QHP: CPT

## 2020-03-11 PROCEDURE — 3331090001 HH PPS REVENUE CREDIT

## 2020-03-11 PROCEDURE — 3331090002 HH PPS REVENUE DEBIT

## 2020-03-11 NOTE — WOUND CARE
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  No f/s/c     Last seen in clinic  2/12. Interval History:  CC: Better  Since I last saw him, he has been seen by Dr Dwight Owen         Current Antimicrobials: Prior Antimicrobials   1. Vanco IV (1/27-) #40  2. etapenem IV (2/3-) #40 1. Pip/tzb IV (1/27-2/3) #7       Assessment Plan:   L Great Toe hardware infection  1/27:  CRP - 41mg/L  2/3:   CRP - 37mg/L  2/10:  CRP - 12mg/L  2/17:  CRP - 5mg/L  2/24:  CRP - 5mg/L  3/2:    CRP - 7mg/L  3/9:    CRP - 5mg/L    Better. Time to transition to life-long suppressive PO course given hardware retention in Great Toe. No Rifampin. Both recovered bacteria sensitive to tmp-smx. ->POD/abx #40    Finish the IV portion tomorrow and then begin PO TMP-SMX DS ONCE daily forever. Will see back in 1m for follow-up.     I called in Rx to [facility] ([phone])   PAD    RA    HTN      Microbiology:                1/27 - Wound (+) E coli (S-all) and CoNS        Lines / Catheters:         R PICC      Patient Active Problem List   Diagnosis Code    Enlarged prostate with lower urinary tract symptoms (LUTS) N40.1    Calculus of both kidneys N20.0    Rheumatoid arthritis ([address] M06.9    Essential hypertension I10    GERD (gastroesophageal reflux disease) K21.9    Disorder of bone and cartilage, unspecified M89.9, M94.9    Depression F32.9    Aortic stenosis I35.0    Gout M10.9    Impaired fasting blood sugar R73.01    Male erectile dysfunction, unspecified N52.9    Anxiety state F41.1    Chronic allergic rhinitis J30.9    History of aortic valve replacement Z95.2    Deviated nasal septum J34.2    Chronic maxillary sinusitis J32.0    Multiple-type hyperlipidemia E78.2    SHAILESH (obstructive sleep apnea) G47.33    Sensorineural hearing loss (SNHL) of both ears H90.3    Cellulitis L03.90    Cellulitis of foot, left L03. 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 7XXA       Current Outpatient Medications   Medication Sig Dispense    calcium carbonate (CALTREX) 600 mg calcium (1,500 mg) tablet Take 1 Tab by mouth daily.  FOLIC ACID PO Take 557 mcg by mouth daily.  vancomycin in 0.9% Sodium Cl (VANCOCIN) 2 gram/500 mL soln 500 mL by IntraVENous route every twenty-four (24) hours for 38 days. Indications: infection of bone, M86.172 (Patient taking differently: 500 mL by IntraVENous route every twenty-four (24) hours. given daily at [facility]  Indications: infection of bone, M86.172) 12542 mL    cephALEXin (KEFLEX) 500 mg capsule Take 500 mg by mouth four (4) times daily. Indications: Pt is not sure of correct dose     aspirin delayed-release 81 mg tablet Take 325 mg by mouth daily.  lisinopril (PRINIVIL, ZESTRIL) 5 mg tablet Take 5 mg by mouth daily. Take once in the Morning     atorvastatin (LIPITOR) 80 mg tablet Take 80 mg by mouth nightly. Take once in the Evening     levocetirizine (XYZAL) 5 mg tablet Take 5 mg by mouth daily. Take once in the Evening     montelukast (SINGULAIR) 10 mg tablet Take 10 mg by mouth daily. Take once in the morning     tamsulosin (FLOMAX) 0.4 mg capsule Take 1 Cap by mouth daily. 1 Cap    amLODIPine (NORVASC) 10 mg tablet Take 10 mg by mouth daily.      omeprazole (PRILOSEC) 10 mg capsule Take 20 mg by mouth daily.  sertraline (ZOLOFT) 50 mg tablet Take 100 mg by mouth daily.  ibuprofen (MOTRIN) 800 mg tablet Take 800 mg by mouth every eight (8) hours as needed for Pain. Indications: pain     allopurinol (ZYLOPRIM) 100 mg tablet Take 300 mg by mouth daily.  metoprolol tartrate (LOPRESSOR) 25 mg tablet Take 25 mg by mouth daily. Once a day in the morning     multivitamin (ONE A DAY) tablet Take 1 Tab by Mouth Once a Day.  omega 3-dha-epa-fish oil (FISH OIL) 60- mg cap Take 500 mg by mouth daily.  cholecalciferol, vitamin d3, (VITAMIN D3) 400 unit cap Take 2,000 Units by mouth daily. No current facility-administered medications for this encounter. Review of Systems - General ROS: negative for - chills, fever or night sweats  Respiratory ROS: no cough, shortness of breath, or wheezing  Cardiovascular ROS: no chest pain or dyspnea on exertion  Gastrointestinal ROS: no abdominal pain, change in bowel habits, or black or bloody stools       Objective:  Visit Vitals  /70 (BP 1 Location: Right arm, BP Patient Position: Sitting)   Pulse 65   Temp 97.6 °F (36.4 °C)   Resp 18   SpO2 98%       Temp (24hrs), Av.6 °F (36.4 °C), Min:97.6 °F (36.4 °C), Max:97.6 °F (36.4 °C)      GEN: WDWN WM in NAD - ambulatory with a boot. HEENT: anicteric  CHEST: CTA  CVS:RRR  R PICC:  Non-inflamatory  L Great Toe: Incision clean/dry with graft overlying; no redness or suppuration apparent       Lab results:    Chemistry  No results for input(s): GLU, NA, K, CL, CO2, BUN, CREA, CA, AGAP, BUCR, TBIL, GPT, AP, TP, ALB, GLOB, AGRAT in the last 72 hours. CBC w/ Diff  No results for input(s): WBC, RBC, HGB, HCT, PLT, GRANS, LYMPH, EOS, HGBEXT, HCTEXT, PLTEXT in the last 72 hours.     Microbiology  All Micro Results     None           Lashay Macdonald MD  Cell [phone]  [facility]   3/11/2020   10:27 AM

## 2020-03-11 NOTE — WOUND CARE
Pt seen in wound clinic by Dr. Yohana Cannon. Vital signs taken and documented in graphics flowsheet.

## 2020-03-12 PROCEDURE — 3331090001 HH PPS REVENUE CREDIT

## 2020-03-12 PROCEDURE — 3331090002 HH PPS REVENUE DEBIT

## 2020-03-13 ENCOUNTER — HOME CARE VISIT (OUTPATIENT)
Dept: SCHEDULING | Facility: HOME HEALTH | Age: 70
End: 2020-03-13
Payer: MEDICARE

## 2020-03-13 PROCEDURE — 3331090002 HH PPS REVENUE DEBIT

## 2020-03-13 PROCEDURE — G0299 HHS/HOSPICE OF RN EA 15 MIN: HCPCS

## 2020-03-13 PROCEDURE — 3331090001 HH PPS REVENUE CREDIT

## 2020-03-13 PROCEDURE — 400013 HH SOC

## 2020-03-13 PROCEDURE — 3331090003 HH PPS REVENUE ADJ

## 2020-03-14 VITALS
HEART RATE: 62 BPM | SYSTOLIC BLOOD PRESSURE: 124 MMHG | DIASTOLIC BLOOD PRESSURE: 72 MMHG | TEMPERATURE: 98.3 F | RESPIRATION RATE: 14 BRPM | OXYGEN SATURATION: 98 %

## 2020-03-14 PROCEDURE — 3331090002 HH PPS REVENUE DEBIT

## 2020-03-14 PROCEDURE — 3331090001 HH PPS REVENUE CREDIT

## 2020-03-15 PROCEDURE — 3331090001 HH PPS REVENUE CREDIT

## 2020-03-15 PROCEDURE — 3331090002 HH PPS REVENUE DEBIT

## 2020-04-08 ENCOUNTER — APPOINTMENT (OUTPATIENT)
Dept: WOUND CARE | Age: 70
End: 2020-04-08
Attending: INTERNAL MEDICINE

## 2020-05-06 ENCOUNTER — HOSPITAL ENCOUNTER (OUTPATIENT)
Dept: WOUND CARE | Age: 70
Discharge: HOME | End: 2020-05-06
Attending: INTERNAL MEDICINE
Payer: MEDICARE

## 2020-05-06 VITALS
HEART RATE: 77 BPM | DIASTOLIC BLOOD PRESSURE: 76 MMHG | SYSTOLIC BLOOD PRESSURE: 127 MMHG | OXYGEN SATURATION: 100 % | TEMPERATURE: 97.9 F | RESPIRATION RATE: 18 BRPM

## 2020-05-06 PROCEDURE — 99211 OFF/OP EST MAY X REQ PHY/QHP: CPT

## 2020-05-06 NOTE — WOUND CARE
[facility] 
                                             (A Division of [address] [place] [address]) Follow-up Note Date of Admission: 5/6/2020     Date of Note:  5/6/2020 Summary:   
 
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  No f/s/c 
  
Last seen in clinic  3/11. Interval History: 
CC:  Much better Since I last saw him he has transitioned over to bid PO tmp-smx DS. Skin deficit has closed, edema resolved, and pain gone. Happy. No f/s/c. Will be having blood drawn for his other doctors next week and next month (RA). Current Antimicrobials: Prior Antimicrobials 1. Tmp-Smx PO DS #1m 1. Vanco IV (1/27-3/12) 2. Ertapenem IV (2/3-3/12) Assessment Plan: L Great Toe hardware infection 1/27:  CRP - 41mg/L 
2/3:   CRP - 37mg/L 
2/10:  CRP - 12mg/L 
2/17:  CRP - 5mg/L 
2/24:  CRP - 5mg/L 
3/2:    CRP - 7mg/L 
3/9:    CRP - 5mg/L Better. Will remain on life-long suppression dose (just one DS tablet daily); told him to back off to just one tablet daily. ->Tmp-smx DS Ideally, would check a CBC and BMP, but with him getting draws very soon, will refrain from today. As long as he's getting a CBC and BMP q4-6m, I'm good. I'll see him back in 4m and from there semi-annually to issue his rx. PAD   
RA   
HTN Microbiology:                1/27 - Wound (+) E coli (S-all) and CoNS 
  
  
Lines / Williamean Boris Patient Active Problem List  
Diagnosis Code  Enlarged prostate with lower urinary tract symptoms (LUTS) N40.1  Calculus of both kidneys N20.0  Rheumatoid arthritis ([address] M06.9  Essential hypertension I10  
 GERD (gastroesophageal reflux disease) K21.9  Disorder of bone and cartilage, unspecified M89.9, M94.9  Depression F32.9  Aortic stenosis I35.0  Gout M10.9  Impaired fasting blood sugar R73.01  
 Male erectile dysfunction, unspecified N52.9  Anxiety state F41.1  Chronic allergic rhinitis J30.9  History of aortic valve replacement Z95.2  Deviated nasal septum J34.2  Chronic maxillary sinusitis J32.0  Multiple-type hyperlipidemia E78.2  
 SHAILESH (obstructive sleep apnea) G47.33  
 Sensorineural hearing loss (SNHL) of both ears H90.3  Cellulitis L03.90  Cellulitis of foot, left L03. 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  
 Cellulitis and abscess of lower extremity L03.119, L02.419  Infection and inflammatory reaction due to other internal orthopedic prosthetic devices, implants and grafts, initial encounter ([facility] T84. 7XXA Current Outpatient Medications Medication Sig Dispense  clotrimazole (MYCELEX) 10 mg jc Take 10 mg by mouth daily.  calcium carbonate (CALTREX) 600 mg calcium (1,500 mg) tablet Take 1 Tab by mouth daily.  FOLIC ACID PO Take 397 mcg by mouth daily.  cephALEXin (KEFLEX) 500 mg capsule Take 500 mg by mouth four (4) times daily. Indications: Pt is not sure of correct dose  aspirin delayed-release 81 mg tablet Take 325 mg by mouth daily.  lisinopril (PRINIVIL, ZESTRIL) 5 mg tablet Take 5 mg by mouth daily. Take once in the Morning  atorvastatin (LIPITOR) 80 mg tablet Take 80 mg by mouth nightly. Take once in the Evening  levocetirizine (XYZAL) 5 mg tablet Take 5 mg by mouth daily. Take once in the Evening  montelukast (SINGULAIR) 10 mg tablet Take 10 mg by mouth daily. Take once in the morning  tamsulosin (FLOMAX) 0.4 mg capsule Take 1 Cap by mouth daily. 1 Cap  amLODIPine (NORVASC) 10 mg tablet Take 10 mg by mouth daily.  omeprazole (PRILOSEC) 10 mg capsule Take 20 mg by mouth daily.  sertraline (ZOLOFT) 50 mg tablet Take 100 mg by mouth daily.  ibuprofen (MOTRIN) 800 mg tablet Take 800 mg by mouth every eight (8) hours as needed for Pain. Indications: pain  allopurinol (ZYLOPRIM) 100 mg tablet Take 300 mg by mouth daily.  metoprolol tartrate (LOPRESSOR) 25 mg tablet Take 25 mg by mouth daily. Once a day in the morning  multivitamin (ONE A DAY) tablet Take 1 Tab by Mouth Once a Day.  omega 3-dha-epa-fish oil (FISH OIL) 60- mg cap Take 500 mg by mouth daily.  cholecalciferol, vitamin d3, (VITAMIN D3) 400 unit cap Take 2,000 Units by mouth daily. No current facility-administered medications for this encounter. Review of Systems - General ROS: negative for - chills, fever or night sweats Respiratory ROS: no cough, shortness of breath, or wheezing Cardiovascular ROS: no chest pain or dyspnea on exertion Gastrointestinal ROS: no abdominal pain, change in bowel habits, or black or bloody stools Objective: There were no vitals taken for this visit. No data recorded. GEN: WDWN WM in NAD - ambulatory without device HEENT: anicteric CHEST: CTA CVS:RRR 
ABD: NT 
L foot: L great toe incision closed/dry; no swelling/redness/DC Lab results: 
 
Chemistry No results for input(s): GLU, NA, K, CL, CO2, BUN, CREA, CA, AGAP, BUCR, TBIL, GPT, AP, TP, ALB, GLOB, AGRAT in the last 72 hours. CBC w/ Diff No results for input(s): WBC, RBC, HGB, HCT, PLT, GRANS, LYMPH, EOS, HGBEXT, HCTEXT, PLTEXT in the last 72 hours. Microbiology All Micro Results None Cammy Christianson MD 
Cell [phone] [facility] 5/6/2020  
9:13 AM

## 2020-08-05 ENCOUNTER — HOSPITAL ENCOUNTER (OUTPATIENT)
Dept: WOUND CARE | Age: 70
Discharge: HOME | End: 2020-08-05
Attending: INTERNAL MEDICINE
Payer: MEDICARE

## 2020-08-05 ENCOUNTER — HOSPITAL ENCOUNTER (OUTPATIENT)
Dept: LAB | Age: 70
Discharge: HOME | End: 2020-08-05
Payer: MEDICARE

## 2020-08-05 VITALS
DIASTOLIC BLOOD PRESSURE: 66 MMHG | OXYGEN SATURATION: 100 % | RESPIRATION RATE: 18 BRPM | HEART RATE: 64 BPM | SYSTOLIC BLOOD PRESSURE: 105 MMHG | TEMPERATURE: 97.9 F

## 2020-08-05 LAB
ANION GAP SERPL CALC-SCNC: 6 MMOL/L (ref 3–18)
BASOPHILS # BLD: 0 K/UL (ref 0–0.1)
BASOPHILS NFR BLD: 0 % (ref 0–2)
BUN SERPL-MCNC: 29 MG/DL (ref 7–18)
BUN/CREAT SERPL: 18 (ref 12–20)
CALCIUM SERPL-MCNC: 9.1 MG/DL (ref 8.5–10.1)
CHLORIDE SERPL-SCNC: 109 MMOL/L (ref 100–111)
CO2 SERPL-SCNC: 23 MMOL/L (ref 21–32)
CREAT SERPL-MCNC: 1.65 MG/DL (ref 0.6–1.3)
CRP SERPL-MCNC: 0.7 MG/DL (ref 0–0.3)
DIFFERENTIAL METHOD BLD: ABNORMAL
EOSINOPHIL # BLD: 0.3 K/UL (ref 0–0.4)
EOSINOPHIL NFR BLD: 7 % (ref 0–5)
ERYTHROCYTE [DISTWIDTH] IN BLOOD BY AUTOMATED COUNT: 15.8 % (ref 11.6–14.5)
GLUCOSE SERPL-MCNC: 80 MG/DL (ref 74–99)
HCT VFR BLD AUTO: 37.4 % (ref 36–48)
HGB BLD-MCNC: 11.9 G/DL (ref 13–16)
LYMPHOCYTES # BLD: 1.7 K/UL (ref 0.9–3.6)
LYMPHOCYTES NFR BLD: 37 % (ref 21–52)
MCH RBC QN AUTO: 31.8 PG (ref 24–34)
MCHC RBC AUTO-ENTMCNC: 31.8 G/DL (ref 31–37)
MCV RBC AUTO: 100 FL (ref 74–97)
MONOCYTES # BLD: 0.4 K/UL (ref 0.05–1.2)
MONOCYTES NFR BLD: 9 % (ref 3–10)
NEUTS SEG # BLD: 2.2 K/UL (ref 1.8–8)
NEUTS SEG NFR BLD: 47 % (ref 40–73)
PLATELET # BLD AUTO: 231 K/UL (ref 135–420)
PMV BLD AUTO: 9.4 FL (ref 9.2–11.8)
POTASSIUM SERPL-SCNC: 5 MMOL/L (ref 3.5–5.5)
RBC # BLD AUTO: 3.74 M/UL (ref 4.7–5.5)
SODIUM SERPL-SCNC: 138 MMOL/L (ref 136–145)
WBC # BLD AUTO: 4.7 K/UL (ref 4.6–13.2)

## 2020-08-05 PROCEDURE — 85025 COMPLETE CBC W/AUTO DIFF WBC: CPT

## 2020-08-05 PROCEDURE — 80048 BASIC METABOLIC PNL TOTAL CA: CPT

## 2020-08-05 PROCEDURE — 36415 COLL VENOUS BLD VENIPUNCTURE: CPT

## 2020-08-05 PROCEDURE — 86140 C-REACTIVE PROTEIN: CPT

## 2020-08-05 PROCEDURE — 99211 OFF/OP EST MAY X REQ PHY/QHP: CPT

## 2020-08-05 RX ORDER — SULFAMETHOXAZOLE AND TRIMETHOPRIM 800; 160 MG/1; MG/1
1 TABLET ORAL 2 TIMES DAILY
Qty: 180 TAB | Refills: 1 | Status: SHIPPED | OUTPATIENT
Start: 2020-08-05 | End: 2020-11-03

## 2020-08-05 NOTE — WOUND CARE
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  No f/s/c     Last seen in clinic  5/6. CC:  \"Doing well\"    Interval History:  Since I last saw him, he has been seen by his [facility] with good report. Not seen by [facility] yet (COVDI-19 pandemic). Feels good, but taking bid tmp-smx DS. Foot feels worse on just once daily. No f/s/c,  N/v. Or diarrhea. Current Antimicrobials:    Prior Antimicrobials:  1. Tmp-Smx PO DS #3m 1. Vanco IV (1/27-3/12)  2. Ertapenem IV (2/3-3/12)       Assessment: Rec / Plan:   L Great Toe hardware infection  1/27:  CRP - 41mg/L  2/3:   CRP - 37mg/L  2/10:  CRP - 12mg/L  2/17:  CRP - 5mg/L  2/24:  CRP - 5mg/L  3/2:    CRP - 7mg/L  3/9:    CRP - 5mg/L    Stable. Doing well. Will check labs since its been a few months and he's taking an ACE-I. ->Tmp-smx DS #3m suppression    BMP, CBC, and CRP today  Refilled rx.   RTC 6m   PAD    RA    HTN            Microbiology:                1/27 - Wound (+) E coli (S-all) and CoNS        Lines / Brendia Schooling        Patient Active Problem List   Diagnosis Code    Enlarged prostate with lower urinary tract symptoms (LUTS) N40.1    Calculus of both kidneys N20.0    Rheumatoid arthritis ([address] M06.9    Essential hypertension I10    GERD (gastroesophageal reflux disease) K21.9    Disorder of bone and cartilage, unspecified M89.9, M94.9    Depression F32.9    Aortic stenosis I35.0    Gout M10.9    Impaired fasting blood sugar R73.01    Male erectile dysfunction, unspecified N52.9    Anxiety state F41.1    Chronic allergic rhinitis J30.9    History of aortic valve replacement Z95.2    Deviated nasal septum J34.2    Chronic maxillary sinusitis J32.0    Multiple-type hyperlipidemia E78.2    SHAILESH (obstructive sleep apnea) G47.33    Sensorineural hearing loss (SNHL) of both ears H90.3    Cellulitis L03.90    Cellulitis of foot, left L03. 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 7XXA       Current Outpatient Medications   Medication Sig Dispense    clotrimazole (MYCELEX) 10 mg jc Take 10 mg by mouth daily.  calcium carbonate (CALTREX) 600 mg calcium (1,500 mg) tablet Take 1 Tab by mouth daily.  FOLIC ACID PO Take 464 mcg by mouth daily.  cephALEXin (KEFLEX) 500 mg capsule Take 500 mg by mouth four (4) times daily. Indications: Pt is not sure of correct dose     aspirin delayed-release 81 mg tablet Take 325 mg by mouth daily.  lisinopril (PRINIVIL, ZESTRIL) 5 mg tablet Take 5 mg by mouth daily. Take once in the Morning     atorvastatin (LIPITOR) 80 mg tablet Take 80 mg by mouth nightly. Take once in the Evening     levocetirizine (XYZAL) 5 mg tablet Take 5 mg by mouth daily. Take once in the Evening     montelukast (SINGULAIR) 10 mg tablet Take 10 mg by mouth daily. Take once in the morning     tamsulosin (FLOMAX) 0.4 mg capsule Take 1 Cap by mouth daily. 1 Cap    amLODIPine (NORVASC) 10 mg tablet Take 10 mg by mouth daily.  omeprazole (PRILOSEC) 10 mg capsule Take 20 mg by mouth daily.  sertraline (ZOLOFT) 50 mg tablet Take 100 mg by mouth daily.  ibuprofen (MOTRIN) 800 mg tablet Take 800 mg by mouth every eight (8) hours as needed for Pain. Indications: pain     allopurinol (ZYLOPRIM) 100 mg tablet Take 300 mg by mouth daily.      metoprolol tartrate (LOPRESSOR) 25 mg tablet Take 25 mg by mouth daily. Once a day in the morning     multivitamin (ONE A DAY) tablet Take 1 Tab by Mouth Once a Day.  omega 3-dha-epa-fish oil (FISH OIL) 60- mg cap Take 500 mg by mouth daily.  cholecalciferol, vitamin d3, (VITAMIN D3) 400 unit cap Take 2,000 Units by mouth daily. No current facility-administered medications for this encounter. Review of Systems - General ROS: negative for - chills, fatigue, fever, malaise or night sweats  Respiratory ROS: no cough, shortness of breath, or wheezing  Cardiovascular ROS: no chest pain or dyspnea on exertion  Gastrointestinal ROS: no abdominal pain, change in bowel habits, or black or bloody stools  Genito-Urinary ROS: no dysuria, trouble voiding, or hematuria       Objective: There were no vitals taken for this visit. No data recorded. GEN: Tanned skinny WM in NAD - ambulatory  HEENT: anicteric  CHEST: CTA  CVS:RRR  ABD: NT  EXT: L foot with non-suppurative scab across dorsum of Great Toe. Looks good. Lab results:    Chemistry  No results for input(s): GLU, NA, K, CL, CO2, BUN, CREA, CA, AGAP, BUCR, TBIL, AP, TP, ALB, GLOB, AGRAT in the last 72 hours. No lab exists for component: GPT    CBC w/ Diff  No results for input(s): WBC, RBC, HGB, HCT, PLT, GRANS, LYMPH, EOS, HGBEXT, HCTEXT, PLTEXT in the last 72 hours.     Microbiology  All Micro Results     None           Anjana Anderson MD  Cell [phone]  [facility]   8/5/2020   9:28 AM

## 2021-01-27 ENCOUNTER — HOSPITAL ENCOUNTER (OUTPATIENT)
Dept: WOUND CARE | Age: 71
Discharge: HOME | End: 2021-01-27
Attending: INTERNAL MEDICINE
Payer: MEDICARE

## 2021-01-27 VITALS
RESPIRATION RATE: 16 BRPM | SYSTOLIC BLOOD PRESSURE: 143 MMHG | DIASTOLIC BLOOD PRESSURE: 65 MMHG | TEMPERATURE: 98.3 F | OXYGEN SATURATION: 100 % | HEART RATE: 82 BPM

## 2021-01-27 PROCEDURE — 99211 OFF/OP EST MAY X REQ PHY/QHP: CPT

## 2021-01-27 RX ORDER — SULFAMETHOXAZOLE AND TRIMETHOPRIM 800; 160 MG/1; MG/1
1 TABLET ORAL DAILY
Qty: 1 TAB | Refills: 1 | Status: SHIPPED | OUTPATIENT
Start: 2021-01-27 | End: 2021-09-29

## 2021-01-27 NOTE — WOUND CARE
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  No f/s/c     Last seen in clinic 8/5     CC: \"doing good - out of meds\"    Interval History:  Doing well. Taking 1 pill daily faithfully. Ran out a few weeks ago. Toe covered over. No pain or DC. Getting lots of labs from PCM/[facility]. Current Antimicrobials:    Prior Antimicrobials:  1. Tmp-Smx PO DS #8m 1. Vanco IV (1/27-3/12/20)  2. Ertapenem IV (2/3-3/12/20)       Assessment: Rec / Plan:   L Great Toe hardware infection  1/27:  CRP - 41mg/L  3/9:    CRP - 5mg/L  8/5:    CRP - 7mg/L  I don't need any labs. Doing well. Refill. Talked about periodically checking CBC/BMP with his other docs.   He's already taking folate daily ->Tmp-smx DS #8m suppression    Hand-written script for next 6m given  Can feasibly get this through PCM  RTC 6m   PAD    RA    HTN            Microbiology:                1/27/20 - Wound (+) E coli (S-all) and CoNS        Lines / Annabelle Larios        Patient Active Problem List   Diagnosis Code    Enlarged prostate with lower urinary tract symptoms (LUTS) N40.1    Calculus of both kidneys N20.0    Rheumatoid arthritis ([facility] [address] M06.9    Essential hypertension I10    GERD (gastroesophageal reflux disease) K21.9    Disorder of bone and cartilage, unspecified M89.9, M94.9    Depression F32.9    Aortic stenosis I35.0    Gout M10.9    Impaired fasting blood sugar R73.01    Male erectile dysfunction, unspecified N52.9    Anxiety state F41.1    Chronic allergic rhinitis J30.9    History of aortic valve replacement Z95.2    Deviated nasal septum J34.2    Chronic maxillary sinusitis J32.0    Multiple-type hyperlipidemia E78.2    SHAILESH (obstructive sleep apnea) G47.33    Sensorineural hearing loss (SNHL) of both ears H90.3    Cellulitis L03.90    Cellulitis of foot, left L03. 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 7XXA       Current Outpatient Medications   Medication Sig Dispense    calcium carbonate-vitamin D3 600 mg(1,500mg) -800 unit tab Take 750 Tabs by mouth daily.  etanercept (EnbreL) 50 mg/mL (1 mL) injection 1 mL by SubCUTAneous route.  ipratropium (ATROVENT) 0.03 % nasal spray 2 SPRAYS EACH SIDE OF THE NOSE UP TO 4 TIMES PER DAY AS NEEDED FOR RUNNY NOSE OR POSTNASAL DRIP     losartan (COZAAR) 25 mg tablet TAKE 1 TABLET BY MOUTH EVERY DAY     methotrexate (RHEUMATREX) 2.5 mg tablet Take  by mouth.  methotrexate (RHEUMATREX) 2.5 mg tablet TAKE 6 TABLET BY ORAL ROUTE EVERY 7 DAYS     simvastatin (ZOCOR) 10 mg tablet Take  by mouth.  tamsulosin (FLOMAX) 0.4 mg capsule Take 2 Caps by mouth daily. 180 Cap    trimethoprim-sulfamethoxazole (BACTRIM DS, SEPTRA DS) 160-800 mg per tablet Take 1 Tab by mouth daily. 1 Tab    calcium carbonate (CALTREX) 600 mg calcium (1,500 mg) tablet Take 1 Tab by mouth daily.  FOLIC ACID PO Take 559 mcg by mouth daily.  aspirin delayed-release 81 mg tablet Take 325 mg by mouth daily.  atorvastatin (LIPITOR) 80 mg tablet Take 80 mg by mouth nightly. Take once in the Evening     levocetirizine (XYZAL) 5 mg tablet Take 5 mg by mouth daily. Take once in the Evening     montelukast (SINGULAIR) 10 mg tablet Take 10 mg by mouth daily. Take once in the morning     amLODIPine (NORVASC) 10 mg tablet Take 10 mg by mouth daily.      omeprazole (PRILOSEC) 10 mg capsule Take 20 mg by mouth daily.  sertraline (ZOLOFT) 50 mg tablet Take 100 mg by mouth daily.  ibuprofen (MOTRIN) 800 mg tablet Take 800 mg by mouth every eight (8) hours as needed for Pain. Indications: pain     allopurinol (ZYLOPRIM) 100 mg tablet Take 300 mg by mouth daily.  metoprolol tartrate (LOPRESSOR) 25 mg tablet Take 25 mg by mouth daily. Once a day in the morning     multivitamin (ONE A DAY) tablet Take 1 Tab by Mouth Once a Day.  omega 3-dha-epa-fish oil (FISH OIL) 60- mg cap Take 500 mg by mouth daily.  cholecalciferol, vitamin d3, (VITAMIN D3) 400 unit cap Take 2,000 Units by mouth daily. No current facility-administered medications for this encounter. Review of Systems - General ROS: negative for - chills, fever or night sweats  Respiratory ROS: no cough, shortness of breath, or wheezing  Cardiovascular ROS: no chest pain or dyspnea on exertion  Gastrointestinal ROS: no abdominal pain, change in bowel habits, or black or bloody stools       Objective: There were no vitals taken for this visit. No data recorded. GEN: WDWN WM in NAD - ambulatory  L Great Toe:  Incision completely closed over/dry; no erythema         Lab results:    Chemistry  No results for input(s): GLU, NA, K, CL, CO2, BUN, CREA, CA, AGAP, BUCR, TBIL, AP, TP, ALB, GLOB, AGRAT in the last 72 hours. No lab exists for component: GPT    CBC w/ Diff  No results for input(s): WBC, RBC, HGB, HCT, PLT, GRANS, LYMPH, EOS, HGBEXT, HCTEXT, PLTEXT in the last 72 hours.     Microbiology  All Micro Results     None           Elton Salomon MD  Cell [phone]  [facility]   1/27/2021   9:04 AM

## 2021-02-04 ENCOUNTER — TRANSCRIBE ORDER (OUTPATIENT)
Dept: SCHEDULING | Age: 71
End: 2021-02-04

## 2021-02-04 DIAGNOSIS — K20.90 ESOPHAGITIS: Primary | ICD-10-CM

## 2021-02-05 ENCOUNTER — TRANSCRIBE ORDER (OUTPATIENT)
Dept: SCHEDULING | Age: 71
End: 2021-02-05

## 2021-02-10 ENCOUNTER — HOSPITAL ENCOUNTER (OUTPATIENT)
Dept: GENERAL RADIOLOGY | Age: 71
Discharge: HOME | End: 2021-02-10
Attending: INTERNAL MEDICINE
Payer: MEDICARE

## 2021-02-10 DIAGNOSIS — K20.90 ESOPHAGITIS: ICD-10-CM

## 2021-02-10 PROCEDURE — 74246 X-RAY XM UPR GI TRC 2CNTRST: CPT

## 2021-02-10 PROCEDURE — 74011000255 HC RX REV CODE- 255: Performed by: INTERNAL MEDICINE

## 2021-02-10 PROCEDURE — 74011000250 HC RX REV CODE- 250: Performed by: INTERNAL MEDICINE

## 2021-02-10 RX ADMIN — ANTACID/ANTIFLATULENT 4 G: 380; 550; 10; 10 GRANULE, EFFERVESCENT ORAL at 09:32

## 2021-02-10 RX ADMIN — BARIUM SULFATE 135 ML: 980 POWDER, FOR SUSPENSION ORAL at 09:32

## 2021-05-25 ENCOUNTER — TRANSCRIBE ORDER (OUTPATIENT)
Dept: REGISTRATION | Age: 71
End: 2021-05-25

## 2021-05-25 ENCOUNTER — HOSPITAL ENCOUNTER (OUTPATIENT)
Dept: PREADMISSION TESTING | Age: 71
Discharge: HOME | End: 2021-05-25
Payer: MEDICARE

## 2021-05-25 DIAGNOSIS — K21.9 GASTROESOPHAGEAL REFLUX DISEASE: ICD-10-CM

## 2021-05-25 DIAGNOSIS — K44.9 HIATAL HERNIA: Primary | ICD-10-CM

## 2021-05-25 DIAGNOSIS — K44.9 HIATAL HERNIA: ICD-10-CM

## 2021-05-25 LAB
ATRIAL RATE: 63 BPM
BASOPHILS # BLD: 0 K/UL (ref 0–0.1)
BASOPHILS NFR BLD: 1 % (ref 0–2)
CALCULATED P AXIS, ECG09: 56 DEGREES
CALCULATED R AXIS, ECG10: 6 DEGREES
CALCULATED T AXIS, ECG11: 44 DEGREES
DIAGNOSIS, 93000: NORMAL
DIFFERENTIAL METHOD BLD: ABNORMAL
EOSINOPHIL # BLD: 0.4 K/UL (ref 0–0.4)
EOSINOPHIL NFR BLD: 8 % (ref 0–5)
ERYTHROCYTE [DISTWIDTH] IN BLOOD BY AUTOMATED COUNT: 16.3 % (ref 11.6–14.5)
HCT VFR BLD AUTO: 37.5 % (ref 36–48)
HGB BLD-MCNC: 12.7 G/DL (ref 13–16)
LYMPHOCYTES # BLD: 1.5 K/UL (ref 0.9–3.6)
LYMPHOCYTES NFR BLD: 32 % (ref 21–52)
MCH RBC QN AUTO: 33.2 PG (ref 24–34)
MCHC RBC AUTO-ENTMCNC: 33.9 G/DL (ref 31–37)
MCV RBC AUTO: 98.2 FL (ref 74–97)
MONOCYTES # BLD: 0.9 K/UL (ref 0.05–1.2)
MONOCYTES NFR BLD: 19 % (ref 3–10)
NEUTS SEG # BLD: 1.9 K/UL (ref 1.8–8)
NEUTS SEG NFR BLD: 42 % (ref 40–73)
P-R INTERVAL, ECG05: 218 MS
PLATELET # BLD AUTO: 265 K/UL (ref 135–420)
PMV BLD AUTO: 9.6 FL (ref 9.2–11.8)
Q-T INTERVAL, ECG07: 408 MS
QRS DURATION, ECG06: 104 MS
QTC CALCULATION (BEZET), ECG08: 417 MS
RBC # BLD AUTO: 3.82 M/UL (ref 4.35–5.65)
VENTRICULAR RATE, ECG03: 63 BPM
WBC # BLD AUTO: 4.7 K/UL (ref 4.6–13.2)

## 2021-05-25 PROCEDURE — 85025 COMPLETE CBC W/AUTO DIFF WBC: CPT

## 2021-05-25 PROCEDURE — 36415 COLL VENOUS BLD VENIPUNCTURE: CPT

## 2021-05-25 PROCEDURE — 93005 ELECTROCARDIOGRAM TRACING: CPT

## 2021-06-11 ENCOUNTER — ANESTHESIA EVENT (OUTPATIENT)
Dept: SURGERY | Age: 71
End: 2021-06-11
Payer: MEDICARE

## 2021-06-11 ENCOUNTER — HOSPITAL ENCOUNTER (OUTPATIENT)
Age: 71
Setting detail: OBSERVATION
Discharge: HOME | End: 2021-06-12
Attending: SURGERY | Admitting: SURGERY
Payer: MEDICARE

## 2021-06-11 ENCOUNTER — ANESTHESIA (OUTPATIENT)
Dept: SURGERY | Age: 71
End: 2021-06-11
Payer: MEDICARE

## 2021-06-11 DIAGNOSIS — Z98.890 S/P LAPAROSCOPIC FUNDOPLICATION: Primary | ICD-10-CM

## 2021-06-11 PROBLEM — K21.9 HIATAL HERNIA WITH GASTROESOPHAGEAL REFLUX: Status: ACTIVE | Noted: 2021-06-11

## 2021-06-11 PROBLEM — K44.9 HIATAL HERNIA WITH GASTROESOPHAGEAL REFLUX: Status: ACTIVE | Noted: 2021-06-11

## 2021-06-11 PROBLEM — I10 HTN (HYPERTENSION): Status: ACTIVE | Noted: 2021-06-11

## 2021-06-11 PROBLEM — J45.909 ASTHMA: Status: ACTIVE | Noted: 2021-06-11

## 2021-06-11 LAB
ANION GAP SERPL CALC-SCNC: 7 MMOL/L (ref 3–18)
BUN SERPL-MCNC: 21 MG/DL (ref 7–18)
BUN/CREAT SERPL: 26 (ref 12–20)
CALCIUM SERPL-MCNC: 9.1 MG/DL (ref 8.5–10.1)
CHLORIDE SERPL-SCNC: 109 MMOL/L (ref 100–111)
CO2 SERPL-SCNC: 24 MMOL/L (ref 21–32)
CREAT SERPL-MCNC: 0.82 MG/DL (ref 0.6–1.3)
GLUCOSE SERPL-MCNC: 88 MG/DL (ref 74–99)
POTASSIUM SERPL-SCNC: 3.7 MMOL/L (ref 3.5–5.5)
SODIUM SERPL-SCNC: 140 MMOL/L (ref 136–145)

## 2021-06-11 PROCEDURE — 74011250637 HC RX REV CODE- 250/637: Performed by: INTERNAL MEDICINE

## 2021-06-11 PROCEDURE — 80048 BASIC METABOLIC PNL TOTAL CA: CPT

## 2021-06-11 PROCEDURE — 77030027138 HC INCENT SPIROMETER -A

## 2021-06-11 PROCEDURE — 74011000250 HC RX REV CODE- 250: Performed by: INTERNAL MEDICINE

## 2021-06-11 PROCEDURE — 77030008574 HC TBNG SUC IRR STRY -B: Performed by: SURGERY

## 2021-06-11 PROCEDURE — 77030002972 HC SUT PLEDG J&J -A: Performed by: SURGERY

## 2021-06-11 PROCEDURE — 77030005537 HC CATH URETH BARD -A: Performed by: SURGERY

## 2021-06-11 PROCEDURE — 76010000134 HC OR TIME 3.5 TO 4 HR: Performed by: SURGERY

## 2021-06-11 PROCEDURE — 77030009957 HC RELD ENDOSTCH COVD -C: Performed by: SURGERY

## 2021-06-11 PROCEDURE — 74011250636 HC RX REV CODE- 250/636: Performed by: INTERNAL MEDICINE

## 2021-06-11 PROCEDURE — 2709999900 HC NON-CHARGEABLE SUPPLY: Performed by: SURGERY

## 2021-06-11 PROCEDURE — 77030009964 HC RELD STPLR ENDOS COVD -B: Performed by: SURGERY

## 2021-06-11 PROCEDURE — 77030002933 HC SUT MCRYL J&J -A: Performed by: SURGERY

## 2021-06-11 PROCEDURE — 77030020782 HC GWN BAIR PAWS FLX 3M -B: Performed by: SURGERY

## 2021-06-11 PROCEDURE — 77030005513 HC CATH URETH FOL11 MDII -B: Performed by: SURGERY

## 2021-06-11 PROCEDURE — 77030008518 HC TBNG INSUF ENDO STRY -B: Performed by: SURGERY

## 2021-06-11 PROCEDURE — 74011000258 HC RX REV CODE- 258: Performed by: SURGERY

## 2021-06-11 PROCEDURE — 77030013079 HC BLNKT BAIR HGGR 3M -A: Performed by: INTERNAL MEDICINE

## 2021-06-11 PROCEDURE — 77030031139 HC SUT VCRL2 J&J -A: Performed by: SURGERY

## 2021-06-11 PROCEDURE — 76210000006 HC OR PH I REC 0.5 TO 1 HR: Performed by: SURGERY

## 2021-06-11 PROCEDURE — 74011250636 HC RX REV CODE- 250/636: Performed by: SURGERY

## 2021-06-11 PROCEDURE — 77030012799 HC TRCR GELPRT BLN AMR -B: Performed by: SURGERY

## 2021-06-11 PROCEDURE — 77030034154 HC SHR COAG HARM ACE J&J -F: Performed by: SURGERY

## 2021-06-11 PROCEDURE — 36415 COLL VENOUS BLD VENIPUNCTURE: CPT

## 2021-06-11 PROCEDURE — 76060000038 HC ANESTHESIA 3.5 TO 4 HR: Performed by: SURGERY

## 2021-06-11 PROCEDURE — 99218 HC RM OBSERVATION: CPT

## 2021-06-11 PROCEDURE — 77030008683 HC TU ET CUF COVD -A: Performed by: INTERNAL MEDICINE

## 2021-06-11 PROCEDURE — 77030010288: Performed by: SURGERY

## 2021-06-11 PROCEDURE — 77030010507 HC ADH SKN DERMBND J&J -B: Performed by: SURGERY

## 2021-06-11 PROCEDURE — 74011000258 HC RX REV CODE- 258: Performed by: INTERNAL MEDICINE

## 2021-06-11 PROCEDURE — 77030012422 HC DRN WND COVD -A: Performed by: SURGERY

## 2021-06-11 PROCEDURE — 77030011296 HC FCPS GRSP ENDOSC J&J -B: Performed by: SURGERY

## 2021-06-11 PROCEDURE — 77030020829: Performed by: SURGERY

## 2021-06-11 PROCEDURE — 77030010515 HC APPL ENDOCLP LIG J&J -B: Performed by: SURGERY

## 2021-06-11 PROCEDURE — 74011000250 HC RX REV CODE- 250: Performed by: SURGERY

## 2021-06-11 PROCEDURE — 77030016151 HC PROTCTR LNS DFOG COVD -B: Performed by: SURGERY

## 2021-06-11 PROCEDURE — 77030012770 HC TRCR OPT FX AMR -B: Performed by: SURGERY

## 2021-06-11 PROCEDURE — 77010033678 HC OXYGEN DAILY

## 2021-06-11 PROCEDURE — 77030009956 HC RELD ENDOSTCH COVD -B: Performed by: SURGERY

## 2021-06-11 PROCEDURE — C1781 MESH (IMPLANTABLE): HCPCS | Performed by: SURGERY

## 2021-06-11 DEVICE — MESH HERN W10XL10CM MFIL RESRB SQ W/ HYDRGEL BARR PHASIX ST: Type: IMPLANTABLE DEVICE | Site: ABDOMEN | Status: FUNCTIONAL

## 2021-06-11 RX ORDER — GUAIFENESIN/DEXTROMETHORPHAN 100-10MG/5
5 SYRUP ORAL
Status: DISCONTINUED | OUTPATIENT
Start: 2021-06-11 | End: 2021-06-12 | Stop reason: HOSPADM

## 2021-06-11 RX ORDER — FENTANYL CITRATE 50 UG/ML
INJECTION, SOLUTION INTRAMUSCULAR; INTRAVENOUS AS NEEDED
Status: DISCONTINUED | OUTPATIENT
Start: 2021-06-11 | End: 2021-06-11 | Stop reason: HOSPADM

## 2021-06-11 RX ORDER — ROCURONIUM BROMIDE 10 MG/ML
INJECTION, SOLUTION INTRAVENOUS AS NEEDED
Status: DISCONTINUED | OUTPATIENT
Start: 2021-06-11 | End: 2021-06-11 | Stop reason: HOSPADM

## 2021-06-11 RX ORDER — PROPOFOL 10 MG/ML
INJECTION, EMULSION INTRAVENOUS AS NEEDED
Status: DISCONTINUED | OUTPATIENT
Start: 2021-06-11 | End: 2021-06-11 | Stop reason: HOSPADM

## 2021-06-11 RX ORDER — MORPHINE SULFATE 2 MG/ML
1 INJECTION, SOLUTION INTRAMUSCULAR; INTRAVENOUS
Status: DISCONTINUED | OUTPATIENT
Start: 2021-06-11 | End: 2021-06-12 | Stop reason: HOSPADM

## 2021-06-11 RX ORDER — HYDROCODONE BITARTRATE AND ACETAMINOPHEN 7.5; 325 MG/15ML; MG/15ML
5 SOLUTION ORAL
Status: DISCONTINUED | OUTPATIENT
Start: 2021-06-11 | End: 2021-06-12 | Stop reason: HOSPADM

## 2021-06-11 RX ORDER — IPRATROPIUM BROMIDE 42 UG/1
1 SPRAY, METERED NASAL 2 TIMES DAILY
Status: DISCONTINUED | OUTPATIENT
Start: 2021-06-11 | End: 2021-06-11 | Stop reason: ALTCHOICE

## 2021-06-11 RX ORDER — ACETAMINOPHEN 500 MG
1000 TABLET ORAL
Status: COMPLETED | OUTPATIENT
Start: 2021-06-11 | End: 2021-06-11

## 2021-06-11 RX ORDER — GUAIFENESIN/DEXTROMETHORPHAN 100-10MG/5
5 SYRUP ORAL
Qty: 118 ML | Refills: 2 | Status: SHIPPED | OUTPATIENT
Start: 2021-06-11 | End: 2021-07-02

## 2021-06-11 RX ORDER — SODIUM CHLORIDE, SODIUM LACTATE, POTASSIUM CHLORIDE, CALCIUM CHLORIDE 600; 310; 30; 20 MG/100ML; MG/100ML; MG/100ML; MG/100ML
75 INJECTION, SOLUTION INTRAVENOUS CONTINUOUS
Status: DISCONTINUED | OUTPATIENT
Start: 2021-06-11 | End: 2021-06-12 | Stop reason: HOSPADM

## 2021-06-11 RX ORDER — NALOXONE HYDROCHLORIDE 0.4 MG/ML
0.1 INJECTION, SOLUTION INTRAMUSCULAR; INTRAVENOUS; SUBCUTANEOUS AS NEEDED
Status: DISCONTINUED | OUTPATIENT
Start: 2021-06-11 | End: 2021-06-11 | Stop reason: HOSPADM

## 2021-06-11 RX ORDER — METOPROLOL TARTRATE 25 MG/1
25 TABLET, FILM COATED ORAL DAILY
Status: DISCONTINUED | OUTPATIENT
Start: 2021-06-12 | End: 2021-06-12 | Stop reason: HOSPADM

## 2021-06-11 RX ORDER — BUPIVACAINE HYDROCHLORIDE AND EPINEPHRINE 5; 5 MG/ML; UG/ML
INJECTION, SOLUTION EPIDURAL; INTRACAUDAL; PERINEURAL AS NEEDED
Status: DISCONTINUED | OUTPATIENT
Start: 2021-06-11 | End: 2021-06-11 | Stop reason: HOSPADM

## 2021-06-11 RX ORDER — CEFAZOLIN SODIUM/WATER 2 G/20 ML
2 SYRINGE (ML) INTRAVENOUS ONCE
Status: COMPLETED | OUTPATIENT
Start: 2021-06-11 | End: 2021-06-11

## 2021-06-11 RX ORDER — MAGNESIUM SULFATE 100 %
4 CRYSTALS MISCELLANEOUS AS NEEDED
Status: DISCONTINUED | OUTPATIENT
Start: 2021-06-11 | End: 2021-06-11 | Stop reason: HOSPADM

## 2021-06-11 RX ORDER — DEXAMETHASONE SODIUM PHOSPHATE 4 MG/ML
INJECTION, SOLUTION INTRA-ARTICULAR; INTRALESIONAL; INTRAMUSCULAR; INTRAVENOUS; SOFT TISSUE AS NEEDED
Status: DISCONTINUED | OUTPATIENT
Start: 2021-06-11 | End: 2021-06-11 | Stop reason: HOSPADM

## 2021-06-11 RX ORDER — IPRATROPIUM BROMIDE 21 UG/1
2 SPRAY, METERED NASAL
Status: DISCONTINUED | OUTPATIENT
Start: 2021-06-11 | End: 2021-06-12 | Stop reason: HOSPADM

## 2021-06-11 RX ORDER — CELECOXIB 100 MG/1
200 CAPSULE ORAL 2 TIMES DAILY
COMMUNITY

## 2021-06-11 RX ORDER — LOSARTAN POTASSIUM 25 MG/1
25 TABLET ORAL DAILY
Status: DISCONTINUED | OUTPATIENT
Start: 2021-06-12 | End: 2021-06-12 | Stop reason: HOSPADM

## 2021-06-11 RX ORDER — EPHEDRINE SULFATE/0.9% NACL/PF 50 MG/5 ML
SYRINGE (ML) INTRAVENOUS AS NEEDED
Status: DISCONTINUED | OUTPATIENT
Start: 2021-06-11 | End: 2021-06-11 | Stop reason: HOSPADM

## 2021-06-11 RX ORDER — LIDOCAINE HYDROCHLORIDE 20 MG/ML
INJECTION, SOLUTION EPIDURAL; INFILTRATION; INTRACAUDAL; PERINEURAL AS NEEDED
Status: DISCONTINUED | OUTPATIENT
Start: 2021-06-11 | End: 2021-06-11 | Stop reason: HOSPADM

## 2021-06-11 RX ORDER — SODIUM CHLORIDE 0.9 % (FLUSH) 0.9 %
5-40 SYRINGE (ML) INJECTION EVERY 8 HOURS
Status: DISCONTINUED | OUTPATIENT
Start: 2021-06-11 | End: 2021-06-11 | Stop reason: HOSPADM

## 2021-06-11 RX ORDER — ONDANSETRON 2 MG/ML
8 INJECTION INTRAMUSCULAR; INTRAVENOUS
Status: DISCONTINUED | OUTPATIENT
Start: 2021-06-11 | End: 2021-06-12 | Stop reason: HOSPADM

## 2021-06-11 RX ORDER — SODIUM CHLORIDE, SODIUM LACTATE, POTASSIUM CHLORIDE, CALCIUM CHLORIDE 600; 310; 30; 20 MG/100ML; MG/100ML; MG/100ML; MG/100ML
125 INJECTION, SOLUTION INTRAVENOUS CONTINUOUS
Status: DISCONTINUED | OUTPATIENT
Start: 2021-06-11 | End: 2021-06-12 | Stop reason: SDUPTHER

## 2021-06-11 RX ORDER — HYDROCODONE BITARTRATE AND ACETAMINOPHEN 7.5; 325 MG/15ML; MG/15ML
10 SOLUTION ORAL
Qty: 236 ML | Refills: 0 | Status: SHIPPED | OUTPATIENT
Start: 2021-06-11 | End: 2021-06-16

## 2021-06-11 RX ORDER — AMLODIPINE BESYLATE 5 MG/1
10 TABLET ORAL DAILY
Status: DISCONTINUED | OUTPATIENT
Start: 2021-06-12 | End: 2021-06-12 | Stop reason: HOSPADM

## 2021-06-11 RX ORDER — HYDROMORPHONE HYDROCHLORIDE 1 MG/ML
0.5 INJECTION, SOLUTION INTRAMUSCULAR; INTRAVENOUS; SUBCUTANEOUS
Status: DISCONTINUED | OUTPATIENT
Start: 2021-06-11 | End: 2021-06-11 | Stop reason: HOSPADM

## 2021-06-11 RX ORDER — FENTANYL CITRATE 50 UG/ML
25 INJECTION, SOLUTION INTRAMUSCULAR; INTRAVENOUS AS NEEDED
Status: DISCONTINUED | OUTPATIENT
Start: 2021-06-11 | End: 2021-06-11 | Stop reason: HOSPADM

## 2021-06-11 RX ORDER — ONDANSETRON 8 MG/1
8 TABLET, ORALLY DISINTEGRATING ORAL
Qty: 20 TABLET | Refills: 1 | Status: SHIPPED | OUTPATIENT
Start: 2021-06-11 | End: 2021-09-29

## 2021-06-11 RX ORDER — SULFAMETHOXAZOLE AND TRIMETHOPRIM 800; 160 MG/1; MG/1
1 TABLET ORAL DAILY
Status: DISCONTINUED | OUTPATIENT
Start: 2021-06-12 | End: 2021-06-12 | Stop reason: HOSPADM

## 2021-06-11 RX ORDER — DEXTROSE 50 % IN WATER (D50W) INTRAVENOUS SYRINGE
25-50 AS NEEDED
Status: DISCONTINUED | OUTPATIENT
Start: 2021-06-11 | End: 2021-06-11 | Stop reason: HOSPADM

## 2021-06-11 RX ORDER — ONDANSETRON 2 MG/ML
INJECTION INTRAMUSCULAR; INTRAVENOUS AS NEEDED
Status: DISCONTINUED | OUTPATIENT
Start: 2021-06-11 | End: 2021-06-11 | Stop reason: HOSPADM

## 2021-06-11 RX ORDER — SODIUM CHLORIDE, SODIUM LACTATE, POTASSIUM CHLORIDE, CALCIUM CHLORIDE 600; 310; 30; 20 MG/100ML; MG/100ML; MG/100ML; MG/100ML
INJECTION, SOLUTION INTRAVENOUS
Status: DISCONTINUED | OUTPATIENT
Start: 2021-06-11 | End: 2021-06-11 | Stop reason: HOSPADM

## 2021-06-11 RX ORDER — MONTELUKAST SODIUM 10 MG/1
10 TABLET ORAL DAILY
Status: DISCONTINUED | OUTPATIENT
Start: 2021-06-12 | End: 2021-06-12 | Stop reason: HOSPADM

## 2021-06-11 RX ORDER — ALLOPURINOL 300 MG/1
300 TABLET ORAL DAILY
Status: DISCONTINUED | OUTPATIENT
Start: 2021-06-12 | End: 2021-06-12 | Stop reason: HOSPADM

## 2021-06-11 RX ORDER — METOCLOPRAMIDE HYDROCHLORIDE 5 MG/ML
10 INJECTION INTRAMUSCULAR; INTRAVENOUS ONCE
Status: DISCONTINUED | OUTPATIENT
Start: 2021-06-11 | End: 2021-06-11 | Stop reason: HOSPADM

## 2021-06-11 RX ORDER — SODIUM CHLORIDE 0.9 % (FLUSH) 0.9 %
5-40 SYRINGE (ML) INJECTION AS NEEDED
Status: DISCONTINUED | OUTPATIENT
Start: 2021-06-11 | End: 2021-06-11 | Stop reason: HOSPADM

## 2021-06-11 RX ORDER — MIDAZOLAM HYDROCHLORIDE 1 MG/ML
INJECTION, SOLUTION INTRAMUSCULAR; INTRAVENOUS AS NEEDED
Status: DISCONTINUED | OUTPATIENT
Start: 2021-06-11 | End: 2021-06-11 | Stop reason: HOSPADM

## 2021-06-11 RX ADMIN — PHENYLEPHRINE HYDROCHLORIDE 100 MCG: 10 INJECTION INTRAVENOUS at 12:27

## 2021-06-11 RX ADMIN — LIDOCAINE HYDROCHLORIDE 100 MG: 20 INJECTION, SOLUTION EPIDURAL; INFILTRATION; INTRACAUDAL; PERINEURAL at 09:58

## 2021-06-11 RX ADMIN — ACETAMINOPHEN 1000 MG: 500 TABLET ORAL at 09:46

## 2021-06-11 RX ADMIN — PHENYLEPHRINE HYDROCHLORIDE 100 MCG: 10 INJECTION INTRAVENOUS at 10:20

## 2021-06-11 RX ADMIN — SODIUM CHLORIDE, SODIUM LACTATE, POTASSIUM CHLORIDE, AND CALCIUM CHLORIDE: 600; 310; 30; 20 INJECTION, SOLUTION INTRAVENOUS at 09:52

## 2021-06-11 RX ADMIN — ROCURONIUM BROMIDE 20 MG: 10 INJECTION, SOLUTION INTRAVENOUS at 10:38

## 2021-06-11 RX ADMIN — SODIUM CHLORIDE, SODIUM LACTATE, POTASSIUM CHLORIDE, AND CALCIUM CHLORIDE 75 ML/HR: 600; 310; 30; 20 INJECTION, SOLUTION INTRAVENOUS at 14:41

## 2021-06-11 RX ADMIN — FENTANYL CITRATE 25 MCG: 50 INJECTION, SOLUTION INTRAMUSCULAR; INTRAVENOUS at 12:59

## 2021-06-11 RX ADMIN — PHENYLEPHRINE HYDROCHLORIDE 100 MCG: 10 INJECTION INTRAVENOUS at 11:42

## 2021-06-11 RX ADMIN — PHENYLEPHRINE HYDROCHLORIDE 100 MCG: 10 INJECTION INTRAVENOUS at 10:13

## 2021-06-11 RX ADMIN — PHENYLEPHRINE HYDROCHLORIDE 200 MCG: 10 INJECTION INTRAVENOUS at 10:09

## 2021-06-11 RX ADMIN — SODIUM CHLORIDE, SODIUM LACTATE, POTASSIUM CHLORIDE, AND CALCIUM CHLORIDE 125 ML/HR: 600; 310; 30; 20 INJECTION, SOLUTION INTRAVENOUS at 07:25

## 2021-06-11 RX ADMIN — PHENYLEPHRINE HYDROCHLORIDE 400 MCG: 10 INJECTION INTRAVENOUS at 10:25

## 2021-06-11 RX ADMIN — ROCURONIUM BROMIDE 5 MG: 10 INJECTION, SOLUTION INTRAVENOUS at 12:36

## 2021-06-11 RX ADMIN — FENTANYL CITRATE 50 MCG: 50 INJECTION, SOLUTION INTRAMUSCULAR; INTRAVENOUS at 09:58

## 2021-06-11 RX ADMIN — ROCURONIUM BROMIDE 20 MG: 10 INJECTION, SOLUTION INTRAVENOUS at 11:29

## 2021-06-11 RX ADMIN — MORPHINE SULFATE 1 MG: 2 INJECTION, SOLUTION INTRAMUSCULAR; INTRAVENOUS at 17:05

## 2021-06-11 RX ADMIN — CEFAZOLIN 2 G: 1 INJECTION, POWDER, FOR SOLUTION INTRAVENOUS at 10:08

## 2021-06-11 RX ADMIN — Medication 10 MG: at 10:50

## 2021-06-11 RX ADMIN — ONDANSETRON HYDROCHLORIDE 4 MG: 2 INJECTION INTRAMUSCULAR; INTRAVENOUS at 12:58

## 2021-06-11 RX ADMIN — PROPOFOL 120 MG: 10 INJECTION, EMULSION INTRAVENOUS at 09:58

## 2021-06-11 RX ADMIN — FENTANYL CITRATE 25 MCG: 50 INJECTION, SOLUTION INTRAMUSCULAR; INTRAVENOUS at 13:09

## 2021-06-11 RX ADMIN — DEXAMETHASONE SODIUM PHOSPHATE 4 MG: 4 INJECTION, SOLUTION INTRAMUSCULAR; INTRAVENOUS at 10:13

## 2021-06-11 RX ADMIN — Medication 5 MG: at 10:25

## 2021-06-11 RX ADMIN — MIDAZOLAM 2 MG: 1 INJECTION INTRAMUSCULAR; INTRAVENOUS at 09:52

## 2021-06-11 RX ADMIN — LIDOCAINE HYDROCHLORIDE 100 MG: 20 INJECTION, SOLUTION EPIDURAL; INFILTRATION; INTRACAUDAL; PERINEURAL at 13:10

## 2021-06-11 RX ADMIN — Medication 5 MG: at 11:35

## 2021-06-11 RX ADMIN — SODIUM CHLORIDE, SODIUM LACTATE, POTASSIUM CHLORIDE, AND CALCIUM CHLORIDE: 600; 310; 30; 20 INJECTION, SOLUTION INTRAVENOUS at 10:07

## 2021-06-11 RX ADMIN — PHENYLEPHRINE HYDROCHLORIDE 200 MCG: 10 INJECTION INTRAVENOUS at 10:24

## 2021-06-11 RX ADMIN — ROCURONIUM BROMIDE 50 MG: 10 INJECTION, SOLUTION INTRAVENOUS at 10:00

## 2021-06-11 RX ADMIN — SODIUM CHLORIDE, SODIUM LACTATE, POTASSIUM CHLORIDE, AND CALCIUM CHLORIDE: 600; 310; 30; 20 INJECTION, SOLUTION INTRAVENOUS at 12:20

## 2021-06-11 RX ADMIN — ROCURONIUM BROMIDE 5 MG: 10 INJECTION, SOLUTION INTRAVENOUS at 11:57

## 2021-06-11 RX ADMIN — PHENYLEPHRINE HYDROCHLORIDE 200 MCG: 10 INJECTION INTRAVENOUS at 12:57

## 2021-06-11 RX ADMIN — Medication 10 MG: at 12:43

## 2021-06-11 NOTE — OP NOTES
[facility]  OPER[facility]    Name:  Akira Lisa  MR#:   [id number]  :  1950  ACCOUNT #:  [de-identified]  DATE OF SERVICE:  2021    PREOPERATIVE DIAGNOSIS:  Large hiatal hernia with gastroesophageal reflux disease. POSTOPERATIVE DIAGNOSIS:  Large hiatal hernia with gastroesophageal reflux disease. PROCEDURES PERFORMED:  1. Laparoscopic hiatal hernia repair with mesh. 2.  Laparoscopic Nissen fundoplication. 3.  Gastropexy. SURGEON:  David Hollis MD    ASSISTANT:  None. ANESTHESIA:  General.    COMPLICATIONS:  None. SPECIMENS REMOVED:  None (a hiatal hernia sac removed but not sent for permanent pathology). IMPLANTS:  Phasix ST cut to size and shape, posterior hiatal hernia patch. ESTIMATED BLOOD LOSS:  Minimal.    DRAINS:  None. PROCEDURE:  The patient is a 60-year-old white male referred to me from primary care and Gastroenterology for a large hiatal hernia with associated reflux. He wished to have it surgically repaired. The nature of surgery, alternatives, benefits, and risks were explained. Consent was obtained, and he was taken to the OR on 2021 after being cleared by his cardiologist, Dr. Ranjana Reich. He was met and identified in the preoperative holding area. He was then brought to the operating room. Preoperative intravenous Ancef 2 g was given per protocol. He was initially positioned on the table in a supine position with all pressure points appropriately padded. Sequential compression devices were applied. General anesthesia was administered with monitors and oxygen placed. Sterile intraoperative Hook was inserted without any difficulty. It was removed at the end of the procedure without blood or any other issues noted. He was then placed now on the bean bag. He was placed in the low lithotomy position with padded Harry stirrups with all pressure points appropriately padded.   The area was shaved with electric clippers, prepped and draped in the usual sterile fashion. Orogastric tube was inserted by Anesthesia without any difficulty for the first part of the procedure. After a surgical pause, we began the procedure by first locally anesthetizing the site. We made a 2-cm transverse supraumbilical incision several centimeters above the umbilicus and then centered just to the left of midline with a 15-blade through skin and dermis. The underlying subcutaneous tissue was Bovied and bluntly dissected down to the fascia. The anterior sheath was divided transversely a centimeter in length to expose the underlying left rectus muscle. This was retracted laterally to expose the posterior sheath and peritoneum. These were grasped between clamps, cut using Metzenbaum scissors. A 0 Vicryl stay suture was placed on the lateral aspect of this opening. Peritoneal cavity was entered. There were no adhesions in this area. Kamari trocar was inserted. Balloon was inflated. Pneumoperitoneum was established to a pressure of 15 mmHg. A 30-degree laparoscope was inserted. There was no evidence of any trauma to the underlying abdominal viscera. There were no adhesions in the area. At the insertion, there were adhesions starting several centimeters below this from his previous umbilical hernia repair with mesh. Next, right upper quadrant, epigastric, left upper quadrant subcostal, and left flank subcostal 5-mm incisions were made. Through these, 5-mm bladeless trocars were inserted, all under laparoscopic vision. We then removed the epigastric trocar and replaced it with a small Chanelle retractor. We placed it gently behind the left lobe of liver, used it to elevate it towards the anterior abdominal wall, and secured using side table clamp. The patient was then placed in moderate reverse Trendelenburg position. Bean bag had been inflated at the beginning of the case. We then began the procedure.   First, we dissected the gastrosplenic ligament taking the short gastric vessels starting long-term up the greater curvature of the stomach all the way up to the left sawyer. We freed up the hiatal hernia sac attachments to the left sawyer starting posteriorly and coming over anteriorly using Harmonic scalpel dissection. All dissections including short gastrics were done with Harmonic scalpel dissection. We then dissected the gastrohepatic ligament in similar fashion using Harmonic scalpel dissection. The gastric was clipped and divided with two 10-mm clips on the stomach side, two 10-mm clips on the liver side, and divided with harmonic scalpel. This was necessary to allow adequate mobilization of the stomach as well as visualization of the right sawyer. We continued dissecting the gastrohepatic ligament up to the right sawyer,  the hiatal hernia sac attachments to the right sawyer circumferentially. Using the Harmonic scalpel dissection, we then used the hernia sac itself to bring the remainder of the stomach intra-abdominally. Once the esophagus had been mobilized circumferentially and the hernia sac had been completely dissected out of the mediastinum, we removed the hernia sac as completely as possible using Harmonic scalpel dissection and placed it into an Endo Catch bag, which was necessary because of the size of the hernia sac. We removed it, did not send it for permanent pathology. We then continued mobilizing the distal esophagus circumferentially to allow an adequate intra-abdominal length of esophagus to be present for fundoplication. We encircled the esophagus at the GE junction with a one-inch Penrose drain, then closed the hiatus posteriorly with interrupted 0 Surgidac pledgeted sutures. This was done after the esophagus had been gently dilated after removal of the orogastric tube with a 40-Tamazight lubricated tapered bougie followed by a 52-Tamazight tapered lubricated bougie.   The hiatal closure was done posteriorly as well as one anterior stitch so that it was snug but not constricting around the 52-Stateless dilated esophagus. Because of the large size of the hiatal hernia, I felt that mesh reinforcement was absolutely necessary. Phasix ST mesh was brought into the field sterilely. After measuring the appropriate dimensions intra-abdominally, I cut it to the appropriate size and \"heart\" shape. It was then placed intra-abdominally, oriented appropriately, and secured on either side of the diaphragm using multi-feed/multi-fire low profile staples. We then placed a single stitch on the posterior aspect of the fundus using 0 Surgidac suture, and this was used to bring the posterior portion of the fundus through the retroesophageal window and approximate it to the anterior portion of the fundus at the 10 o'clock position. This was all done again while the esophagus was dilated with a 52-Stateless bougie. We then completed a loose and floppy fundoplication with four interrupted 0 Surgidac pledgeted sutures with the cephalad most suture being used to secure the completed fundoplication to the intra-abdominal esophagus and the caudal most suture being used to secure it to the imbricated stomach. At this point, the bougie was removed without any difficulty. There was no blood on the bougie noted. We then secured the completed loose and floppy fundoplication at the 9 o'clock position between the posterior portion of the fundoplication and the right sawyer and at the 12 o'clock position between the anterior portion of the fundoplication and the diaphragm. Both of this was done with 0 Surgidac pledgeted suture. Final maneuver was a transfascial gastropexy. This was done after securing a figure-of-eight 0 Surgidac suture to the body of the stomach and bringing it up to the anterior abdominal wall with the pressure lowered to 8 mmHg. This was done so that it was snug but not on any undue tension.   With the pressure now lowered to 6 mmHg, I made a small 11-blade nick incision and did a transfascial gastropexy fixation, all under laparoscopic vision. All this point, the [facility] retractor was removed from behind the left lobe of liver. There was no evidence of any iatrogenic injury to liver, spleen, or other abdominal viscera during any portion of the procedure. The patient was placed back in level position. Pneumoperitoneum was evacuated through the supraumbilical trocar. This incision was then closed in multiple layers starting with the peritoneum and posterior sheath with a figure-of-eight 0 Vicryl suture under direct vision, followed by the anterior sheath with a figure-of-eight 0 Vicryl suture, interrupted 3-0 Vicryl dermal sutures, and running 4-0 Monocryl subcuticular suture to close skin. Pneumoperitoneum was then reestablished through the 5-mm trocars, which I had left in place. I examined the repair of the supraumbilical incision, and it was intact from within and airtight, not involving any other abdominal viscera. Pneumoperitoneum was then released one final time. The remaining 5-mm trocars were all removed. Hemostasis was satisfactory at all sites. All 5-mm trocar sites were closed with 4-0 Monocryl subcuticular suture. The small 11-blade nick incision was closed with a simple 4-0 Monocryl suture. All closed incisions were then cleaned, dried, and dressed with Dermabond. The patient tolerated the entire procedure without incident. Hook was removed at the end of the case without any difficulty. He was extubated in the OR and taken to recovery room postoperatively in stable condition. Ivy Prieto MD      [id number]  D:  06/11/2021 13:52  T:  06/11/2021 19:39  JOB #:  [id number]  CC:  Naveed Spencer MD       [facility].  MD Vale Head MD

## 2021-06-11 NOTE — PROGRESS NOTES
Transition of Care ([place]) Plan:    Home with physician follow up     Chart reviewed. Pt admitted for an elective surgical procedure (LAPAROSCOPIC HIATAL HERNIA REPAIR POSSIBLY WITH MESH, NISSEN FUNDOPLICATION WITH GASTROPEXY \"SPEC POP\"). Pt is independent and lives with his significant other, Gyu Perla. Pt does have access to a RW if needed. Pt's significant other will transport pt home upon discharge. Please encourage ambulation as appropriate. No transition of care needs identified at this time. Anticipate pt will be medically stable for discharge within the next 24-48 hours with physician follow up. CM available to assist as needed. [place] Transportation:   How is patient being transported at discharge? Family/Friend      When? Once cleared by physician     Is transport scheduled? N/A      Follow-up appointment and transportation:   PCP/Specialist?  See AVS for Appointment         Who is transporting to the follow-up appointment? Self/Family/Friend      Is transport for follow up appointment scheduled? N/A    Communication plan (with patient/family): Who is being called? Patient or Next of Kin? Responsible party? Patient      What number(s) is to be used? See Facesheet      What service provider is calling for [facility] services? When are they calling? Readmission Risk? (Green/Low; Yellow/Moderate; Red/High):  [employer] here to complete [address] including selection of the Healthcare Decision Maker Relationship (ie \"Primary\")    Care Management Interventions  Mode of Transport at Discharge: Other (see comment) (Family/friend)  Transition of Care Consult (CM Consult): Discharge Planning  Health Maintenance Reviewed: Yes  Current Support Network:  Other, Family Lives Nearby (lives with significant other)  Confirm Follow Up Transport: Self  The Plan for Transition of Care is Related to the Following Treatment Goals : Home with physician follow up   Discharge Location  Discharge Placement: Home with family assistance

## 2021-06-11 NOTE — PROGRESS NOTES
Problem: Falls - Risk of  Goal: *Absence of Falls  Description: Document Cory Ballard Fall Risk and appropriate interventions in the flowsheet.   Outcome: Progressing Towards Goal  Note: Fall Risk Interventions:            Medication Interventions: Patient to call before getting OOB, Teach patient to arise slowly                   Problem: Patient Education: Go to Patient Education Activity  Goal: Patient/Family Education  Outcome: Progressing Towards Goal

## 2021-06-11 NOTE — BRIEF OP NOTE
Brief Postoperative Note    Patient: Saige Lindsey. YOB: 1950  MRN: [id number]    Date of Procedure: 6/11/2021     Pre-Op Diagnosis: HIATAL HERNIA, GERD    Post-Op Diagnosis: Same as preoperative diagnosis. Procedure(s):  LAPAROSCOPIC HIATAL HERNIA REPAIR WITH MESH, NISSEN FUNDOPLICATION, GASTROPEXY     Surgeon(s):  Star Crespo MD    Surgical Assistant: Surg Asst-1: Yoselin Maldonado    Anesthesia: General     Estimated Blood Loss (mL): Minimal    Complications: None    Specimens: * No specimens in log *     Implants:   Implant Name Type Inv.  Item Serial No.  Lot No. LRB No. Used Action   MESH KENDALL [id number] MFIL RESRB SQ W/ HYDRGEL CASTRO PHASIX ST - [id number]  MESH KENDALL [id number] MFIL RESRB SQ W/ HYDRGEL CASTRO PHASIX ST  [place] [employer]WD [id number] N/A 1 Implanted       Drains: * No LDAs found *    Findings: LARGE HIATAL HERNIA    Electronically Signed by Kate Lorenzo MD on 6/11/2021 at 1:52 PM    Op note dictated #[id number]  RP

## 2021-06-11 NOTE — INTERVAL H&P NOTE
Update History & Physical    The Patient's History and Physical of June 11, 2021 was reviewed with the patient and I examined the patient. There was no change. The surgical site was confirmed by the patient and me. Plan:  The risk, benefits, expected outcome, and alternative to the recommended procedure have been discussed with the patient. Patient understands and wants to proceed with the procedure.     Electronically signed by Tereza Beckman MD on 6/11/2021 at 9:23 AM

## 2021-06-11 NOTE — PERIOP NOTES
Reviewed PTA medication list with patient/caregiver and patient/caregiver denies any additional medications. Patient admits to having a responsible adult care for them at home for at least 24 hours after surgery. Patient encouraged to use gown warming system and informed that using said warming gown to regulate body temperature prior to a procedure has been shown to help reduce the risks of blood clots and infection. Patient's pharmacy of choice verified and documented in PTA medication section. Dual skin assessment & fall risk band verification completed with Sandhya Ambriz RN.

## 2021-06-11 NOTE — ANESTHESIA PREPROCEDURE EVALUATION
Relevant Problems   RESPIRATORY SYSTEM   (+) SHAILESH (obstructive sleep apnea)      NEUROLOGY   (+) Depression      CARDIOVASCULAR   (+) Aortic stenosis   (+) Essential hypertension      GASTROINTESTINAL   (+) GERD (gastroesophageal reflux disease)      RENAL FAILURE   (+) Calculus of both kidneys      ENDOCRINE   (+) Rheumatoid arthritis (HCC)      HEMATOLOGY   (+) Osteomyelitis of ankle or foot, acute, left (HCC)       Anesthetic History   No history of anesthetic complications            Review of Systems / Medical History  Patient summary reviewed, nursing notes reviewed and pertinent labs reviewed    Pulmonary        Sleep apnea: CPAP      Pertinent negatives: No pneumonia, COPD, asthma, shortness of breath, recent URI and smoker     Neuro/Psych         TIA  Pertinent negatives: No seizures, neuromuscular disease, CVA, headaches, psychiatric history and dementia   Cardiovascular      Valvular problems/murmurs: aortic stenosis        PAD  Pertinent negatives: No pacemaker, complex congenital heart defect, past MI, CAD, dysrhythmias, angina, CHF, CABG, cardiac stents and hyperlipidemia  Exercise tolerance: >4 METS  Comments: S/p TAVR 2016, cardiac clearance obtained from cardiologist. +B blocker this morning.    GI/Hepatic/Renal           Hiatal hernia  Pertinent negatives: No PUD, hepatitis and renal disease   Endo/Other          Pertinent negatives: No diabetes, hypothyroidism, hyperthyroidism, obesity, morbid obesity, blood dyscrasia, cancer and anemia   Other Findings              Physical Exam    Airway    TM Distance: 4 - 6 cm  Neck ROM: normal range of motion   Mouth opening: Normal     Cardiovascular    Rhythm: regular  Rate: normal    Murmur: Grade 2, Aortic area     Dental  No notable dental hx       Pulmonary  Breath sounds clear to auscultation               Abdominal  GI exam deferred       Other Findings            Anesthetic Plan    ASA: 3  Anesthesia type: general    Monitoring Plan: Continuous noninvasive hemodynamic monitoring      Induction: Intravenous  Anesthetic plan and risks discussed with: Patient

## 2021-06-11 NOTE — ANESTHESIA POSTPROCEDURE EVALUATION
Procedure(s):  LAPAROSCOPIC HIATAL HERNIA REPAIR POSSIBLY WITH MESH, NISSEN FUNDOPLICATION WITH GASTROPEXY \"SPEC POP\". general    Anesthesia Post Evaluation      Multimodal analgesia: multimodal analgesia used between 6 hours prior to anesthesia start to PACU discharge  Patient location during evaluation: bedside  Patient participation: complete - patient participated  Level of consciousness: awake and awake and alert  Pain score: 1  Pain management: adequate  Airway patency: patent  Anesthetic complications: no  Cardiovascular status: acceptable, blood pressure returned to baseline, hemodynamically stable and stable  Respiratory status: acceptable, nasal cannula, spontaneous ventilation and nonlabored ventilation  Hydration status: euvolemic  Post anesthesia nausea and vomiting:  none  Final Post Anesthesia Temperature Assessment:  Normothermia (36.0-37.5 degrees C)      INITIAL Post-op Vital signs:   Vitals Value Taken Time   /63 06/11/21 1400   Temp     Pulse 97 06/11/21 1404   Resp 17 06/11/21 1404   SpO2 99 % 06/11/21 1404   Vitals shown include unvalidated device data.

## 2021-06-11 NOTE — PERIOP NOTES
Paged Dr. Rebekah Jones for patient sign out. Patient meets criteria for transfer to the next phase of care.

## 2021-06-11 NOTE — PERIOP NOTES
TRANSFER - OUT REPORT:    Verbal report given to 1160 Jesus Darling RN(name) on [employer].  being transferred to [facility](unit) for routine post - op       Report consisted of patients Situation, Background, Assessment and   Recommendations(SBAR). Information from the following report(s) SBAR, Kardex, Intake/Output and Cardiac Rhythm NSR was reviewed with the receiving nurse. Lines:   Peripheral IV 58/56/49 Left Cephalic (Active)   Site Assessment Clean, dry, & intact 06/11/21 1340   Phlebitis Assessment 0 06/11/21 1340   Infiltration Assessment 0 06/11/21 1340   Dressing Status Clean, dry, & intact 06/11/21 1340   Dressing Type Transparent;Tape 06/11/21 1340   Hub Color/Line Status Pink; Infusing;Patent 06/11/21 1340   Alcohol Cap Used No 06/11/21 0730        Opportunity for questions and clarification was provided.       Patient transported with:   O2 @ 3 liters  Registered Nurse  [facility]

## 2021-06-12 VITALS
HEART RATE: 89 BPM | HEIGHT: 68 IN | TEMPERATURE: 99.8 F | SYSTOLIC BLOOD PRESSURE: 140 MMHG | BODY MASS INDEX: 27.46 KG/M2 | WEIGHT: 181.2 LBS | RESPIRATION RATE: 20 BRPM | OXYGEN SATURATION: 95 % | DIASTOLIC BLOOD PRESSURE: 79 MMHG

## 2021-06-12 PROCEDURE — 74011250637 HC RX REV CODE- 250/637: Performed by: SURGERY

## 2021-06-12 PROCEDURE — 74011250636 HC RX REV CODE- 250/636: Performed by: SURGERY

## 2021-06-12 PROCEDURE — 99218 HC RM OBSERVATION: CPT

## 2021-06-12 RX ADMIN — HYDROCODONE BITARTRATE AND ACETAMINOPHEN 5 MG: 7.5; 325 SOLUTION ORAL at 09:55

## 2021-06-12 RX ADMIN — AMLODIPINE BESYLATE 10 MG: 5 TABLET ORAL at 09:49

## 2021-06-12 RX ADMIN — ALLOPURINOL 300 MG: 300 TABLET ORAL at 09:50

## 2021-06-12 RX ADMIN — SULFAMETHOXAZOLE AND TRIMETHOPRIM 1 TABLET: 800; 160 TABLET ORAL at 09:49

## 2021-06-12 RX ADMIN — METOPROLOL TARTRATE 25 MG: 25 TABLET, FILM COATED ORAL at 09:50

## 2021-06-12 RX ADMIN — LOSARTAN POTASSIUM 25 MG: 25 TABLET, FILM COATED ORAL at 09:50

## 2021-06-12 RX ADMIN — MONTELUKAST 10 MG: 10 TABLET, FILM COATED ORAL at 09:50

## 2021-06-12 RX ADMIN — SODIUM CHLORIDE, SODIUM LACTATE, POTASSIUM CHLORIDE, AND CALCIUM CHLORIDE 75 ML/HR: 600; 310; 30; 20 INJECTION, SOLUTION INTRAVENOUS at 02:52

## 2021-06-12 NOTE — DISCHARGE INSTRUCTIONS
Post-Operative Discharge Instructions  Gilford Senruss. Shaheen Sahu M.D.  [address] [place] [address]  [phone]    Patient: Mel Trivedi MRN: [id number]  CSN: [id number]    YOB: 1950  Age: 72 y.o. Sex: male    DOA: 6/11/2021 LOS:  LOS: 0 days   Discharge Date:      Acute Diagnoses:  S/P laparoscopic fundoplication [M80.923]  Hiatal hernia with gastroesophageal reflux [K21.9, K44.9]  HTN (hypertension) [I10]  Asthma [J45.909]    Chronic Medical Diagnoses:  Problem List as of 6/12/2021 Date Reviewed: 12/3/2020        Codes Class Noted - Resolved    Enlarged prostate with lower urinary tract symptoms (LUTS) ICD-10-CM: N40.1  ICD-9-CM: 600.01  12/4/2016 - Present        Calculus of both kidneys ICD-10-CM: N20.0  ICD-9-CM: 592.0  12/4/2016 - Present        Male erectile dysfunction, unspecified ICD-10-CM: N52.9  ICD-9-CM: 607.84  12/18/2017 - Present        HTN (hypertension) ICD-10-CM: I10  ICD-9-CM: 401.9  6/11/2021 - Present        Asthma ICD-10-CM: J45.909  ICD-9-CM: 493.90  6/11/2021 - Present        S/P laparoscopic fundoplication I-67-TS: E44.350  ICD-9-CM: V45.89  6/11/2021 - Present        Hiatal hernia with gastroesophageal reflux ICD-10-CM: K21.9, K44.9  ICD-9-CM: 530.81, 553.3  6/11/2021 - Present        Infection and inflammatory reaction due to other internal orthopedic prosthetic devices, implants and grafts, initial encounter ([facility] ICD-10-CM: T84. 7XXA  ICD-9-CM: 996.67  1/29/2020 - Present        Rheumatoid aortitis ICD-10-CM: I01.1  ICD-9-CM: 714.89  1/27/2020 - Present        Osteomyelitis of ankle or foot, acute, left ([address] ICD-10-CM: M86.172  ICD-9-CM: 730.07  1/27/2020 - Present        Cellulitis and abscess of lower extremity ICD-10-CM: L03.119, L02.419  ICD-9-CM: 682.6  1/27/2020 - Present        Cellulitis ICD-10-CM: L03.90  ICD-9-CM: 682.9  7/8/2019 - Present        Cellulitis of foot, left ICD-10-CM: L03.116  ICD-9-CM: 682.7  7/8/2019 - Present Sensorineural hearing loss (SNHL) of both ears ICD-10-CM: H90.3  ICD-9-CM: 389.18  10/31/2018 - Present        Chronic maxillary sinusitis ICD-10-CM: J32.0  ICD-9-CM: 473.0  10/24/2018 - Present    Overview Signed 12/18/2018  3:11 PM by Vicky Arellano     Overview:   Added automatically from request for surgery [id number]             Deviated nasal septum ICD-10-CM: J34.2  ICD-9-CM: 470  9/27/2018 - Present        Chronic allergic rhinitis ICD-10-CM: J30.9  ICD-9-CM: 477.9  8/16/2018 - Present        SHAILESH (obstructive sleep apnea) ICD-10-CM: G47.33  ICD-9-CM: 327.23  8/16/2018 - Present        Rheumatoid arthritis ([address] ICD-10-CM: M06.9  ICD-9-CM: 714.0  12/5/2016 - Present        Essential hypertension ICD-10-CM: I10  ICD-9-CM: 401.9  12/5/2016 - Present    Overview Signed 12/18/2018  3:11 PM by Vicky Arellano     Last Assessment & Plan:   Well controlled on present therapy             GERD (gastroesophageal reflux disease) ICD-10-CM: K21.9  ICD-9-CM: 530.81  12/5/2016 - Present        Disorder of bone and cartilage, unspecified ICD-10-CM: M89.9, M94.9  ICD-9-CM: 733.90  12/5/2016 - Present        Depression ICD-10-CM: F32.9  ICD-9-CM: 311  12/5/2016 - Present        Aortic stenosis ICD-10-CM: I35.0  ICD-9-CM: 424.1  12/5/2016 - Present        Gout ICD-10-CM: M10.9  ICD-9-CM: 274.9  12/5/2016 - Present        Impaired fasting blood sugar ICD-10-CM: R73.01  ICD-9-CM: 790.21  12/5/2016 - Present        History of aortic valve replacement ICD-10-CM: Z95.2  ICD-9-CM: V43.3  11/23/2016 - Present    Overview Signed 12/18/2018  3:11 PM by Vicky Arellano     Last Assessment & Plan:   Echocardiogram done in December of 2017 showed that the aortic valve was well-seated. Multiple-type hyperlipidemia ICD-10-CM: E78.2  ICD-9-CM: 272.4  6/2/2016 - Present    Overview Signed 12/18/2018  3:11 PM by Vicky Arellano     Last Assessment & Plan:   On Lipitor 40.   Lipids are managed by his PCP Anxiety state ICD-10-CM: F41.1  ICD-9-CM: 300.00  6/3/2009 - Present              Diet  1. Resume prior to surgery diet as tolerated. Activity  1. Do not drive a car or operate any hazardous machinery the day of surgery. 2. Rest quietly today. 3. No bending or heavy lifting. 4. You may resume other prior to surgery activities as tolerated. 5. You may remove the bandage and shower in 1 day. Drain / Wound Care  1. Follow all drain / wound care instructions exactly as explained by the Nurse at time of discharge. 2. Apply an ice pack to the surgical site for 48 hours. 3. Do not put any salves or ointments on the wound. Allow it to form a dry scab. 4. Leave steri-strips / Dermabond alone. They should be allowed to fall off on their own in 7-14 days. Medications  1. It is important to take your medications exactly as they are prescribed. 2. Keep your medication in the bottles provided by the pharmacist, and keep a list of the medication names, dosages, and times they should be taken in your wallet. Call 911 anytime you think you may need emergency care. For example, call if:  · You passed out (lost consciousness). · You have severe trouble breathing. · You have sudden chest pain and shortness of breath. Notify your Surgeon for any of the followin. Fever, chills, nausea, vomiting, severe abdominal pain or bleeding. 2. If you experience any redness or discharge or sign of infection. 3. Persistent nausea lasting more than 24 hours. If you are unable to reach your Surgeon for any of the symptoms above, you should proceed directly to the nearest Emergency Department. Post-Operative Appointment Information    Call Dr. Toña Wilkinson office tomorrow morning at [phone] to schedule a post-operative office visit in one (1) week. If any questions or concerns arise, call your Surgeon at [phone].

## 2021-06-12 NOTE — PROGRESS NOTES
Summary -- Pt comfortable throughout shift. Ambulating frequently and without difficulty. Compliant with and engaged in [address]. Supportive family at bedside. Tolerating PO well. Minimals c/o pain; responsive to hycet. No new concerns/complaints. Discharged education and med regimen reviewed with patient and family. Meds picked up from hospital pharmacy yesterday. Patient and family able to provide teachback.     Patient Vitals for the past 12 hrs:   Temp Pulse Resp BP SpO2   06/12/21 1035 -- -- -- -- 95 %   06/12/21 0802 99.8 °F (37.7 °C) 89 20 (!) 140/79 95 %   06/12/21 0254 98.6 °F (37 °C) 93 16 138/77 97 %

## 2021-06-12 NOTE — PROGRESS NOTES
1930 - Bedside and Verbal shift change report given to Osvaldo Cristobal RN (oncoming nurse) by FANI Guerra RN (offgoing nurse). Report included the following information SBAR, Kardex, Intake/Output, MAR and Recent Results. Shift summary -- Denied abd pain. Tolerated diet without n/v. Wore CPAP overnight. Able to tolerate 2000 ml on incentive spirometer. Ambulated hallway regularly without assistance; reported belching but not passing flatus. 2060 - Bedside and Verbal shift change report given to FRANK Bernal (oncoming nurse) by Contreras Aguilar RN (offgoing nurse). Report included the following information SBAR, Kardex, Intake/Output, MAR and Recent Results.

## 2021-09-29 ENCOUNTER — HOSPITAL ENCOUNTER (OUTPATIENT)
Dept: WOUND CARE | Age: 71
Discharge: HOME | End: 2021-09-29
Attending: HOSPITALIST
Payer: MEDICARE

## 2021-09-29 VITALS
HEART RATE: 90 BPM | TEMPERATURE: 99 F | OXYGEN SATURATION: 100 % | DIASTOLIC BLOOD PRESSURE: 75 MMHG | RESPIRATION RATE: 16 BRPM | SYSTOLIC BLOOD PRESSURE: 134 MMHG

## 2021-09-29 PROCEDURE — 99211 OFF/OP EST MAY X REQ PHY/QHP: CPT

## 2021-09-29 RX ORDER — SULFAMETHOXAZOLE AND TRIMETHOPRIM 800; 160 MG/1; MG/1
1 TABLET ORAL DAILY
Qty: 90 TABLET | Refills: 1 | Status: SHIPPED | OUTPATIENT
Start: 2021-09-29 | End: 2021-12-13

## 2021-09-29 NOTE — WOUND CARE
[facility]                         (A Division of [address] [place] [address])                                                                                                                       Leigh Ann Pope MD  Office #:     [phone]   Office Fax: [phone]     Date of Clinic : 9/29/2021         Oupatient hospital discharge FU for : L hallux infection with HW in it  PCP: Jung Ramirez MD   Podiatry: Dr Gurmeet Stevens  Rheumatology: Dr Melanie Hyde    Current Antimicrobials:    Prior Antimicrobials:    Bactrim DS daily for suppressive rx  Immunosuppressive drugs: Enbrel/methotrexate   1. Vanco IV (1/27-3/12/20)  2. Ertapenem IV (2/3-3/12/20)       Assessment of ID issues:  --------------------------------------------------------------------------  L Great Toe hardware infection  1/27/20:  CRP - 41mg/L  3/9/20:    CRP - 5mg/L  8/5/20:    CRP - 7mg/L    COVID 19 Vaccine: completed, received Booster Sept    Other Medical Problem in List Followed by other MD's:  RA  PAD  HTN  Gout     Recommendation/ Plan on ID issues I am following:  ------------------------------------------------------------------------------    Refill Bactrim DS daily X6 months  Bmp prior to next visit- if not done by other MD's    FU in 6 months, if ID is refilling chronic suppression w/Bactrim     Summary:    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  No f/s/c. Last seen 1/27/21       Subjective:   Doing good.  Run out of med  No issue with foot  Received his booster for COVID 19  Medications reviewed  Podiatry has released him =no longer following with service       Patient Active Problem List   Diagnosis Code    Enlarged prostate with lower urinary tract symptoms (LUTS) N40.1    Calculus of both kidneys N20.0    Rheumatoid arthritis ([facility] M06.9    Essential hypertension I10    GERD (gastroesophageal reflux disease) K21.9    Disorder of bone and cartilage, unspecified M89.9, M94.9    Depression F32.9    Aortic stenosis I35.0    Gout M10.9    Impaired fasting blood sugar R73.01    Male erectile dysfunction, unspecified N52.9    Anxiety state F41.1    Chronic allergic rhinitis J30.9    History of aortic valve replacement Z95.2    Deviated nasal septum J34.2    Chronic maxillary sinusitis J32.0    Multiple-type hyperlipidemia E78.2    SHAILESH (obstructive sleep apnea) G47.33    Sensorineural hearing loss (SNHL) of both ears H90.3    Cellulitis L03.90    Cellulitis of foot, left L03. 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 7XXA    HTN (hypertension) I10    Asthma J45.909    S/P laparoscopic fundoplication S58.212    Hiatal hernia with gastroesophageal reflux K21.9, K44.9       Current Outpatient Medications   Medication Sig Dispense    celecoxib (CeleBREX) 100 mg capsule Take 200 mg by mouth two (2) times a day. Patient has not started yet           aspirin (ASPIRIN) 325 mg tablet Take 325 mg by mouth daily.  calcium carbonate-vitamin D3 600 mg(1,500mg) -800 unit tab Take 750 Tabs by mouth daily.  etanercept (EnbreL) 50 mg/mL (1 mL) injection 1 mL by SubCUTAneous route Every Thursday.  losartan (COZAAR) 25 mg tablet TAKE 1 TABLET BY MOUTH EVERY DAY     methotrexate (RHEUMATREX) 2.5 mg tablet Take 15 mg by mouth Every Thursday.  methotrexate (RHEUMATREX) 2.5 mg tablet TAKE 6 TABLET BY ORAL ROUTE EVERY 7 DAYS     tamsulosin (FLOMAX) 0.4 mg capsule Take 2 Caps by mouth daily.  180 Cap    calcium carbonate (CALTREX) 600 mg calcium (1,500 mg) tablet Take 1 Tab by mouth daily.  FOLIC ACID PO Take 490 mcg by mouth daily.  atorvastatin (LIPITOR) 80 mg tablet Take 80 mg by mouth nightly. Take once in the Evening     levocetirizine (XYZAL) 5 mg tablet Take 5 mg by mouth daily. Take once in the Evening     montelukast (SINGULAIR) 10 mg tablet Take 10 mg by mouth daily. Take once in the morning     amLODIPine (NORVASC) 10 mg tablet Take 10 mg by mouth daily.  sertraline (ZOLOFT) 50 mg tablet Take 100 mg by mouth daily.  allopurinol (ZYLOPRIM) 100 mg tablet Take 300 mg by mouth daily.  metoprolol tartrate (LOPRESSOR) 25 mg tablet Take 25 mg by mouth daily. Once a day in the morning     multivitamin (ONE A DAY) tablet Take 1 Tab by Mouth Once a Day.  omega 3-dha-epa-fish oil (FISH OIL) 60- mg cap Take 500 mg by mouth daily.  cholecalciferol, vitamin d3, (VITAMIN D3) 400 unit cap Take 2,000 Units by mouth daily.  trimethoprim-sulfamethoxazole (BACTRIM DS, SEPTRA DS) 160-800 mg per tablet Take 1 Tab by mouth daily for 90 days. 1 Tab         Objective:    Visit Vitals  /75 (BP 1 Location: Right upper arm, BP Patient Position: At rest;Sitting)   Pulse 90   Temp 99 °F (37.2 °C)   Resp 16   SpO2 100%        GEN: WDWN, not in resp distress. Ambulates ok  HEENT: Unicteric. EOMI intact  CHEST: Non laboured breathing  RAZ: Deferred  EXT: No apparent swelling or redness on UE/LE joints. Left foot exam: shortened L hallux. Well healed surgical scar. No sign of inflammation or open sore  Skin: Dry and intact. No rash, no redness. CNS: A, OX3. Moves all extremity. CN grossly ok.         Microbiology  All Micro Results     None

## 2021-12-09 PROBLEM — N20.1 CALCULUS OF DISTAL RIGHT URETER: Status: ACTIVE | Noted: 2021-12-09

## 2022-01-05 ENCOUNTER — TRANSCRIBE ORDER (OUTPATIENT)
Dept: REGISTRATION | Age: 72
End: 2022-01-05

## 2022-01-05 ENCOUNTER — HOSPITAL ENCOUNTER (OUTPATIENT)
Dept: PREADMISSION TESTING | Age: 72
Discharge: HOME | End: 2022-01-05
Payer: MEDICARE

## 2022-01-05 DIAGNOSIS — M25.532 LEFT WRIST PAIN: ICD-10-CM

## 2022-01-05 DIAGNOSIS — M25.532 LEFT WRIST PAIN: Primary | ICD-10-CM

## 2022-01-05 LAB
ATRIAL RATE: 70 BPM
CALCULATED P AXIS, ECG09: 64 DEGREES
CALCULATED R AXIS, ECG10: -5 DEGREES
CALCULATED T AXIS, ECG11: 31 DEGREES
DIAGNOSIS, 93000: NORMAL
HCT VFR BLD AUTO: 30.9 % (ref 36–48)
HGB BLD-MCNC: 10 G/DL (ref 13–16)
P-R INTERVAL, ECG05: 192 MS
POTASSIUM SERPL-SCNC: 4.2 MMOL/L (ref 3.5–5.5)
Q-T INTERVAL, ECG07: 398 MS
QRS DURATION, ECG06: 96 MS
QTC CALCULATION (BEZET), ECG08: 429 MS
VENTRICULAR RATE, ECG03: 70 BPM

## 2022-01-05 PROCEDURE — 84132 ASSAY OF SERUM POTASSIUM: CPT

## 2022-01-05 PROCEDURE — 85018 HEMOGLOBIN: CPT

## 2022-01-05 PROCEDURE — 36415 COLL VENOUS BLD VENIPUNCTURE: CPT

## 2022-01-05 PROCEDURE — 93005 ELECTROCARDIOGRAM TRACING: CPT

## 2022-02-01 ENCOUNTER — HOSPITAL ENCOUNTER (OUTPATIENT)
Dept: LAB | Age: 72
Discharge: HOME | End: 2022-02-01
Payer: MEDICARE

## 2022-02-01 ENCOUNTER — TRANSCRIBE ORDER (OUTPATIENT)
Dept: REGISTRATION | Age: 72
End: 2022-02-01

## 2022-02-01 ENCOUNTER — HOSPITAL ENCOUNTER (OUTPATIENT)
Dept: NON INVASIVE DIAGNOSTICS | Age: 72
Discharge: HOME | End: 2022-02-01
Payer: MEDICARE

## 2022-02-01 DIAGNOSIS — M25.532 LEFT WRIST PAIN: ICD-10-CM

## 2022-02-01 DIAGNOSIS — M25.532 LEFT WRIST PAIN: Primary | ICD-10-CM

## 2022-02-01 LAB
ATRIAL RATE: 96 BPM
CALCULATED P AXIS, ECG09: 72 DEGREES
CALCULATED R AXIS, ECG10: 0 DEGREES
CALCULATED T AXIS, ECG11: 65 DEGREES
DIAGNOSIS, 93000: NORMAL
HCT VFR BLD AUTO: 31.8 % (ref 36–48)
HGB BLD-MCNC: 10.2 G/DL (ref 13–16)
P-R INTERVAL, ECG05: 206 MS
POTASSIUM SERPL-SCNC: 4.3 MMOL/L (ref 3.5–5.5)
Q-T INTERVAL, ECG07: 330 MS
QRS DURATION, ECG06: 88 MS
QTC CALCULATION (BEZET), ECG08: 416 MS
VENTRICULAR RATE, ECG03: 96 BPM

## 2022-02-01 PROCEDURE — 36415 COLL VENOUS BLD VENIPUNCTURE: CPT

## 2022-02-01 PROCEDURE — 93005 ELECTROCARDIOGRAM TRACING: CPT

## 2022-02-01 PROCEDURE — 84132 ASSAY OF SERUM POTASSIUM: CPT

## 2022-02-01 PROCEDURE — 85018 HEMOGLOBIN: CPT

## 2022-02-08 ENCOUNTER — HOSPITAL ENCOUNTER (OUTPATIENT)
Dept: LAB | Age: 72
Discharge: HOME | End: 2022-02-08
Payer: MEDICARE

## 2022-02-08 PROCEDURE — 88305 TISSUE EXAM BY PATHOLOGIST: CPT

## 2022-03-18 PROBLEM — J30.9 CHRONIC ALLERGIC RHINITIS: Status: ACTIVE | Noted: 2018-08-16

## 2022-03-18 PROBLEM — G47.33 OSA (OBSTRUCTIVE SLEEP APNEA): Status: ACTIVE | Noted: 2018-08-16

## 2022-03-18 PROBLEM — Z98.890 S/P LAPAROSCOPIC FUNDOPLICATION: Status: ACTIVE | Noted: 2021-06-11

## 2022-03-19 PROBLEM — J34.2 DEVIATED NASAL SEPTUM: Status: ACTIVE | Noted: 2018-09-27

## 2022-03-19 PROBLEM — K44.9 HIATAL HERNIA WITH GASTROESOPHAGEAL REFLUX: Status: ACTIVE | Noted: 2021-06-11

## 2022-03-19 PROBLEM — L03.116 CELLULITIS OF FOOT, LEFT: Status: ACTIVE | Noted: 2019-07-08

## 2022-03-19 PROBLEM — L02.419 CELLULITIS AND ABSCESS OF LOWER EXTREMITY: Status: ACTIVE | Noted: 2020-01-27

## 2022-03-19 PROBLEM — L03.119 CELLULITIS AND ABSCESS OF LOWER EXTREMITY: Status: ACTIVE | Noted: 2020-01-27

## 2022-03-19 PROBLEM — N52.9 MALE ERECTILE DYSFUNCTION, UNSPECIFIED: Status: ACTIVE | Noted: 2017-12-18

## 2022-03-19 PROBLEM — J45.909 ASTHMA: Status: ACTIVE | Noted: 2021-06-11

## 2022-03-19 PROBLEM — L03.90 CELLULITIS: Status: ACTIVE | Noted: 2019-07-08

## 2022-03-19 PROBLEM — M86.172 OSTEOMYELITIS OF ANKLE OR FOOT, ACUTE, LEFT (HCC): Status: ACTIVE | Noted: 2020-01-27

## 2022-03-19 PROBLEM — K21.9 HIATAL HERNIA WITH GASTROESOPHAGEAL REFLUX: Status: ACTIVE | Noted: 2021-06-11

## 2022-03-19 PROBLEM — I01.1 RHEUMATOID AORTITIS: Status: ACTIVE | Noted: 2020-01-27

## 2022-03-19 PROBLEM — J32.0 CHRONIC MAXILLARY SINUSITIS: Status: ACTIVE | Noted: 2018-10-24

## 2022-03-19 PROBLEM — T84.7XXA: Status: ACTIVE | Noted: 2020-01-29

## 2022-03-19 PROBLEM — N20.1 CALCULUS OF DISTAL RIGHT URETER: Status: ACTIVE | Noted: 2021-12-09

## 2022-03-19 PROBLEM — I10 HTN (HYPERTENSION): Status: ACTIVE | Noted: 2021-06-11

## 2022-03-19 PROBLEM — H90.3 SENSORINEURAL HEARING LOSS (SNHL) OF BOTH EARS: Status: ACTIVE | Noted: 2018-10-31

## 2022-04-12 NOTE — PROGRESS NOTES
7/9/2019 PT note: consult received and chart reviewed. Evaluation attempted. Pt using bathroom. Will f/u at later time as pt schedule allows for PT evaluation. Thank you. Adelina Correa, PT    Addendum: called Dr. Jacinto Neal office for wt bearing status and left message with unit number for call back.   Adelina Correa, PT - - -

## 2023-06-08 NOTE — PROGRESS NOTES
Hospitalist Progress Note    Patient: Delmy Thomas MRN: [id number]  CSN: [id number]    YOB: 1950  Age: 77 y.o. Sex: male    DOA: 1/27/2020 LOS:  LOS: 6 days            Assessment/Plan   1. Left foot cellultisis w wound dehischence & exposed hardware sp I&D  2. HTN controlled  3. RA - embrel being held  4. BPH  5. PAD spaortogram w perc angioplasty left posterior tibial artery    Plan:  - continue antibiotics ( zosyn/ vancomycin)  - continue antihypertensives  - wound care per podiatry      Patient Active Problem List   Diagnosis Code    Enlarged prostate with lower urinary tract symptoms (LUTS) N40.1    Calculus of both kidneys N20.0    Rheumatoid arthritis ([facility] M06.9    Essential hypertension I10    GERD (gastroesophageal reflux disease) K21.9    Disorder of bone and cartilage, unspecified M89.9, M94.9    Depression F32.9    Aortic stenosis I35.0    Gout M10.9    Impaired fasting blood sugar R73.01    Male erectile dysfunction, unspecified N52.9    Anxiety state F41.1    Chronic allergic rhinitis J30.9    History of aortic valve replacement Z95.2    Deviated nasal septum J34.2    Chronic maxillary sinusitis J32.0    Multiple-type hyperlipidemia E78.2    SHAILESH (obstructive sleep apnea) G47.33    Sensorineural hearing loss (SNHL) of both ears H90.3    Cellulitis L03.90    Cellulitis of foot, left L03. 116    Rheumatoid aortitis I01.1    Osteomyelitis of ankle or foot, acute, left (HCC) M86.172    Cellulitis and abscess of lower extremity L03.119, L02.419    Infection and inflammatory reaction due to other internal orthopedic prosthetic devices, implants and grafts, initial encounter ([facility] T84. 7XXA               Subjective:    cc: foot pain  Pt complains of pain at the operative site, 5/10, better w analgesia  No fever, chlls, diarrhea, thrush noted      REVIEW OF SYSTEMS:  General: No fevers or chills. Cardiovascular: No chest pain or pressure. No palpitations. Pulmonary: No shortness of breath. Gastrointestinal: No nausea, vomiting. Objective:        Vital signs/Intake and Output:  Visit Vitals  /72   Pulse 71   Temp 98 °F (36.7 °C)   Resp 16   Ht 5' 9\" (1.753 m)   Wt 82.8 kg (182 lb 8 oz)   SpO2 94%   BMI 26.95 kg/m²     Current Shift:  No intake/output data recorded. Last three shifts:  01/31 1901 - 02/02 0700  In: 6821.3 [P.O.:240; I.V.:6581.3]  Out: 3775 [Urine:3775]    Body mass index is 26.95 kg/m².     Physical Exam:  GEN: Alert and oriented times three NAD  EYES: conjunctiva normal, lids with out lesions  HEENT: MMM, No thyromegaly, no lymphadenopathy  HEART: RRR +S1 +S2, no JVD, pulses 2+ distally  LUNGS: CTA B/L no wheezes, rales or rhonchi  ABDOMEN: + BS, soft NT/ND no organomegaly,  No rebound  EXTREMITIES: No edema cyanosis, cap refill normal, foot wrapped cdi   SKIN: no rashes or skin breakdown, no nodules, normal turgor  Current Facility-Administered Medications   Medication Dose Route Frequency    lactated Ringers infusion  125 mL/hr IntraVENous CONTINUOUS    clopidogreL (PLAVIX) tablet 75 mg  75 mg Oral DAILY    HYDROmorphone (PF) (DILAUDID) injection 1 mg  1 mg IntraVENous Q4H PRN    collagenase (SANTYL) 250 unit/gram ointment   Topical DAILY    allopurinoL (ZYLOPRIM) tablet 100 mg  100 mg Oral DAILY    amLODIPine (NORVASC) tablet 10 mg  10 mg Oral DAILY    aspirin delayed-release tablet 81 mg  81 mg Oral DAILY    atorvastatin (LIPITOR) tablet 80 mg  80 mg Oral QHS    calcium carbonate (OS-CORBY) tablet 500 mg [elemental]  500 mg Oral BID WITH MEALS    cholecalciferol (VITAMIN D3) (400 Units /10 mcg) tablet 1 Tab  400 Units Oral DAILY    loratadine (CLARITIN) tablet 10 mg  10 mg Oral DAILY    lisinopril (PRINIVIL, ZESTRIL) tablet 5 mg  5 mg Oral DAILY    metoprolol tartrate (LOPRESSOR) tablet 25 mg  25 mg Oral DAILY    montelukast (SINGULAIR) tablet 10 mg  10 mg Oral DAILY    fish oil-omega-3 fatty acids 340-1,000 mg capsule 1 Cap  1 Cap Oral DAILY    pantoprazole (PROTONIX) tablet 40 mg  40 mg Oral ACB    sertraline (ZOLOFT) tablet 100 mg  100 mg Oral DAILY    tamsulosin (FLOMAX) capsule 0.4 mg  0.4 mg Oral DAILY    heparin (porcine) 100 unit/mL injection 500 Units  500 Units InterCATHeter Q8H PRN    sodium chloride (NS) flush 5-40 mL  5-40 mL IntraVENous Q8H    sodium chloride (NS) flush 5-40 mL  5-40 mL IntraVENous PRN    piperacillin-tazobactam (ZOSYN) 3.375 g in 0.9% sodium chloride (MBP/ADV) 100 mL MBP  3.375 g IntraVENous Q6H    oxyCODONE-acetaminophen (PERCOCET 7.5) 7.5-325 mg per tablet 1 Tab  1 Tab Oral Q4H PRN    enoxaparin (LOVENOX) injection 40 mg  40 mg SubCUTAneous Q24H    Vancomycin-pharmacy to consult  1 Each Other Rx Dosing/Monitoring    vancomycin (VANCOCIN) 1,250 mg in 0.9% sodium chloride 250 mL (VIAL-MATE)  1,250 mg IntraVENous Q12H         All the patient's labs over the past 24 hours were reviewed both during my initial daily workflow process and at the time notated as \"note time\" in [facility]. (It is not time stamped separately in this workflow.)  Select labs are listed below.         Labs: Results:       Chemistry Recent Labs     02/02/20  0330 02/01/20 0750 01/31/20  0230   GLU 75 86 139*    140 139   K 3.7 3.7 3.7    110 107   CO2 28 27 25   BUN 9 9 12   CREA 0.78 0.90 0.80   CA 8.4* 8.2* 8.0*   AGAP 5 3 7   BUCR 12 10* 15      CBC w/Diff Recent Labs     02/02/20  0330 02/01/20  0750 01/31/20  0736   WBC 6.6 7.1 7.9   RBC 3.29* 3.39* 3.55*   HGB 9.6* 10.0* 10.4*   HCT 29.9* 30.8* 32.1*   * 686* 704*                              Procedures/imaging: see electronic medical records for all procedures/Xrays and details which were not copied into this note but were reviewed prior to creation of [address], DO  Internal Medicine/Geriatrics details Anicteric conjunctivae/External ear normal/Normal dentition/No oral lesions/Normal oropharynx

## 2023-07-26 NOTE — CONSULTS
[address] [facility]    Name:  Kaity Valiente  MR#:   [id number]  :  1950  ACCOUNT #:  [de-identified]  DATE OF SERVICE:  2020    CHIEF COMPLAINT:  Left great toe wound dehiscence. HISTORY OF PRESENT ILLNESS:  The patient is a 50-year-old male with a history of rheumatoid arthritis with a wound dehiscence that occurred a week to a week and a half after a great toe metatarsophalangeal joint fusion; this was done with dorsal exposure with plate and screws. At around a week and a half postop, at the dressing change, the wound had dehisced leaving exposed hardware with a concern for infection and poor healing at the surrounding soft tissues. The patient has had issues with wound healing in this area before and had a longstanding infection that he believe is at least partially related to the Enbrel, which he takes for rheumatoid arthritis. He is currently in the hospital with a wound VAC and getting IV antibiotics with a PICC line. MEDICATIONS:  Allopurinol, amlodipine, aspirin, atorvastatin, loratadine, lisinopril, metoprolol, sertraline, and Flomax. ALLERGIES:  NO KNOWN DRUG ALLERGIES. PAST MEDICAL HISTORY:  BPH, kidney stones, rheumatoid arthritis, hypertension, GERD, depression, aortic stenosis requiring a bovine valve, gout, obstructive sleep apnea, foot cellulitis and osteomyelitis. PAST SURGICAL HISTORY:  Aortic valve replacement, hernia repair, left knee replacement, and lithotripsy. SOCIAL HISTORY:  Negative for tobacco use. PHYSICAL EXAMINATION:  GENERAL:  He appears well and in no acute distress. Alert and oriented, responds appropriately. VITAL SIGNS:  He is afebrile. Stable vital signs. EXTREMITIES:  On focused exam of the left foot, wound VAC was removed. He has a dorsal wound dehiscence with exposed plate. There is good capillary refill in toes with reasonably good sensation. I do not see any obvious purulence.   I had a difficult time palpating the DP and PT pulse. He did have a good popliteal pulse. Examination of both upper extremities reveals palpable radial pulses; however, he does have some rheumatoid wrist and finger deformities on both sides. LABORATORY DATA:  Blood work, his white count has ranged from 10-13 on admission. His hemoglobin is stable at around 10, platelets are elevated at around 600. Blood sugar has been in the normal range. ASSESSMENT AND PLAN:  This is a 71-year-old male with a difficult problem with exposed hardware, wound dehiscence and likely infection after great toe metatarsophalangeal joint fusion. I explained the challenge of the situation to the patient and he seems well informed along with his wife. Given my difficulty in getting a readily palpable pulse in the foot, I have suggested to start with noninvasive vascular studies to assess whether that could be optimized. I explained the general course of his two options, which are essentially toe amputation and toe salvage which will include removal of the hardware, placement of a spacer and temporary fixation and he would likely need free flap coverage potentially with a radial forearm flap. Then, after a period of months, he would need an additional surgery, where the spacer would be removed and definitive fixation of the MTP joint could be attempted with stable soft tissue coverage; however, that option is contingent upon optimized blood flow to the foot and his willingness to go through the involved reconstructive process. They seem to want to start with some vascular studies, which I think is a great idea, and I will plan to follow along and see if anything further is needed.       Suman Moore MD [email or web address]  D:  01/29/2020 10:27  T:  01/29/2020 11:20  JOB #:  [id number] Awake/Alert

## 2024-03-19 ENCOUNTER — ANESTHESIA EVENT (OUTPATIENT)
Facility: HOSPITAL | Age: 74
End: 2024-03-19
Payer: MEDICARE

## 2024-03-19 RX ORDER — L. ACIDOPHILUS/L.BULGARICUS 100MM CELL
1 GRANULES IN PACKET (EA) ORAL 3 TIMES DAILY
COMMUNITY

## 2024-03-19 RX ORDER — CHOLESTYRAMINE 4 G/9G
1 POWDER, FOR SUSPENSION ORAL 2 TIMES DAILY
COMMUNITY

## 2024-03-19 RX ORDER — ASPIRIN 81 MG/1
81 TABLET ORAL DAILY
COMMUNITY

## 2024-03-20 ENCOUNTER — HOSPITAL ENCOUNTER (OUTPATIENT)
Facility: HOSPITAL | Age: 74
Setting detail: OUTPATIENT SURGERY
Discharge: HOME | End: 2024-03-20
Attending: UROLOGY | Admitting: UROLOGY
Payer: MEDICARE

## 2024-03-20 ENCOUNTER — ANESTHESIA (OUTPATIENT)
Facility: HOSPITAL | Age: 74
End: 2024-03-20
Payer: MEDICARE

## 2024-03-20 ENCOUNTER — APPOINTMENT (OUTPATIENT)
Facility: HOSPITAL | Age: 74
End: 2024-03-20
Attending: UROLOGY
Payer: MEDICARE

## 2024-03-20 VITALS
HEART RATE: 59 BPM | TEMPERATURE: 97.4 F | HEIGHT: 67 IN | RESPIRATION RATE: 16 BRPM | WEIGHT: 120.2 LBS | SYSTOLIC BLOOD PRESSURE: 126 MMHG | DIASTOLIC BLOOD PRESSURE: 75 MMHG | BODY MASS INDEX: 18.87 KG/M2 | OXYGEN SATURATION: 100 %

## 2024-03-20 DIAGNOSIS — N20.0 KIDNEY STONE: Primary | ICD-10-CM

## 2024-03-20 DIAGNOSIS — E78.2 MULTIPLE-TYPE HYPERLIPIDEMIA: ICD-10-CM

## 2024-03-20 PROCEDURE — 3700000000 HC ANESTHESIA ATTENDED CARE: Performed by: UROLOGY

## 2024-03-20 PROCEDURE — 2709999900 HC NON-CHARGEABLE SUPPLY: Performed by: UROLOGY

## 2024-03-20 PROCEDURE — 2580000003 HC RX 258: Performed by: UROLOGY

## 2024-03-20 PROCEDURE — 6360000004 HC RX CONTRAST MEDICATION: Performed by: UROLOGY

## 2024-03-20 PROCEDURE — 6360000002 HC RX W HCPCS: Performed by: NURSE ANESTHETIST, CERTIFIED REGISTERED

## 2024-03-20 PROCEDURE — 6360000002 HC RX W HCPCS: Performed by: UROLOGY

## 2024-03-20 PROCEDURE — 7100000011 HC PHASE II RECOVERY - ADDTL 15 MIN: Performed by: UROLOGY

## 2024-03-20 PROCEDURE — C1758 CATHETER, URETERAL: HCPCS | Performed by: UROLOGY

## 2024-03-20 PROCEDURE — C1769 GUIDE WIRE: HCPCS | Performed by: UROLOGY

## 2024-03-20 PROCEDURE — 2500000003 HC RX 250 WO HCPCS: Performed by: NURSE ANESTHETIST, CERTIFIED REGISTERED

## 2024-03-20 PROCEDURE — 3600000002 HC SURGERY LEVEL 2 BASE: Performed by: UROLOGY

## 2024-03-20 PROCEDURE — 7100000001 HC PACU RECOVERY - ADDTL 15 MIN: Performed by: UROLOGY

## 2024-03-20 PROCEDURE — 7100000000 HC PACU RECOVERY - FIRST 15 MIN: Performed by: UROLOGY

## 2024-03-20 PROCEDURE — 3700000001 HC ADD 15 MINUTES (ANESTHESIA): Performed by: UROLOGY

## 2024-03-20 PROCEDURE — 7100000010 HC PHASE II RECOVERY - FIRST 15 MIN: Performed by: UROLOGY

## 2024-03-20 PROCEDURE — 3600000012 HC SURGERY LEVEL 2 ADDTL 15MIN: Performed by: UROLOGY

## 2024-03-20 PROCEDURE — 2720000010 HC SURG SUPPLY STERILE: Performed by: UROLOGY

## 2024-03-20 RX ORDER — SODIUM CHLORIDE 9 MG/ML
INJECTION, SOLUTION INTRAVENOUS PRN
Status: DISCONTINUED | OUTPATIENT
Start: 2024-03-20 | End: 2024-03-20 | Stop reason: HOSPADM

## 2024-03-20 RX ORDER — PHENYLEPHRINE HCL IN 0.9% NACL 1 MG/10 ML
SYRINGE (ML) INTRAVENOUS PRN
Status: DISCONTINUED | OUTPATIENT
Start: 2024-03-20 | End: 2024-03-20 | Stop reason: SDUPTHER

## 2024-03-20 RX ORDER — VANCOMYCIN HYDROCHLORIDE 750 MG/15ML
750 INJECTION, POWDER, LYOPHILIZED, FOR SOLUTION INTRAVENOUS EVERY 24 HOURS
COMMUNITY
Start: 2024-02-29 | End: 2024-03-24

## 2024-03-20 RX ORDER — PROPOFOL 10 MG/ML
INJECTION, EMULSION INTRAVENOUS PRN
Status: DISCONTINUED | OUTPATIENT
Start: 2024-03-20 | End: 2024-03-20 | Stop reason: SDUPTHER

## 2024-03-20 RX ORDER — SODIUM CHLORIDE, SODIUM LACTATE, POTASSIUM CHLORIDE, CALCIUM CHLORIDE 600; 310; 30; 20 MG/100ML; MG/100ML; MG/100ML; MG/100ML
INJECTION, SOLUTION INTRAVENOUS CONTINUOUS
Status: DISCONTINUED | OUTPATIENT
Start: 2024-03-20 | End: 2024-03-20 | Stop reason: HOSPADM

## 2024-03-20 RX ORDER — IOPAMIDOL 612 MG/ML
INJECTION, SOLUTION INTRATHECAL PRN
Status: DISCONTINUED | OUTPATIENT
Start: 2024-03-20 | End: 2024-03-20 | Stop reason: ALTCHOICE

## 2024-03-20 RX ORDER — HYDROMORPHONE HYDROCHLORIDE 1 MG/ML
0.5 INJECTION, SOLUTION INTRAMUSCULAR; INTRAVENOUS; SUBCUTANEOUS EVERY 5 MIN PRN
Status: DISCONTINUED | OUTPATIENT
Start: 2024-03-20 | End: 2024-03-20 | Stop reason: HOSPADM

## 2024-03-20 RX ORDER — FENTANYL CITRATE 50 UG/ML
INJECTION, SOLUTION INTRAMUSCULAR; INTRAVENOUS PRN
Status: DISCONTINUED | OUTPATIENT
Start: 2024-03-20 | End: 2024-03-20 | Stop reason: SDUPTHER

## 2024-03-20 RX ORDER — PROCHLORPERAZINE EDISYLATE 5 MG/ML
5 INJECTION INTRAMUSCULAR; INTRAVENOUS
Status: DISCONTINUED | OUTPATIENT
Start: 2024-03-20 | End: 2024-03-20 | Stop reason: HOSPADM

## 2024-03-20 RX ORDER — EPHEDRINE SULFATE/0.9% NACL/PF 50 MG/5 ML
SYRINGE (ML) INTRAVENOUS PRN
Status: DISCONTINUED | OUTPATIENT
Start: 2024-03-20 | End: 2024-03-20 | Stop reason: SDUPTHER

## 2024-03-20 RX ORDER — FENTANYL CITRATE 50 UG/ML
25 INJECTION, SOLUTION INTRAMUSCULAR; INTRAVENOUS EVERY 5 MIN PRN
Status: DISCONTINUED | OUTPATIENT
Start: 2024-03-20 | End: 2024-03-20 | Stop reason: HOSPADM

## 2024-03-20 RX ORDER — IPRATROPIUM BROMIDE AND ALBUTEROL SULFATE 2.5; .5 MG/3ML; MG/3ML
1 SOLUTION RESPIRATORY (INHALATION)
Status: DISCONTINUED | OUTPATIENT
Start: 2024-03-20 | End: 2024-03-20 | Stop reason: HOSPADM

## 2024-03-20 RX ORDER — ROCURONIUM BROMIDE 10 MG/ML
INJECTION, SOLUTION INTRAVENOUS PRN
Status: DISCONTINUED | OUTPATIENT
Start: 2024-03-20 | End: 2024-03-20 | Stop reason: SDUPTHER

## 2024-03-20 RX ORDER — SODIUM CHLORIDE 0.9 % (FLUSH) 0.9 %
5-40 SYRINGE (ML) INJECTION EVERY 12 HOURS SCHEDULED
Status: DISCONTINUED | OUTPATIENT
Start: 2024-03-20 | End: 2024-03-20 | Stop reason: HOSPADM

## 2024-03-20 RX ORDER — NALOXONE HYDROCHLORIDE 0.4 MG/ML
INJECTION, SOLUTION INTRAMUSCULAR; INTRAVENOUS; SUBCUTANEOUS PRN
Status: DISCONTINUED | OUTPATIENT
Start: 2024-03-20 | End: 2024-03-20 | Stop reason: HOSPADM

## 2024-03-20 RX ORDER — CEFUROXIME AXETIL 500 MG/1
500 TABLET ORAL 2 TIMES DAILY
Qty: 6 TABLET | Refills: 0 | Status: SHIPPED | OUTPATIENT
Start: 2024-03-20 | End: 2024-03-23

## 2024-03-20 RX ORDER — LIDOCAINE HYDROCHLORIDE 20 MG/ML
INJECTION, SOLUTION EPIDURAL; INFILTRATION; INTRACAUDAL; PERINEURAL PRN
Status: DISCONTINUED | OUTPATIENT
Start: 2024-03-20 | End: 2024-03-20 | Stop reason: SDUPTHER

## 2024-03-20 RX ORDER — MIDAZOLAM HYDROCHLORIDE 1 MG/ML
INJECTION INTRAMUSCULAR; INTRAVENOUS PRN
Status: DISCONTINUED | OUTPATIENT
Start: 2024-03-20 | End: 2024-03-20 | Stop reason: SDUPTHER

## 2024-03-20 RX ORDER — PHENAZOPYRIDINE HYDROCHLORIDE 100 MG/1
100 TABLET, FILM COATED ORAL
Qty: 9 TABLET | Refills: 0 | Status: SHIPPED | OUTPATIENT
Start: 2024-03-20 | End: 2024-03-23

## 2024-03-20 RX ORDER — DOCUSATE SODIUM 100 MG/1
100 CAPSULE, LIQUID FILLED ORAL 2 TIMES DAILY
Qty: 20 CAPSULE | Refills: 0 | Status: SHIPPED | OUTPATIENT
Start: 2024-03-20 | End: 2024-03-30

## 2024-03-20 RX ORDER — SODIUM CHLORIDE 0.9 % (FLUSH) 0.9 %
5-40 SYRINGE (ML) INJECTION PRN
Status: DISCONTINUED | OUTPATIENT
Start: 2024-03-20 | End: 2024-03-20 | Stop reason: HOSPADM

## 2024-03-20 RX ORDER — HYDROCODONE BITARTRATE AND ACETAMINOPHEN 5; 325 MG/1; MG/1
1 TABLET ORAL EVERY 6 HOURS PRN
Qty: 12 TABLET | Refills: 0 | Status: SHIPPED | OUTPATIENT
Start: 2024-03-20 | End: 2024-03-23

## 2024-03-20 RX ORDER — ONDANSETRON 2 MG/ML
INJECTION INTRAMUSCULAR; INTRAVENOUS PRN
Status: DISCONTINUED | OUTPATIENT
Start: 2024-03-20 | End: 2024-03-20 | Stop reason: SDUPTHER

## 2024-03-20 RX ADMIN — FENTANYL CITRATE 25 MCG: 50 INJECTION, SOLUTION INTRAMUSCULAR; INTRAVENOUS at 12:56

## 2024-03-20 RX ADMIN — Medication 100 MCG: at 12:51

## 2024-03-20 RX ADMIN — ROCURONIUM BROMIDE 30 MG: 10 INJECTION, SOLUTION INTRAVENOUS at 12:34

## 2024-03-20 RX ADMIN — PROPOFOL 130 MG: 10 INJECTION, EMULSION INTRAVENOUS at 12:34

## 2024-03-20 RX ADMIN — SODIUM CHLORIDE, POTASSIUM CHLORIDE, SODIUM LACTATE AND CALCIUM CHLORIDE: 600; 310; 30; 20 INJECTION, SOLUTION INTRAVENOUS at 11:15

## 2024-03-20 RX ADMIN — ONDANSETRON HYDROCHLORIDE 4 MG: 2 INJECTION INTRAMUSCULAR; INTRAVENOUS at 12:41

## 2024-03-20 RX ADMIN — LIDOCAINE HYDROCHLORIDE 80 MG: 20 INJECTION, SOLUTION EPIDURAL; INFILTRATION; INTRACAUDAL; PERINEURAL at 12:34

## 2024-03-20 RX ADMIN — WATER 2000 MG: 1 INJECTION INTRAMUSCULAR; INTRAVENOUS; SUBCUTANEOUS at 12:37

## 2024-03-20 RX ADMIN — FENTANYL CITRATE 50 MCG: 50 INJECTION, SOLUTION INTRAMUSCULAR; INTRAVENOUS at 12:25

## 2024-03-20 RX ADMIN — Medication 100 MCG: at 12:56

## 2024-03-20 RX ADMIN — SUGAMMADEX 100 MG: 100 INJECTION, SOLUTION INTRAVENOUS at 13:20

## 2024-03-20 RX ADMIN — Medication 100 MCG: at 13:04

## 2024-03-20 RX ADMIN — Medication 10 MG: at 12:44

## 2024-03-20 RX ADMIN — Medication 10 MG: at 13:01

## 2024-03-20 RX ADMIN — Medication 10 MG: at 12:32

## 2024-03-20 RX ADMIN — MIDAZOLAM 1 MG: 1 INJECTION INTRAMUSCULAR; INTRAVENOUS at 12:25

## 2024-03-20 ASSESSMENT — LIFESTYLE VARIABLES: SMOKING_STATUS: 0

## 2024-03-20 ASSESSMENT — PAIN SCALES - GENERAL
PAINLEVEL_OUTOF10: 0
PAINLEVEL_OUTOF10: 0

## 2024-03-20 ASSESSMENT — PAIN DESCRIPTION - DESCRIPTORS: DESCRIPTORS: THROBBING

## 2024-03-20 ASSESSMENT — PAIN - FUNCTIONAL ASSESSMENT: PAIN_FUNCTIONAL_ASSESSMENT: 0-10

## 2024-03-20 NOTE — PERIOP NOTE
TRANSFER - IN REPORT:    Verbal report received from KAREN Kaur (name) on Dale Carpio .  being received from PACU (unit) for routine progression of patient care      Report consisted of patient’s Situation, Background, Assessment and   Recommendations(SBAR).     Information from the following report(s) Nurse Handoff Report, Surgery Report, Intake/Output, and MAR was reviewed with the receiving nurse.    Opportunity for questions and clarification was provided.      Assessment completed upon patient’s arrival to unit and care assume        
Anesthesia comments faxed to Destinyi's office- re: \"Recent hospitalization. Optimized?\" Althea aware and will send to PCP  
Ani informed, via phone, of pt last had ASA and Eliquis yesterday and the times of administration. No new orders were received.  
Discharge instructions given to patient and caregiver. Written instructions given to take home. Verbal understanding given by patient and caregiver. ID band removed before leaving.  
Instructed to bring CPAP on admission. No removable prosthetic devices or family history of malignant hyperthermia. Dial soap wash instructions reviewed. PCP is not aware of the surgery. No participation in clinical trial or research study. Do not bring any valuables on DOS- jewelry, wallet, cash, laptop, medications.  Does not meet criteria for special population at this time. Possible time delay day of surgery reviewed. Instructed to bring Insurance card and ID on the day of surgery.  DNR status-none. Per posting slip and pt, Dr. De Los Santos is aware and ok with pt still being on Eliquis.     PAT Activity Status Questionnaire       Yes No Points for YES    Are you able to climb a flight of stairs or walk up a hill? [x] [] 1   Are you able to do heavy work around the house like lifting or moving heavy furniture? [] [x] 1   Do yardwork like raking leaves, weeding or pushing a power mower? [] [x] 1   Participate in strenuous sports like swimming, singles tennis, football, basketball or skiing? [] [x] 1   Total Score:      1       If TOTAL SCORE is <3, send chart for anesthesia review.  
Reviewed PTA medication list with patient/caregiver and patient/caregiver denies any additional medications.     Patient admits to having a responsible adult care for them at home for at least 24 hours after surgery.    Patient encouraged to use gown warming system and informed that using said warming gown to regulate body temperature prior to a procedure has been shown to help reduce the risks of blood clots and infection.    Patient's pharmacy of choice verified and documented in PTA medication section.    Dual skin assessment & fall risk band verification completed with Sloane JONES.       
TRANSFER - IN REPORT:    Verbal report received from Nicholas RN and CHARI Maier CRNA on Dale Carpio Jr.  being received from OR for routine post-op      Report consisted of patient's Situation, Background, Assessment and   Recommendations(SBAR).     Information from the following report(s) Nurse Handoff Report, Surgery Report, Intake/Output, and MAR was reviewed with the receiving nurse.    Opportunity for questions and clarification was provided.      Assessment completed upon patient's arrival to unit and care assumed.    
TRANSFER - OUT REPORT:    Verbal report given to Kristy FUNES RN on Dale Carpio Jr.  being transferred to Phase II for routine progression of patient care       Report consisted of patient's Situation, Background, Assessment and   Recommendations(SBAR).     Information from the following report(s) Nurse Handoff Report, Surgery Report, Intake/Output, and MAR was reviewed with the receiving nurse.           Lines:   PICC Double Lumen Right Brachial (Active)   Central Line Being Utilized No 03/20/24 1350   Dressing Status Clean, dry & intact 03/20/24 1350       Peripheral IV 03/20/24 Left;Posterior Arm (Active)   Site Assessment Clean, dry & intact 03/20/24 1350   Line Status Infusing 03/20/24 1350   Line Care Connections checked and tightened 03/20/24 1330   Phlebitis Assessment No symptoms 03/20/24 1350   Infiltration Assessment 0 03/20/24 1350   Alcohol Cap Used No 03/20/24 1350   Dressing Status Clean, dry & intact 03/20/24 1350   Dressing Type Transparent 03/20/24 1350   Dressing Intervention New 03/20/24 1115        Opportunity for questions and clarification was provided.      Patient transported with:  Tech       
1.64

## 2024-03-20 NOTE — ANESTHESIA POSTPROCEDURE EVALUATION
Department of Anesthesiology  Postprocedure Note    Patient: Dale Carpio Jr.  MRN: [id number]  YOB: 1950  Date of evaluation: 3/20/2024    Procedure Summary       Date: 03/20/24 Room / Location: [facility] MAIN CYSTO / [facility] MAIN OR    Anesthesia Start: 1225 Anesthesia Stop: 1329    Procedure: CYSTOSCOPY, BILATERAL URETEROSCOPY, THULIUM LASER LITHOTRIPSY, BILATERAL STENT REMOVAL WITH C-ARM (Bladder) Diagnosis:       Kidney stone      (Kidney stone [N20.0])    Surgeons: Abdon De Los Santos MD Responsible Provider: Jimbo Junior MD    Anesthesia Type: General ASA Status: 3            Anesthesia Type: General    Alannah Phase I:      Alannah Phase II:      Anesthesia Post Evaluation    Patient location during evaluation: PACU  Patient participation: complete - patient participated  Level of consciousness: awake and alert  Pain score: 3  Airway patency: patent  Nausea & Vomiting: no nausea and no vomiting  Cardiovascular status: blood pressure returned to baseline  Respiratory status: acceptable  Hydration status: euvolemic        No notable events documented.

## 2024-03-20 NOTE — ANESTHESIA PRE PROCEDURE
Neuro/Psych:   (+) psychiatric history:   (-) neuromuscular disease           GI/Hepatic/Renal:   (+) hiatal hernia, GERD: poorly controlled         ROS comment: Hospitalized with aspiration recently.   Endo/Other:    (+) : arthritis: rheumatoid..                 Abdominal:             Vascular:          Other Findings:             Anesthesia Plan      general     ASA 3       Induction: intravenous.    MIPS: Postoperative opioids intended.  Anesthetic plan and risks discussed with patient.      Plan discussed with CRNA.                    Jimbo Junior MD   3/20/2024

## 2024-03-20 NOTE — ANESTHESIA POSTPROCEDURE EVALUATION
Post-Anesthesia Evaluation and Assessment    Cardiovascular Function/Vital Signs  Visit Vitals  BP (!) 147/72   Pulse 61   Temp 36.7 °C (98 °F) (Temporal)   Resp 16   Ht 1.702 m (5' 7\")   Wt 54.5 kg (120 lb 3.2 oz)   SpO2 100%   BMI 18.83 kg/m²       Patient is status post Procedure(s):  CYSTOSCOPY, BILATERAL URETEROSCOPY, THULIUM LASER LITHOTRIPSY, BILATERAL STENT REMOVAL WITH C-ARM.    Nausea/Vomiting: Controlled.    Postoperative hydration reviewed and adequate.    Pain:      Managed.    Neurological Status:       At baseline.    Mental Status and Level of Consciousness: Arousable.    Pulmonary Status:       Adequate oxygenation and airway patent.    Complications related to anesthesia: None    Post-anesthesia assessment completed. No concerns.    Patient has met all discharge requirements.    Signed By: JOAQUIN Valdez - BRUCE    March 20, 2024

## 2024-03-20 NOTE — DISCHARGE INSTRUCTIONS
have symptoms of dehydration, such as:  Dry eyes and a dry mouth.  Passing only a little urine.  Feeling thirstier than usual.     You are dizzy or lightheaded, or you feel like you may faint.           Infection After Surgery: Care Instructions  Overview  After surgery, an infection is always possible. It doesn't mean that the surgery didn't go well.  How can you care for yourself at home?  Make sure your surgeon knows about the infection, especially if you saw another doctor about your symptoms.  If your doctor prescribed antibiotics, take them as directed. Do not stop taking them just because you feel better. You need to take the full course of antibiotics.  Keep the skin clean and dry.  You may have a bandage over the cut (incision). A bandage helps the incision heal and protects it. Your doctor will tell you how to take care of this. Keep it clean and dry. You may have drainage from the wound.     Call your doctor now or seek immediate medical care if:    You have signs of an infection such as:  Increased pain, swelling, warmth, or redness in the area.  Red streaks leading from the area.  Pus draining from the wound.  A new or higher fever.       Bleeding After Surgery: Care Instructions  Overview  After surgery, it is common to have some minor bruising or bleeding from the cut (incision) made by your doctor. But problems may occur that cause you to bleed too much in the surgery area.  An injury to a blood vessel can cause bleeding after surgery. Other causes include medicines such as aspirin or anticoagulants (blood thinners).  To help stop the bleeding, your doctor may have put pressure on the incision or sewn up or cauterized (sealed) the incision. Or you may have had surgery to stop bleeding inside the surgery area. Your doctor also may have given you medicines that help stop the bleeding.  How can you care for yourself at home?  If you have strips of tape on the incision, leave the tape on until it falls

## 2024-03-20 NOTE — OP NOTE
Operative Note      Patient: Dale Carpio Jr.  YOB: 1950  MRN: [id number]    Date of Procedure: 3/20/2024    Pre-Op Diagnosis Codes:  Bilateral ureteral stones    Post-Op Diagnosis: Post-Op Diagnosis Codes:     * Kidney stone [N20.0]       Procedure(s):  CYSTOSCOPY, BILATERAL URETEROSCOPY THULIUM LASER LITHOTRIPSY OF RIGHT AND LEFT URETERAL STONES, BILATERAL STENT REMOVAL WITH C-ARM    Surgeon(s):  Abdon De Los Santos MD    Assistant:   * No surgical staff found *    Anesthesia: General    Estimated Blood Loss (mL): Minimal    Complications: None    Specimens:   * No specimens in log *    Implants:  * No implants in log *      Drains:   Gastrostomy/Enterostomy/Jejunostomy Tube Percutaneous Endoscopic Gastrostomy (PEG) (Active)   Site Description Clean, dry & intact 03/20/24 1329   Dressing Status Clean, dry & intact 03/20/24 1120       Findings: Right ureteral stone 8mm and 4mm stones and left ureteral stone 4mm. All treated to dust.        Detailed Description of Procedure:   On the day of operation, the patient was explained and understood all the risks, benefits, and alternatives of the procedure were explained to him and he was then brought to the operative theater and placed on the table in a supine position. General anesthetic was administered. Patient received preoperative antibiotics prior to the beginning of procedure. A time-out was performed. When the patient was sufficiently anesthetized, he was moved into the dorsal lithotomy position, and prepped and draped in a usual sterile fashion. We then inserted a 21-Urdu cystoscope with a 30 degree lens in an atraumatic fashion into the patient's transurethrally into the bladder. Inspection of the bladder showed no abnormality.       Sensor wire was placed into the Bilateral kidney x 2. The positions were confirmed fluoroscopically.  A disposable ureteroscope was passed to the level of the stones and was treated with 200 nm thulium fiber to

## 2024-03-20 NOTE — H&P
intravenous solution  Administer SIMPONI ARIA 2mg/kg IV week 0,4 then every 8 weeks  N        02/20/2023  Simponi ARIA 12.5 mg/mL intravenous solution  Administer SIMPONI ARIA 2mg/kg IV every 8 weeks  N        09/01/2021  sulfamethoxazole/trimethoprim  take 1 tablet by oral route 1 once a day in the am and in the pm  N        08/02/2023  tolterodine 2 mg tablet  take 1 Tablet by oral route 2 times every day  N        06/08/2021  Vitamin D3 50 mcg (2,000 unit) tablet    N        11/25/2019  Voltaren 1 % topical gel  apply (2G)  by topical route 4 times every day to the affected area(s)  N

## 2024-06-18 ENCOUNTER — APPOINTMENT (OUTPATIENT)
Facility: HOSPITAL | Age: 74
DRG: 504 | End: 2024-06-18
Payer: MEDICARE

## 2024-06-18 ENCOUNTER — APPOINTMENT (OUTPATIENT)
Facility: HOSPITAL | Age: 74
DRG: 504 | End: 2024-06-18
Attending: EMERGENCY MEDICINE
Payer: MEDICARE

## 2024-06-18 ENCOUNTER — HOSPITAL ENCOUNTER (INPATIENT)
Facility: HOSPITAL | Age: 74
LOS: 4 days | Discharge: HOME | DRG: 504 | End: 2024-06-22
Attending: EMERGENCY MEDICINE | Admitting: HOSPITALIST
Payer: MEDICARE

## 2024-06-18 DIAGNOSIS — M86.172 OTHER ACUTE OSTEOMYELITIS OF LEFT FOOT (HCC): ICD-10-CM

## 2024-06-18 DIAGNOSIS — M86.9 OSTEOMYELITIS OF TOE (HCC): Primary | ICD-10-CM

## 2024-06-18 PROBLEM — I73.9 PAD (PERIPHERAL ARTERY DISEASE) (HCC): Status: ACTIVE | Noted: 2024-06-18

## 2024-06-18 PROBLEM — I48.0 PAF (PAROXYSMAL ATRIAL FIBRILLATION) (HCC): Status: ACTIVE | Noted: 2024-06-18

## 2024-06-18 PROBLEM — R13.10 DYSPHAGIA: Status: ACTIVE | Noted: 2024-06-18

## 2024-06-18 LAB
ALBUMIN SERPL-MCNC: 3 G/DL (ref 3.4–5)
ALBUMIN/GLOB SERPL: 0.8 (ref 0.8–1.7)
ALP SERPL-CCNC: 101 U/L (ref 45–117)
ALT SERPL-CCNC: 29 U/L (ref 16–61)
ANION GAP SERPL CALC-SCNC: 5 MMOL/L (ref 3–18)
APPEARANCE UR: CLEAR
AST SERPL-CCNC: 34 U/L (ref 10–38)
BACTERIA URNS QL MICRO: NEGATIVE /HPF
BASOPHILS # BLD: 0 K/UL (ref 0–0.1)
BASOPHILS NFR BLD: 0 % (ref 0–2)
BILIRUB SERPL-MCNC: 0.4 MG/DL (ref 0.2–1)
BILIRUB UR QL: NEGATIVE
BUN SERPL-MCNC: 26 MG/DL (ref 7–18)
BUN/CREAT SERPL: 28 (ref 12–20)
CALCIUM SERPL-MCNC: 8.9 MG/DL (ref 8.5–10.1)
CHLORIDE SERPL-SCNC: 104 MMOL/L (ref 100–111)
CO2 SERPL-SCNC: 31 MMOL/L (ref 21–32)
COLOR UR: YELLOW
CREAT SERPL-MCNC: 0.93 MG/DL (ref 0.6–1.3)
DIFFERENTIAL METHOD BLD: ABNORMAL
ECHO BSA: 1.67 M2
EKG ATRIAL RATE: 73 BPM
EKG DIAGNOSIS: NORMAL
EKG P AXIS: 43 DEGREES
EKG P-R INTERVAL: 186 MS
EKG Q-T INTERVAL: 406 MS
EKG QRS DURATION: 90 MS
EKG QTC CALCULATION (BAZETT): 447 MS
EKG R AXIS: -24 DEGREES
EKG T AXIS: 42 DEGREES
EKG VENTRICULAR RATE: 73 BPM
EOSINOPHIL # BLD: 0.3 K/UL (ref 0–0.4)
EOSINOPHIL NFR BLD: 3 % (ref 0–5)
EPITH CASTS URNS QL MICRO: NEGATIVE /LPF (ref 0–5)
ERYTHROCYTE [DISTWIDTH] IN BLOOD BY AUTOMATED COUNT: 13.9 % (ref 11.6–14.5)
GLOBULIN SER CALC-MCNC: 3.8 G/DL (ref 2–4)
GLUCOSE SERPL-MCNC: 106 MG/DL (ref 74–99)
GLUCOSE UR STRIP.AUTO-MCNC: NEGATIVE MG/DL
HCT VFR BLD AUTO: 37 % (ref 36–48)
HGB BLD-MCNC: 11.5 G/DL (ref 13–16)
HGB UR QL STRIP: ABNORMAL
IMM GRANULOCYTES # BLD AUTO: 0 K/UL (ref 0–0.04)
IMM GRANULOCYTES NFR BLD AUTO: 0 % (ref 0–0.5)
KETONES UR QL STRIP.AUTO: NEGATIVE MG/DL
LACTATE SERPL-SCNC: 1.4 MMOL/L (ref 0.4–2)
LEUKOCYTE ESTERASE UR QL STRIP.AUTO: NEGATIVE
LYMPHOCYTES # BLD: 1.4 K/UL (ref 0.9–3.6)
LYMPHOCYTES NFR BLD: 15 % (ref 21–52)
MCH RBC QN AUTO: 29.3 PG (ref 24–34)
MCHC RBC AUTO-ENTMCNC: 31.1 G/DL (ref 31–37)
MCV RBC AUTO: 94.1 FL (ref 78–100)
MONOCYTES # BLD: 1.1 K/UL (ref 0.05–1.2)
MONOCYTES NFR BLD: 12 % (ref 3–10)
NEUTS SEG # BLD: 6.5 K/UL (ref 1.8–8)
NEUTS SEG NFR BLD: 69 % (ref 40–73)
NITRITE UR QL STRIP.AUTO: NEGATIVE
NRBC # BLD: 0 K/UL (ref 0–0.01)
NRBC BLD-RTO: 0 PER 100 WBC
PH UR STRIP: 8.5 (ref 5–8)
PLATELET # BLD AUTO: 308 K/UL (ref 135–420)
PMV BLD AUTO: 10.1 FL (ref 9.2–11.8)
POTASSIUM SERPL-SCNC: 4.6 MMOL/L (ref 3.5–5.5)
PROT SERPL-MCNC: 6.8 G/DL (ref 6.4–8.2)
PROT UR STRIP-MCNC: NEGATIVE MG/DL
RBC # BLD AUTO: 3.93 M/UL (ref 4.35–5.65)
RBC #/AREA URNS HPF: NORMAL /HPF (ref 0–5)
SODIUM SERPL-SCNC: 140 MMOL/L (ref 136–145)
SP GR UR REFRACTOMETRY: 1.01 (ref 1–1.03)
UROBILINOGEN UR QL STRIP.AUTO: 0.2 EU/DL (ref 0.2–1)
WBC # BLD AUTO: 9.4 K/UL (ref 4.6–13.2)
WBC URNS QL MICRO: NORMAL /HPF (ref 0–5)

## 2024-06-18 PROCEDURE — 2580000003 HC RX 258: Performed by: HOSPITALIST

## 2024-06-18 PROCEDURE — 83605 ASSAY OF LACTIC ACID: CPT

## 2024-06-18 PROCEDURE — 6360000002 HC RX W HCPCS: Performed by: EMERGENCY MEDICINE

## 2024-06-18 PROCEDURE — 80053 COMPREHEN METABOLIC PANEL: CPT

## 2024-06-18 PROCEDURE — 93971 EXTREMITY STUDY: CPT

## 2024-06-18 PROCEDURE — 1100000000 HC RM PRIVATE

## 2024-06-18 PROCEDURE — 6370000000 HC RX 637 (ALT 250 FOR IP): Performed by: HOSPITALIST

## 2024-06-18 PROCEDURE — 73630 X-RAY EXAM OF FOOT: CPT

## 2024-06-18 PROCEDURE — 71045 X-RAY EXAM CHEST 1 VIEW: CPT

## 2024-06-18 PROCEDURE — 87040 BLOOD CULTURE FOR BACTERIA: CPT

## 2024-06-18 PROCEDURE — 2580000003 HC RX 258: Performed by: EMERGENCY MEDICINE

## 2024-06-18 PROCEDURE — 85025 COMPLETE CBC W/AUTO DIFF WBC: CPT

## 2024-06-18 PROCEDURE — 81001 URINALYSIS AUTO W/SCOPE: CPT

## 2024-06-18 PROCEDURE — 99285 EMERGENCY DEPT VISIT HI MDM: CPT

## 2024-06-18 PROCEDURE — 6360000002 HC RX W HCPCS: Performed by: HOSPITALIST

## 2024-06-18 RX ORDER — ONDANSETRON 4 MG/1
4 TABLET, ORALLY DISINTEGRATING ORAL EVERY 8 HOURS PRN
Status: DISCONTINUED | OUTPATIENT
Start: 2024-06-18 | End: 2024-06-22 | Stop reason: HOSPADM

## 2024-06-18 RX ORDER — TAMSULOSIN HYDROCHLORIDE 0.4 MG/1
0.4 CAPSULE ORAL DAILY
Status: DISCONTINUED | OUTPATIENT
Start: 2024-06-18 | End: 2024-06-22 | Stop reason: HOSPADM

## 2024-06-18 RX ORDER — PREDNISONE 10 MG/1
10 TABLET ORAL DAILY
Status: DISCONTINUED | OUTPATIENT
Start: 2024-06-18 | End: 2024-06-22 | Stop reason: HOSPADM

## 2024-06-18 RX ORDER — POTASSIUM CHLORIDE 20 MEQ/1
40 TABLET, EXTENDED RELEASE ORAL PRN
Status: DISCONTINUED | OUTPATIENT
Start: 2024-06-18 | End: 2024-06-22 | Stop reason: HOSPADM

## 2024-06-18 RX ORDER — SODIUM CHLORIDE 9 MG/ML
INJECTION, SOLUTION INTRAVENOUS PRN
Status: DISCONTINUED | OUTPATIENT
Start: 2024-06-18 | End: 2024-06-22 | Stop reason: HOSPADM

## 2024-06-18 RX ORDER — ONDANSETRON 2 MG/ML
4 INJECTION INTRAMUSCULAR; INTRAVENOUS EVERY 6 HOURS PRN
Status: DISCONTINUED | OUTPATIENT
Start: 2024-06-18 | End: 2024-06-22 | Stop reason: HOSPADM

## 2024-06-18 RX ORDER — ACETAMINOPHEN 650 MG/1
650 SUPPOSITORY RECTAL EVERY 6 HOURS PRN
Status: DISCONTINUED | OUTPATIENT
Start: 2024-06-18 | End: 2024-06-22 | Stop reason: HOSPADM

## 2024-06-18 RX ORDER — MAGNESIUM SULFATE IN WATER 40 MG/ML
2000 INJECTION, SOLUTION INTRAVENOUS PRN
Status: DISCONTINUED | OUTPATIENT
Start: 2024-06-18 | End: 2024-06-22 | Stop reason: HOSPADM

## 2024-06-18 RX ORDER — SODIUM CHLORIDE 0.9 % (FLUSH) 0.9 %
5-40 SYRINGE (ML) INJECTION PRN
Status: DISCONTINUED | OUTPATIENT
Start: 2024-06-18 | End: 2024-06-22 | Stop reason: HOSPADM

## 2024-06-18 RX ORDER — SODIUM CHLORIDE 0.9 % (FLUSH) 0.9 %
5-40 SYRINGE (ML) INJECTION EVERY 12 HOURS SCHEDULED
Status: DISCONTINUED | OUTPATIENT
Start: 2024-06-18 | End: 2024-06-22 | Stop reason: HOSPADM

## 2024-06-18 RX ORDER — CHOLESTYRAMINE 4 G/9G
1 POWDER, FOR SUSPENSION ORAL 2 TIMES DAILY
Status: DISCONTINUED | OUTPATIENT
Start: 2024-06-18 | End: 2024-06-18

## 2024-06-18 RX ORDER — ALLOPURINOL 300 MG/1
300 TABLET ORAL DAILY
Status: DISCONTINUED | OUTPATIENT
Start: 2024-06-18 | End: 2024-06-22 | Stop reason: HOSPADM

## 2024-06-18 RX ORDER — POTASSIUM CHLORIDE 7.45 MG/ML
10 INJECTION INTRAVENOUS PRN
Status: DISCONTINUED | OUTPATIENT
Start: 2024-06-18 | End: 2024-06-22 | Stop reason: HOSPADM

## 2024-06-18 RX ORDER — POLYETHYLENE GLYCOL 3350 17 G/17G
17 POWDER, FOR SOLUTION ORAL DAILY PRN
Status: DISCONTINUED | OUTPATIENT
Start: 2024-06-18 | End: 2024-06-22 | Stop reason: HOSPADM

## 2024-06-18 RX ORDER — LOSARTAN POTASSIUM 25 MG/1
25 TABLET ORAL DAILY
Status: DISCONTINUED | OUTPATIENT
Start: 2024-06-18 | End: 2024-06-22 | Stop reason: HOSPADM

## 2024-06-18 RX ORDER — MONTELUKAST SODIUM 10 MG/1
10 TABLET ORAL EVERY OTHER DAY
Status: DISCONTINUED | OUTPATIENT
Start: 2024-06-18 | End: 2024-06-22 | Stop reason: HOSPADM

## 2024-06-18 RX ORDER — ATORVASTATIN CALCIUM 20 MG/1
80 TABLET, FILM COATED ORAL DAILY
Status: DISCONTINUED | OUTPATIENT
Start: 2024-06-18 | End: 2024-06-22 | Stop reason: HOSPADM

## 2024-06-18 RX ORDER — ACETAMINOPHEN 325 MG/1
650 TABLET ORAL EVERY 6 HOURS PRN
Status: DISCONTINUED | OUTPATIENT
Start: 2024-06-18 | End: 2024-06-22 | Stop reason: HOSPADM

## 2024-06-18 RX ORDER — AMLODIPINE BESYLATE 5 MG/1
10 TABLET ORAL DAILY
Status: DISCONTINUED | OUTPATIENT
Start: 2024-06-18 | End: 2024-06-22 | Stop reason: HOSPADM

## 2024-06-18 RX ORDER — FOLIC ACID 1 MG/1
1 TABLET ORAL DAILY
Status: DISCONTINUED | OUTPATIENT
Start: 2024-06-18 | End: 2024-06-22 | Stop reason: HOSPADM

## 2024-06-18 RX ADMIN — PIPERACILLIN AND TAZOBACTAM 3375 MG: 3; .375 INJECTION, POWDER, LYOPHILIZED, FOR SOLUTION INTRAVENOUS at 15:34

## 2024-06-18 RX ADMIN — ACETAMINOPHEN 650 MG: 325 TABLET ORAL at 17:54

## 2024-06-18 RX ADMIN — ALLOPURINOL 300 MG: 300 TABLET ORAL at 15:34

## 2024-06-18 RX ADMIN — TAMSULOSIN HYDROCHLORIDE 0.4 MG: 0.4 CAPSULE ORAL at 18:37

## 2024-06-18 RX ADMIN — SERTRALINE 100 MG: 50 TABLET, FILM COATED ORAL at 23:11

## 2024-06-18 RX ADMIN — VANCOMYCIN HYDROCHLORIDE 1500 MG: 10 INJECTION, POWDER, LYOPHILIZED, FOR SOLUTION INTRAVENOUS at 17:09

## 2024-06-18 RX ADMIN — AMLODIPINE BESYLATE 10 MG: 5 TABLET ORAL at 18:38

## 2024-06-18 RX ADMIN — SODIUM CHLORIDE, PRESERVATIVE FREE 10 ML: 5 INJECTION INTRAVENOUS at 23:25

## 2024-06-18 ASSESSMENT — PAIN - FUNCTIONAL ASSESSMENT: PAIN_FUNCTIONAL_ASSESSMENT: 0-10

## 2024-06-18 ASSESSMENT — PAIN DESCRIPTION - LOCATION: LOCATION: TOE (COMMENT WHICH ONE)

## 2024-06-18 ASSESSMENT — PAIN SCALES - GENERAL
PAINLEVEL_OUTOF10: 0
PAINLEVEL_OUTOF10: 4

## 2024-06-18 ASSESSMENT — PAIN DESCRIPTION - DESCRIPTORS: DESCRIPTORS: ACHING

## 2024-06-18 ASSESSMENT — PAIN DESCRIPTION - ORIENTATION: ORIENTATION: LEFT

## 2024-06-18 NOTE — ED PROVIDER NOTES
assessed at time of CT   given lack of prior examinations.      Electronically signed by Renzo Tadeo      XR FOOT LEFT (MIN 3 VIEWS)   Final Result   1. Soft tissue swelling of the second toe with findings consistent with   osteomyelitis of the tuft of the distal phalanx.   2. Hallux amputation at level of proximal metatarsal.      Electronically signed by Marco Jesus MD              PROCEDURES   Unless otherwise noted below, none  Procedures       CRITICAL CARE TIME        SCREENINGS   NIH Stroke Score       Heart Score       Curb-65          EMERGENCY DEPARTMENT COURSE and DIFFERENTIAL DIAGNOSIS/MDM   Vitals:    Vitals:    06/18/24 0902   BP: (!) 153/109   Pulse: 87   Resp: 14   Temp: 98.2 °F (36.8 °C)   TempSrc: Oral   SpO2: 98%   Weight: 59 kg (130 lb)   Height: 1.702 m (5' 7\")            Patient was given the following medications:  Medications - No data to display    CONSULTS: (Who and What was discussed)  IP CONSULT TO PODIATRY      CLINICAL MANAGEMENT TOOLS:  Not Applicable    Chronic Conditions:       Social Determinants affecting Dx or Tx: None    Records Reviewed (source and summary of external notes): Nursing Notes and Old Medical Records    CC/HPI Summary, DDx, ED Course, and Reassessment: ***    Heart Score:   NIHSS:    ED Course as of 06/18/24 1131   Tue Jun 18, 2024   0936 Patient is seen by Dr. Moore from Podiatry [CC]      ED Course User Index  [CC] Cas Batres MD       Disposition Considerations (Tests not done, Shared Decision Making, Pt Expectation of Test or Tx.):      FINAL IMPRESSION   No diagnosis found.      DISPOSITION/PLAN   DISPOSITION        {Disposition (Optional):42099}     PATIENT REFERRED TO:  No follow-up provider specified.       DISCHARGE MEDICATIONS:     Medication List        ASK your doctor about these medications      acetaminophen 325 MG tablet  Commonly known as: TYLENOL     acidophilus     allopurinol 100 MG tablet  Commonly known as: ZYLOPRIM     amLODIPine  10 MG tablet  Commonly known as: NORVASC     aspirin 81 MG EC tablet     atorvastatin 80 MG tablet  Commonly known as: LIPITOR     cholestyramine 4 g packet  Commonly known as: QUESTRAN     diclofenac sodium 1 % Gel  Commonly known as: VOLTAREN     Eliquis 5 MG Tabs tablet  Generic drug: apixaban     FOLIC ACID PO     ipratropium 0.03 % nasal spray  Commonly known as: ATROVENT     levocetirizine 5 MG tablet  Commonly known as: XYZAL     losartan 25 MG tablet  Commonly known as: COZAAR     methotrexate 2.5 MG chemo tablet  Commonly known as: RHEUMATREX     metoprolol tartrate 25 MG tablet  Commonly known as: LOPRESSOR     montelukast 10 MG tablet  Commonly known as: SINGULAIR     multivitamin capsule     omeprazole 20 MG delayed release capsule  Commonly known as: PRILOSEC     predniSONE 5 MG tablet  Commonly known as: DELTASONE     sertraline 100 MG tablet  Commonly known as: ZOLOFT     tamsulosin 0.4 MG capsule  Commonly known as: FLOMAX                DISCONTINUED MEDICATIONS:  Current Discharge Medication List          I am the Primary Clinician of Record.   Cas Batres MD (electronically signed)      (Please note that parts of this dictation were completed with voice recognition software. Quite often unanticipated grammatical, syntax, homophones, and other interpretive errors are inadvertently transcribed by the computer software. Please disregards these errors. Please excuse any errors that have escaped final proofreading.)

## 2024-06-18 NOTE — PROGRESS NOTES
[facility]   Pharmacy Pharmacokinetic Monitoring Service - Vancomycin     Dale Carpio Jr. is a 73 y.o. male starting on vancomycin therapy for Bone and Joint Infection. Pharmacy consulted by Dr. Mark Lujan for monitoring and adjustment.    Target Concentration: Goal AUC/SAMM 400-600 mg*hr/L    Additional Antimicrobials: Zosyn 3.375 g q6h    Pertinent Laboratory Values:   Wt Readings from Last 1 Encounters:   06/18/24 59 kg (130 lb)     Temp Readings from Last 1 Encounters:   06/18/24 98.2 °F (36.8 °C) (Oral)     Estimated Creatinine Clearance: 60 mL/min (based on SCr of 0.93 mg/dL).  Recent Labs     06/18/24  1000   CREATININE 0.93   BUN 26*   WBC 9.4       MRSA Nasal Swab: N/A. Non-respiratory infection.    Plan:  Dosing recommendations based on Bayesian software  Start vancomycin 1500 mg x1 given in ED. Will dose Vancomycin 1500 mg q24h starting on 6/19 @ 1600  Anticipated AUC of 543 mg/L.hr and trough concentration of 12.2 mg/L at steady state  Renal labs as indicated   Pharmacy will continue to monitor patient and adjust therapy as indicated      Temi Da Silva RPH, PharmD  6/18/2024 3:20 PM

## 2024-06-18 NOTE — PROGRESS NOTES
Assumed care of patient at this time. Patient ambulated into room while using a cane. Care ongoing.

## 2024-06-18 NOTE — PROGRESS NOTES
Attempted to call report was advised the receiving RN would call back to retrieve report due to the fact that she is now discharging a patient.

## 2024-06-18 NOTE — PROGRESS NOTES
Bedside shift change report given to Vy RN (oncoming nurse) by Romana Hernandez RN   (offgoing nurse). Report included the following information Nurse Handoff Report, Index, Intake/Output, MAR, and Recent Results.

## 2024-06-19 ENCOUNTER — ANESTHESIA (OUTPATIENT)
Facility: HOSPITAL | Age: 74
End: 2024-06-19
Payer: MEDICARE

## 2024-06-19 ENCOUNTER — APPOINTMENT (OUTPATIENT)
Facility: HOSPITAL | Age: 74
DRG: 504 | End: 2024-06-19
Payer: MEDICARE

## 2024-06-19 ENCOUNTER — ANESTHESIA EVENT (OUTPATIENT)
Facility: HOSPITAL | Age: 74
End: 2024-06-19
Payer: MEDICARE

## 2024-06-19 PROBLEM — L97.524 ULCER OF TOE OF LEFT FOOT, WITH NECROSIS OF BONE (HCC): Status: ACTIVE | Noted: 2024-06-19

## 2024-06-19 PROBLEM — L03.032 CELLULITIS OF SECOND TOE, LEFT: Status: ACTIVE | Noted: 2024-06-19

## 2024-06-19 PROBLEM — M86.072 ACUTE HEMATOGENOUS OSTEOMYELITIS OF LEFT FOOT (HCC): Status: ACTIVE | Noted: 2024-06-19

## 2024-06-19 LAB
ANION GAP SERPL CALC-SCNC: 5 MMOL/L (ref 3–18)
BUN SERPL-MCNC: 24 MG/DL (ref 7–18)
BUN/CREAT SERPL: 27 (ref 12–20)
CALCIUM SERPL-MCNC: 8.9 MG/DL (ref 8.5–10.1)
CHLORIDE SERPL-SCNC: 108 MMOL/L (ref 100–111)
CO2 SERPL-SCNC: 26 MMOL/L (ref 21–32)
CREAT SERPL-MCNC: 0.9 MG/DL (ref 0.6–1.3)
CRP SERPL-MCNC: 7.7 MG/DL (ref 0–0.3)
GLUCOSE BLD STRIP.AUTO-MCNC: 102 MG/DL (ref 70–110)
GLUCOSE BLD STRIP.AUTO-MCNC: 119 MG/DL (ref 70–110)
GLUCOSE BLD STRIP.AUTO-MCNC: 148 MG/DL (ref 70–110)
GLUCOSE BLD STRIP.AUTO-MCNC: 68 MG/DL (ref 70–110)
GLUCOSE SERPL-MCNC: 114 MG/DL (ref 74–99)
POTASSIUM SERPL-SCNC: 4.1 MMOL/L (ref 3.5–5.5)
SODIUM SERPL-SCNC: 139 MMOL/L (ref 136–145)

## 2024-06-19 PROCEDURE — 88305 TISSUE EXAM BY PATHOLOGIST: CPT

## 2024-06-19 PROCEDURE — 73718 MRI LOWER EXTREMITY W/O DYE: CPT

## 2024-06-19 PROCEDURE — 88311 DECALCIFY TISSUE: CPT

## 2024-06-19 PROCEDURE — 87077 CULTURE AEROBIC IDENTIFY: CPT

## 2024-06-19 PROCEDURE — 6360000002 HC RX W HCPCS: Performed by: PODIATRIST

## 2024-06-19 PROCEDURE — 7100000000 HC PACU RECOVERY - FIRST 15 MIN: Performed by: PODIATRIST

## 2024-06-19 PROCEDURE — 3700000000 HC ANESTHESIA ATTENDED CARE: Performed by: PODIATRIST

## 2024-06-19 PROCEDURE — 3700000001 HC ADD 15 MINUTES (ANESTHESIA): Performed by: PODIATRIST

## 2024-06-19 PROCEDURE — 87070 CULTURE OTHR SPECIMN AEROBIC: CPT

## 2024-06-19 PROCEDURE — 87186 SC STD MICRODIL/AGAR DIL: CPT

## 2024-06-19 PROCEDURE — 86140 C-REACTIVE PROTEIN: CPT

## 2024-06-19 PROCEDURE — 6360000002 HC RX W HCPCS

## 2024-06-19 PROCEDURE — 2580000003 HC RX 258: Performed by: PODIATRIST

## 2024-06-19 PROCEDURE — 3600000002 HC SURGERY LEVEL 2 BASE: Performed by: PODIATRIST

## 2024-06-19 PROCEDURE — 2580000003 HC RX 258: Performed by: HOSPITALIST

## 2024-06-19 PROCEDURE — 73620 X-RAY EXAM OF FOOT: CPT

## 2024-06-19 PROCEDURE — 0Y6S0Z0 DETACHMENT AT LEFT 2ND TOE, COMPLETE, OPEN APPROACH: ICD-10-PCS | Performed by: PODIATRIST

## 2024-06-19 PROCEDURE — 3600000012 HC SURGERY LEVEL 2 ADDTL 15MIN: Performed by: PODIATRIST

## 2024-06-19 PROCEDURE — 2709999900 HC NON-CHARGEABLE SUPPLY: Performed by: PODIATRIST

## 2024-06-19 PROCEDURE — 82962 GLUCOSE BLOOD TEST: CPT

## 2024-06-19 PROCEDURE — 6360000004 HC RX CONTRAST MEDICATION: Performed by: HOSPITALIST

## 2024-06-19 PROCEDURE — 6370000000 HC RX 637 (ALT 250 FOR IP): Performed by: INTERNAL MEDICINE

## 2024-06-19 PROCEDURE — 6370000000 HC RX 637 (ALT 250 FOR IP): Performed by: HOSPITALIST

## 2024-06-19 PROCEDURE — 87185 SC STD ENZYME DETCJ PER NZM: CPT

## 2024-06-19 PROCEDURE — 88307 TISSUE EXAM BY PATHOLOGIST: CPT

## 2024-06-19 PROCEDURE — 6360000002 HC RX W HCPCS: Performed by: HOSPITALIST

## 2024-06-19 PROCEDURE — 87205 SMEAR GRAM STAIN: CPT

## 2024-06-19 PROCEDURE — 80048 BASIC METABOLIC PNL TOTAL CA: CPT

## 2024-06-19 PROCEDURE — 36415 COLL VENOUS BLD VENIPUNCTURE: CPT

## 2024-06-19 PROCEDURE — 2500000003 HC RX 250 WO HCPCS

## 2024-06-19 PROCEDURE — 87075 CULTR BACTERIA EXCEPT BLOOD: CPT

## 2024-06-19 PROCEDURE — 1100000000 HC RM PRIVATE

## 2024-06-19 PROCEDURE — 7100000001 HC PACU RECOVERY - ADDTL 15 MIN: Performed by: PODIATRIST

## 2024-06-19 PROCEDURE — 87040 BLOOD CULTURE FOR BACTERIA: CPT

## 2024-06-19 PROCEDURE — 71260 CT THORAX DX C+: CPT

## 2024-06-19 RX ORDER — MIDAZOLAM HYDROCHLORIDE 1 MG/ML
INJECTION INTRAMUSCULAR; INTRAVENOUS PRN
Status: DISCONTINUED | OUTPATIENT
Start: 2024-06-19 | End: 2024-06-19 | Stop reason: SDUPTHER

## 2024-06-19 RX ORDER — SODIUM CHLORIDE 0.9 % (FLUSH) 0.9 %
5-40 SYRINGE (ML) INJECTION EVERY 12 HOURS SCHEDULED
Status: DISCONTINUED | OUTPATIENT
Start: 2024-06-19 | End: 2024-06-19 | Stop reason: HOSPADM

## 2024-06-19 RX ORDER — NALOXONE HYDROCHLORIDE 0.4 MG/ML
INJECTION, SOLUTION INTRAMUSCULAR; INTRAVENOUS; SUBCUTANEOUS PRN
Status: DISCONTINUED | OUTPATIENT
Start: 2024-06-19 | End: 2024-06-19 | Stop reason: HOSPADM

## 2024-06-19 RX ORDER — SODIUM CHLORIDE 9 MG/ML
INJECTION, SOLUTION INTRAVENOUS PRN
Status: DISCONTINUED | OUTPATIENT
Start: 2024-06-19 | End: 2024-06-19 | Stop reason: HOSPADM

## 2024-06-19 RX ORDER — HYDROMORPHONE HYDROCHLORIDE 1 MG/ML
0.5 INJECTION, SOLUTION INTRAMUSCULAR; INTRAVENOUS; SUBCUTANEOUS EVERY 5 MIN PRN
Status: DISCONTINUED | OUTPATIENT
Start: 2024-06-19 | End: 2024-06-19 | Stop reason: HOSPADM

## 2024-06-19 RX ORDER — PROPOFOL 10 MG/ML
INJECTION, EMULSION INTRAVENOUS PRN
Status: DISCONTINUED | OUTPATIENT
Start: 2024-06-19 | End: 2024-06-19 | Stop reason: SDUPTHER

## 2024-06-19 RX ORDER — DROPERIDOL 2.5 MG/ML
0.62 INJECTION, SOLUTION INTRAMUSCULAR; INTRAVENOUS
Status: DISCONTINUED | OUTPATIENT
Start: 2024-06-19 | End: 2024-06-19 | Stop reason: HOSPADM

## 2024-06-19 RX ORDER — LIDOCAINE HYDROCHLORIDE 20 MG/ML
INJECTION, SOLUTION EPIDURAL; INFILTRATION; INTRACAUDAL; PERINEURAL PRN
Status: DISCONTINUED | OUTPATIENT
Start: 2024-06-19 | End: 2024-06-19 | Stop reason: SDUPTHER

## 2024-06-19 RX ORDER — MEPERIDINE HYDROCHLORIDE 50 MG/ML
12.5 INJECTION INTRAMUSCULAR; INTRAVENOUS; SUBCUTANEOUS AS NEEDED
Status: DISCONTINUED | OUTPATIENT
Start: 2024-06-19 | End: 2024-06-19 | Stop reason: HOSPADM

## 2024-06-19 RX ORDER — DIPHENHYDRAMINE HYDROCHLORIDE 50 MG/ML
12.5 INJECTION INTRAMUSCULAR; INTRAVENOUS
Status: DISCONTINUED | OUTPATIENT
Start: 2024-06-19 | End: 2024-06-19 | Stop reason: HOSPADM

## 2024-06-19 RX ORDER — ONDANSETRON 2 MG/ML
4 INJECTION INTRAMUSCULAR; INTRAVENOUS
Status: DISCONTINUED | OUTPATIENT
Start: 2024-06-19 | End: 2024-06-19 | Stop reason: HOSPADM

## 2024-06-19 RX ORDER — SODIUM CHLORIDE, SODIUM LACTATE, POTASSIUM CHLORIDE, CALCIUM CHLORIDE 600; 310; 30; 20 MG/100ML; MG/100ML; MG/100ML; MG/100ML
INJECTION, SOLUTION INTRAVENOUS CONTINUOUS
Status: DISCONTINUED | OUTPATIENT
Start: 2024-06-19 | End: 2024-06-22 | Stop reason: HOSPADM

## 2024-06-19 RX ORDER — SODIUM CHLORIDE, SODIUM LACTATE, POTASSIUM CHLORIDE, CALCIUM CHLORIDE 600; 310; 30; 20 MG/100ML; MG/100ML; MG/100ML; MG/100ML
INJECTION, SOLUTION INTRAVENOUS CONTINUOUS
Status: DISCONTINUED | OUTPATIENT
Start: 2024-06-19 | End: 2024-06-19 | Stop reason: HOSPADM

## 2024-06-19 RX ORDER — PHENYLEPHRINE HCL IN 0.9% NACL 1 MG/10 ML
SYRINGE (ML) INTRAVENOUS PRN
Status: DISCONTINUED | OUTPATIENT
Start: 2024-06-19 | End: 2024-06-19 | Stop reason: SDUPTHER

## 2024-06-19 RX ORDER — FENTANYL CITRATE 50 UG/ML
25 INJECTION, SOLUTION INTRAMUSCULAR; INTRAVENOUS EVERY 5 MIN PRN
Status: DISCONTINUED | OUTPATIENT
Start: 2024-06-19 | End: 2024-06-19 | Stop reason: HOSPADM

## 2024-06-19 RX ORDER — LABETALOL HYDROCHLORIDE 5 MG/ML
10 INJECTION, SOLUTION INTRAVENOUS
Status: DISCONTINUED | OUTPATIENT
Start: 2024-06-19 | End: 2024-06-19 | Stop reason: HOSPADM

## 2024-06-19 RX ORDER — SODIUM CHLORIDE 0.9 % (FLUSH) 0.9 %
5-40 SYRINGE (ML) INJECTION PRN
Status: DISCONTINUED | OUTPATIENT
Start: 2024-06-19 | End: 2024-06-19 | Stop reason: HOSPADM

## 2024-06-19 RX ORDER — IPRATROPIUM BROMIDE AND ALBUTEROL SULFATE 2.5; .5 MG/3ML; MG/3ML
1 SOLUTION RESPIRATORY (INHALATION)
Status: DISCONTINUED | OUTPATIENT
Start: 2024-06-19 | End: 2024-06-19 | Stop reason: HOSPADM

## 2024-06-19 RX ORDER — OXYCODONE HYDROCHLORIDE 5 MG/1
5 TABLET ORAL
Status: DISCONTINUED | OUTPATIENT
Start: 2024-06-19 | End: 2024-06-19 | Stop reason: HOSPADM

## 2024-06-19 RX ADMIN — TAMSULOSIN HYDROCHLORIDE 0.4 MG: 0.4 CAPSULE ORAL at 10:21

## 2024-06-19 RX ADMIN — SERTRALINE 100 MG: 50 TABLET, FILM COATED ORAL at 21:49

## 2024-06-19 RX ADMIN — ACETAMINOPHEN 650 MG: 325 TABLET ORAL at 13:20

## 2024-06-19 RX ADMIN — VANCOMYCIN HYDROCHLORIDE 1500 MG: 10 INJECTION, POWDER, LYOPHILIZED, FOR SOLUTION INTRAVENOUS at 23:50

## 2024-06-19 RX ADMIN — PREDNISONE 10 MG: 10 TABLET ORAL at 10:21

## 2024-06-19 RX ADMIN — IOPAMIDOL 100 ML: 612 INJECTION, SOLUTION INTRAVENOUS at 10:08

## 2024-06-19 RX ADMIN — PROPOFOL 20 MG: 10 INJECTION, EMULSION INTRAVENOUS at 18:43

## 2024-06-19 RX ADMIN — MIDAZOLAM 2 MG: 1 INJECTION INTRAMUSCULAR; INTRAVENOUS at 18:24

## 2024-06-19 RX ADMIN — METOPROLOL TARTRATE 25 MG: 25 TABLET, FILM COATED ORAL at 10:22

## 2024-06-19 RX ADMIN — DICLOFENAC SODIUM 2 G: 10 GEL TOPICAL at 02:19

## 2024-06-19 RX ADMIN — SODIUM CHLORIDE, POTASSIUM CHLORIDE, SODIUM LACTATE AND CALCIUM CHLORIDE: 600; 310; 30; 20 INJECTION, SOLUTION INTRAVENOUS at 16:44

## 2024-06-19 RX ADMIN — FOLIC ACID 1 MG: 1 TABLET ORAL at 10:22

## 2024-06-19 RX ADMIN — Medication 100 MCG: at 19:00

## 2024-06-19 RX ADMIN — AMLODIPINE BESYLATE 10 MG: 5 TABLET ORAL at 10:21

## 2024-06-19 RX ADMIN — PROPOFOL 10 MCG/KG/MIN: 10 INJECTION, EMULSION INTRAVENOUS at 18:33

## 2024-06-19 RX ADMIN — METOPROLOL TARTRATE 25 MG: 25 TABLET, FILM COATED ORAL at 21:49

## 2024-06-19 RX ADMIN — Medication 100 MCG: at 18:52

## 2024-06-19 RX ADMIN — LOSARTAN POTASSIUM 25 MG: 25 TABLET, FILM COATED ORAL at 10:21

## 2024-06-19 RX ADMIN — PROPOFOL 20 MG: 10 INJECTION, EMULSION INTRAVENOUS at 18:29

## 2024-06-19 RX ADMIN — ATORVASTATIN CALCIUM 80 MG: 20 TABLET, FILM COATED ORAL at 10:21

## 2024-06-19 RX ADMIN — SODIUM CHLORIDE, PRESERVATIVE FREE 10 ML: 5 INJECTION INTRAVENOUS at 22:14

## 2024-06-19 RX ADMIN — LIDOCAINE HYDROCHLORIDE 40 MG: 20 INJECTION, SOLUTION EPIDURAL; INFILTRATION; INTRACAUDAL; PERINEURAL at 18:29

## 2024-06-19 RX ADMIN — ACETAMINOPHEN 650 MG: 325 TABLET ORAL at 22:23

## 2024-06-19 RX ADMIN — ACETAMINOPHEN 650 MG: 325 TABLET ORAL at 02:18

## 2024-06-19 RX ADMIN — SODIUM CHLORIDE, POTASSIUM CHLORIDE, SODIUM LACTATE AND CALCIUM CHLORIDE: 600; 310; 30; 20 INJECTION, SOLUTION INTRAVENOUS at 22:12

## 2024-06-19 RX ADMIN — SODIUM CHLORIDE, PRESERVATIVE FREE 10 ML: 5 INJECTION INTRAVENOUS at 10:33

## 2024-06-19 RX ADMIN — PIPERACILLIN AND TAZOBACTAM 3375 MG: 3; .375 INJECTION, POWDER, LYOPHILIZED, FOR SOLUTION INTRAVENOUS at 22:30

## 2024-06-19 ASSESSMENT — PAIN DESCRIPTION - LOCATION
LOCATION: FOOT

## 2024-06-19 ASSESSMENT — PAIN - FUNCTIONAL ASSESSMENT: PAIN_FUNCTIONAL_ASSESSMENT: 0-10

## 2024-06-19 ASSESSMENT — PAIN SCALES - GENERAL
PAINLEVEL_OUTOF10: 0
PAINLEVEL_OUTOF10: 7
PAINLEVEL_OUTOF10: 0
PAINLEVEL_OUTOF10: 1
PAINLEVEL_OUTOF10: 5
PAINLEVEL_OUTOF10: 0
PAINLEVEL_OUTOF10: 4
PAINLEVEL_OUTOF10: 5
PAINLEVEL_OUTOF10: 0
PAINLEVEL_OUTOF10: 0
PAINLEVEL_OUTOF10: 3
PAINLEVEL_OUTOF10: 5
PAINLEVEL_OUTOF10: 0

## 2024-06-19 ASSESSMENT — PAIN DESCRIPTION - ORIENTATION
ORIENTATION: LEFT

## 2024-06-19 ASSESSMENT — PAIN DESCRIPTION - DESCRIPTORS
DESCRIPTORS: ACHING;SORE
DESCRIPTORS: NAGGING
DESCRIPTORS: NAGGING;DISCOMFORT
DESCRIPTORS: SHARP
DESCRIPTORS: ACHING

## 2024-06-19 ASSESSMENT — PAIN DESCRIPTION - PAIN TYPE
TYPE: SURGICAL PAIN
TYPE: ACUTE PAIN;CHRONIC PAIN

## 2024-06-19 ASSESSMENT — PAIN DESCRIPTION - ONSET: ONSET: GRADUAL

## 2024-06-19 ASSESSMENT — PAIN DESCRIPTION - FREQUENCY: FREQUENCY: INTERMITTENT

## 2024-06-19 NOTE — CONSULTS
Brief Afar/ID Note    Skulking chart and saw him on my list (reassigned to Dr Carbone, as I am rounding off her list this week) - chart(s) reviewed here/milton Edmondson NOT seen yet but will do tomorrow    Had L Hallux amputation at [facility] 2/11 with recovery of MRSE from OR resulting in 4wk IV vancomycin afterwards.  Had earlier MSSA and Candidemia BSI earlier in JAN24 with no further sequelae.    Admitted with likely L 2nd toe OM - awaiting whittling tomorrow (likely).  Hemodynamically stable/afebrile  Hold the abx until in preOP - then release the hounds.    I'll see tomorrow around his surgery.    Sascha Jones MD  Cell [phone]  [facility]

## 2024-06-19 NOTE — PROGRESS NOTES
Podiatry:    Patient seen at bedside today for evaluation and treatment of open ulcer at the end of his left second toe with osteomyelitis in the distal phalanx.  Patient will require surgical correction with partial amputation of the left second toe.  Will try and schedule the patient for tomorrow, Wednesday evening for surgery at 6 PM.  I will try for surgical correction of osteomyelitis with removal of all infected bone, and hopefully the patient will not require long-term IV antibiotics.

## 2024-06-19 NOTE — PLAN OF CARE
Problem: Pain  Goal: Verbalizes/displays adequate comfort level or baseline comfort level  6/19/2024 0047 by Vy Agustin RN  Outcome: Progressing  6/18/2024 1859 by Romana Hernandez RN  Outcome: Progressing     Problem: Safety - Adult  Goal: Free from fall injury  6/19/2024 0047 by Vy Agustin, RN  Outcome: Progressing  6/18/2024 1859 by Romana Hernandez RN  Outcome: Progressing

## 2024-06-19 NOTE — PROGRESS NOTES
non-face-to-face service time visit on the date of service such as  Preparing to see the patient (eg, review of tests)  Obtaining and/or reviewing separately obtained history  Performing a medically necessary appropriate examination and/or evaluation  Counseling and educating the patient/family/caregiver  Ordering medications, tests, or procedures  Referring and communicating with other health care professionals as needed  Documenting clinical information in the electronic or other health record  Independently interpreting results (not reported separately) and communicating results to the patient/family/caregiver  Care coordination and discharge planning with Case Management.

## 2024-06-19 NOTE — PROGRESS NOTES
Spoke with Dr. Michael     Patient to continue his tube feedings overnight.  Consult added for I&D .  Would like MRI on the Left Foot for the affected toe. Okay with MRI being routine in the AM.

## 2024-06-19 NOTE — CONSULTS
Podiatry Consult    Subjective:         Date of Consultation: June 18, 2024     Referring Physician: Dr. Cas Baca    Patient is a 73 y.o.  male who was seen today for evaluation and treatment of infected decubitus ulcer distal left second toe with osteomyelitis.  Patient is status post first ray amputation, at the proximal metatarsal shaft, in February 2024 due to infection, and presents today with a hammertoe deformity of the left second toe resulting in his distal ulcer and infection.    Patient Active Problem List    Diagnosis Date Noted    Calculus of distal right ureter 12/09/2021    Enlarged prostate with lower urinary tract symptoms (LUTS) 12/04/2016    Calculus of both kidneys 12/04/2016    Male erectile dysfunction, unspecified 12/18/2017    Osteomyelitis of second toe of left foot ([facility]) 06/18/2024    Dysphagia 06/18/2024    PAF (paroxysmal atrial fibrillation) ([facility]) 06/18/2024    PAD (peripheral artery disease) ([facility]) 06/18/2024    S/P laparoscopic fundoplication 06/11/2021    HTN (hypertension) 06/11/2021    Hiatal hernia with gastroesophageal reflux 06/11/2021    Asthma 06/11/2021    Infection and inflammatory reaction due to other internal orthopedic prosthetic devices, implants and grafts, initial encounter ([facility]) 01/29/2020    Osteomyelitis of ankle or foot, acute, left ([facility]) 01/27/2020    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

## 2024-06-19 NOTE — BRIEF OP NOTE
Brief Postoperative Note      Patient: Dale Carpio Jr.  YOB: 1950  MRN: [id number]    Date of Procedure: 6/19/2024    Preoperative diagnosis: Grade 3 Shepard ulcer distal left second toe.  Osteomyelitis distal phalanx left second toe.  Cellulitis left second toe    Postoperative diagnosis: Same as above.    Procedures performed: Partial amputation left second toe at the DIP joint level.  Incision and drainage left second toe complicated.  Bone biopsy left second toe middle phalanx to rule out further osteomyelitis.    Surgeon(s):  Mejia Michael DPM    Assistant:  Surgical Assistant: Kings Bobo    Anesthesia: General    Estimated Blood Loss (mL): Minimal    Complications: None    Specimens:   ID Type Source Tests Collected by Time Destination   1 : LEFT SECOND TWO WOUND Swab Foot CULTURE, ANAEROBIC, GRAM STAIN, CULTURE, WOUND Mejia Michael DPM 6/19/2024 1845    A : LEFT SECOND TOE DISTAL PHALANX Tissue Foot SURGICAL PATHOLOGY Mejia Michael DPM 6/19/2024 1851    B : BONE BIOPSY LEFT SECOND TOE MIDDLE PHALANX Tissue Foot SURGICAL PATHOLOGY Mejia Michael DPM 6/19/2024 1851        Implants:  * No implants in log *      Drains:   Gastrostomy/Enterostomy/Jejunostomy Tube Percutaneous Endoscopic Gastrostomy (PEG) (Active)       Findings:  Infection Present At Time Of Surgery (PATOS) (choose all levels that have infection present):  - Deep Infection (muscle/fascia) present as evidenced by abscess, pus, purulent fluid, and fluid consistent with infection  Other Findings: Necrotic left second toe distal phalanx secondary to osteomyelitis.  Deep abscess left second toe with cellulitis and distal ulcer.    Electronically signed by Mejia Michael DPM on 6/19/2024 at 7:23 PM

## 2024-06-19 NOTE — PROGRESS NOTES
HR > 130's per tele monitoring tech.  Assessment complete per RN.  Noted patient OOB to bathroom, no complaints of chest pain nor discomfort.  Assisted back to bed, heart rate stabilized, safety measures in place, call bell in reach.    @ 0950 off unit to radiology for CT of chest, left in no acute distress.

## 2024-06-19 NOTE — PROGRESS NOTES
Bedside and Verbal shift change report given to KAREN Solorio (oncoming nurse) by KAREN Mcclellan (offgoing nurse). Report included the following information Nurse Handoff Report, Adult Overview, Intake/Output, MAR, Recent Results, Cardiac Rhythm SR/ST, and Event Log.

## 2024-06-19 NOTE — PERIOP NOTE
TRANSFER - IN REPORT:    Verbal report received from KAREN Solorio on Dale Carpio Jr.  being received from  for ordered procedure      Report consisted of patient's Situation, Background, Assessment and   Recommendations(SBAR).     Information from the following report(s) Nurse Handoff Report, Intake/Output, MAR, and Pre Procedure Checklist was reviewed with the receiving nurse.    Opportunity for questions and clarification was provided.      Assessment completed upon patient's arrival to unit and care assumed.

## 2024-06-19 NOTE — PERIOP NOTE
TRANSFER - IN REPORT:    Verbal report received from OR, RN on Dale Carpio Jr.  being received from OR for routine progression of patient care      Report consisted of patient's Situation, Background, Assessment and   Recommendations(SBAR).     Information from the following report(s) Adult Overview, Surgery Report, Intake/Output, and MAR was reviewed with the receiving nurse.    Opportunity for questions and clarification was provided.      Assessment completed upon patient's arrival to unit and care assumed.

## 2024-06-19 NOTE — H&P
Exam:     Physical Exam:  BP (!) 117/54   Pulse 91   Temp 99.5 °F (37.5 °C) (Oral)   Resp 18   Ht 1.702 m (5' 7\")   Wt 59 kg (130 lb)   SpO2 96%   BMI 20.36 kg/m²         Temp (24hrs), Av.6 °F (37 °C), Min:98.2 °F (36.8 °C), Max:99.5 °F (37.5 °C)     1901 -  0700  In: -   Out: 250 [Urine:250]   No intake/output data recorded.      General:  Awake, cooperative, no distress.   Head:  Normocephalic, without obvious abnormality, atraumatic.   Eyes:  Conjunctivae/corneas clear, sclera anicteric, PERRL, EOMs intact.   Nose: Nares normal. No drainage or sinus tenderness.   Throat: Lips, mucosa, and tongue normal.    Neck: Supple, symmetrical, trachea midline, no adenopathy.       Lungs:   Clear to auscultation bilaterally.       Heart:  Regular rate and rhythm, S1, S2 normal, no murmur, click, rub or gallop.     Abdomen: Soft, non-tender.  G-tube in place, bowel sounds normal. No masses,  No organomegaly.   Extremities: Left great toe amputation, left second toe with swelling and redness, TTP   Pulses: 1+ and symmetric all extremities.   Skin: Skin color pink/pale/mottled/flushed, turgor normal. No rashes or lesions   Neurologic: CNII-XII intact. No focal motor or sensory deficit.       Labs Reviewed:  Labs: Results:       Chemistry Recent Labs     24  1000      K 4.6      CO2 31   BUN 26*   GLOB 3.8   ,No results found for: \"LACTA\"   CBC w/Diff Recent Labs     24  1000   WBC 9.4   RBC 3.93*   HGB 11.5*   HCT 37.0         Cardiac Enzymes No results found for: \"CKTOTAL\", \"CKMB\", \"CKMBINDEX\", \"TROPONINI\", \"TROPHS\",No results found for: \"BNP\"   Coagulation No results for input(s): \"INR\", \"APTT\" in the last 72 hours.    Invalid input(s): \"PTP\"    Lipid Panel No results found for: \"CHOL\", \"CHOLPOCT\", \"CHLST\", \"CHOLV\", \"163182\", \"HDL\", \"HDLC\", \"LDL\", \"VLDLC\", \"VLDL\"   Pancreas No results for input(s): \"LIPASE\" in the last 72 hours.,No results for input(s): \"AMYLASE\" in the last

## 2024-06-19 NOTE — PROGRESS NOTES
2310  Patient requesting Voltaren gel for pain in L foot, this RN messaged Dr. Ruiz on [employer] informing MD of pt request. Md requested this RN to input order.     2315  Patient self administered Jevity 325mL feeding with 30mL water bolus before and after feeding.

## 2024-06-19 NOTE — PROGRESS NOTES
Podiatry:    Patient is status post left foot surgery with partial amputation left second toe, I&D and bone biopsy.  Patient should continue IV antibiotics as per infectious disease.  He is partial weightbearing left foot for bathroom privileges.  Nursing should start daily dressing changes left foot tomorrow.  Intraoperative wound cultures were taken of the left second toe for infectious disease reference, and bone biopsy of the left middle phalanx was taken to rule out further osteomyelitis.  Anticipate surgical cure for osteomyelitis pending bone biopsy of the middle phalanx.  So hopefully patient will not require long-term IV antibiotics.  Patient surgical wound was closed, so we need the IV antibiotics to clear the soft tissue infection before he is discharged.  I will follow the patient in my office after discharge.

## 2024-06-19 NOTE — PROGRESS NOTES
TRANSFER - OUT REPORT:    Verbal report given to Tracie JONES on Dale Carpio Jr.  being transferred to PACU for ordered procedure       Report consisted of patient's Situation, Background, Assessment and   Recommendations(SBAR).     Information from the following report(s) Nurse Handoff Report, Surgery Report, Intake/Output, MAR, Recent Results, Cardiac Rhythm SR, and Pre Procedure Checklist was reviewed with the receiving nurse.           Lines:   Peripheral IV 06/18/24 Right Antecubital (Active)   Site Assessment Clean, dry & intact 06/19/24 0900   Line Status Flushed;Normal saline locked 06/19/24 0900   Line Care Cap changed;Connections checked and tightened;Ports disinfected 06/19/24 0900   Phlebitis Assessment No symptoms 06/19/24 0900   Infiltration Assessment 0 06/19/24 0900   Alcohol Cap Used Yes 06/19/24 0900   Dressing Status Clean, dry & intact 06/19/24 0900   Dressing Type Transparent 06/19/24 0900   Dressing Intervention Other (Comment) 06/18/24 8736        Opportunity for questions and clarification was provided.      Patient transported with:  Tech

## 2024-06-19 NOTE — PLAN OF CARE
Problem: Pain  Goal: Verbalizes/displays adequate comfort level or baseline comfort level  6/19/2024 0047 by Vy Agustin, RN  Outcome: Progressing     Problem: Safety - Adult  Goal: Free from fall injury  6/19/2024 1146 by Lyndsey Tadeo RN  Outcome: Progressing  6/19/2024 0047 by Vy Agustin, RN  Outcome: Progressing     Problem: Skin/Tissue Integrity  Goal: Absence of new skin breakdown  Description: 1.  Monitor for areas of redness and/or skin breakdown  2.  Assess vascular access sites hourly  3.  Every 4-6 hours minimum:  Change oxygen saturation probe site  4.  Every 4-6 hours:  If on nasal continuous positive airway pressure, respiratory therapy assess nares and determine need for appliance change or resting period.  Outcome: Progressing

## 2024-06-19 NOTE — ANESTHESIA POSTPROCEDURE EVALUATION
Post-Anesthesia Evaluation and Assessment    Cardiovascular Function/Vital Signs  Visit Vitals  /74   Pulse 78   Temp 97.5 °F (36.4 °C)   Resp 23   Ht 1.702 m (5' 7.01\")   Wt 59 kg (130 lb)   SpO2 98%   BMI 20.36 kg/m²       Patient is status post Procedure(s):  FOOT DEBRIDEMENT INCISION AND DRAINAGE AND AMPUTATION OF SECOND TOE ON THE LEFT FOOT.    Nausea/Vomiting: Controlled.    Postoperative hydration reviewed and adequate.    Pain:      Managed.    Neurological Status:       At baseline.    Mental Status and Level of Consciousness: Progressing to baseline appropriately     Pulmonary Status:       Adequate oxygenation and airway patent.    Complications related to anesthesia: None    Post-anesthesia assessment completed. No concerns.        Signed By: IONA ROCA MD    June 19, 2024

## 2024-06-19 NOTE — CONSULTS
[facility]  (A Division of [facility])      Consultation Note      Date of Admission: 6/18/2024      Date of Note: 6/19/2024      Reason for Referral: L foot OM        Current Antimicrobials:    Prior Antimicrobials:  Pip/tzb IV (6/18-) #1 dose given/held since  Vancomycin IV (6/18-) #1 dose given/held since none       Assessment: Rec / Plan:   L 2nd Toe Ulcer/OM  6/19:  CRP sent  Awaiting surgery later today - then release the hounds (abx); will help with recovery of any pathogens.  Will leave on pip/tzb + vanco until MICRO clears -- and surgeon's assessment of any residual disease/infection.  IF (+), then he goes long with 6wks IV abx directed at what grows. ->pip/tzb + vanco held    Surgery today  Release the abx afterwards,  I\"ll scut MICRO and be back on Friday.    Thanks for consult.   HTN    RA    GEORGETTE    Gout          Microbiology:  6/19 - BCx sent     6/18 - BCx (-)       2/11/24 - [facility] OR (+) MRSE      Lines / Catheters: peripheral      HPI:  CC:  \"My foot is less sore today\"  MR Carpio is a non-diabetic 73y WM who presents with infected plantar ulcer on L 2nd toe for last few weeks.  L foot was swollen and sore with red-streaking up to ankle prompting his ED visit yesterday.  Had L Great toe removed/amputated at [facility] on 2/11; Wd cultures grew MRSE for which he received 4wks IV Vancomycin finishing in mid-MAR.  Did well since until earlier this month.  No f/s/c.  Also had MSSA/Candidemia in JAN24 for which he received brief course of abx then too at [facility].    Retired submarine           Principal Problem:    Osteomyelitis of second toe of left foot (HCC)  Active Problems:    GEORGETTE (obstructive sleep apnea)    GERD (gastroesophageal reflux disease)    HTN (hypertension)    Gout    Cellulitis    History of aortic valve replacement    Dysphagia    PAF (paroxysmal atrial fibrillation) (HCC)    PAD (peripheral artery disease) ([facility])  Resolved  file    Highest education level: Not on file   Occupational History    Not on file   Tobacco Use    Smoking status: Never    Smokeless tobacco: Never   Vaping Use    Vaping Use: Never used   Substance and Sexual Activity    Alcohol use: Yes     Alcohol/week: 2.0 standard drinks of alcohol     Types: 1 Glasses of wine, 1 Drinks containing 0.5 oz of alcohol per week    Drug use: Never    Sexual activity: Not Currently   Other Topics Concern    Not on file   Social History Narrative    Not on file     Social Determinants of Health     Financial Resource Strain: Not on file   Food Insecurity: No Food Insecurity (2024)    Hunger Vital Sign     Worried About Running Out of Food in the Last Year: Never true     Ran Out of Food in the Last Year: Never true   Transportation Needs: No Transportation Needs (2024)    PRAPARE - Transportation     Lack of Transportation (Medical): No     Lack of Transportation (Non-Medical): No   Physical Activity: Not on file   Stress: Not on file   Social Connections: Moderately Integrated (2024)    Social Connections ([facility])     If for any reason you need help with day-to-day activities such as bathing, preparing meals, shopping, managing finances, etc., do you get the help you need?: Not on file   Intimate Partner Violence: Not on file   Housing Stability: Low Risk  (2024)    Housing Stability Vital Sign     Unable to Pay for Housing in the Last Year: No     Number of Places Lived in the Last Year: 1     Unstable Housing in the Last Year: No       Allergies:  Patient has no known allergies.     Medications:  [facility]     ROS:  Constitutional: negative for chills, fevers, and sweats  Respiratory: negative for cough and dyspnea on exertion  Cardiovascular: negative for chest pain and dyspnea  Gastrointestinal: negative for abdominal pain, diarrhea, nausea, and vomiting  Genitourinary:negative for dysuria     Physical Exam:    Temp (24hrs), Av.6 °F (37 °C), Min:98.2

## 2024-06-19 NOTE — ANESTHESIA PRE PROCEDURE
Department of Anesthesiology  Preprocedure Note       Name:  Dale Carpio Jr.   Age:  73 y.o.  :  1950                                          MRN:  [id number]         Date:  2024      Surgeon: Surgeon(s):  Mejia Michael DPM    Procedure: Procedure(s):  FOOT DEBRIDEMENT INCISION AND DRAINAGE AND AMPUTATION OF SECOND TOE ON THE LEFT FOOT    Medications prior to admission:   Prior to Admission medications    Medication Sig Start Date End Date Taking? Authorizing Provider   IPRATROPIUM BROMIDE IN Inhale 42 mcg into the lungs 3 times daily   Yes Vinicio Toledo MD   aspirin 81 MG EC tablet Take 1 tablet by mouth daily    Vinicio Toledo MD   apixaban (ELIQUIS) 5 MG TABS tablet Take 1 tablet by mouth 2 times daily Indications: valve replacement    Vinicio Toledo MD   cholestyramine (QUESTRAN) 4 g packet Take 1 packet by mouth 2 times daily Indications: fiber  Patient not taking: Reported on 2024    Vinicio Toledo MD   acidophilus (LACTINEX/FLORANEX) 1 packet by Per J Tube route as needed (when pt has diarrhea)    Vinicio Toledo MD   Multiple Vitamin (MULTIVITAMIN) capsule Take 1 capsule by mouth daily  Patient not taking: Reported on 2024 9/15/14   Vinicio Toledo MD   omeprazole (PRILOSEC) 20 MG delayed release capsule Take 6 mg by mouth Daily Indications: GERD 1/10/23   Vinicio Toledo MD   predniSONE (DELTASONE) 5 MG tablet Take 2 tablets by mouth daily Indications: RA 21   Vinicio Toledo MD   sertraline (ZOLOFT) 100 MG tablet Take 1 tablet by mouth nightly Indications: anxiety 1/10/23   Vinicio Toledo MD   FOLIC ACID PO Take 1 mg by mouth daily  Patient not taking: Reported on 2024    Automatic Reconciliation, Ar   acetaminophen (TYLENOL) 325 MG tablet Take 3 tablets by mouth every 4 hours as needed for Pain Pt takes \"when he does a feeding\" 22   Automatic Reconciliation, Ar   allopurinol (ZYLOPRIM) 100 MG tablet

## 2024-06-20 LAB
ANION GAP SERPL CALC-SCNC: 7 MMOL/L (ref 3–18)
BUN SERPL-MCNC: 25 MG/DL (ref 7–18)
BUN/CREAT SERPL: 28 (ref 12–20)
CALCIUM SERPL-MCNC: 8.4 MG/DL (ref 8.5–10.1)
CHLORIDE SERPL-SCNC: 110 MMOL/L (ref 100–111)
CO2 SERPL-SCNC: 25 MMOL/L (ref 21–32)
CREAT SERPL-MCNC: 0.88 MG/DL (ref 0.6–1.3)
GLUCOSE BLD STRIP.AUTO-MCNC: 127 MG/DL (ref 70–110)
GLUCOSE BLD STRIP.AUTO-MCNC: 148 MG/DL (ref 70–110)
GLUCOSE BLD STRIP.AUTO-MCNC: 81 MG/DL (ref 70–110)
GLUCOSE BLD STRIP.AUTO-MCNC: 97 MG/DL (ref 70–110)
GLUCOSE SERPL-MCNC: 126 MG/DL (ref 74–99)
POTASSIUM SERPL-SCNC: 3.9 MMOL/L (ref 3.5–5.5)
SODIUM SERPL-SCNC: 142 MMOL/L (ref 136–145)

## 2024-06-20 PROCEDURE — 2580000003 HC RX 258: Performed by: HOSPITALIST

## 2024-06-20 PROCEDURE — 82962 GLUCOSE BLOOD TEST: CPT

## 2024-06-20 PROCEDURE — 80048 BASIC METABOLIC PNL TOTAL CA: CPT

## 2024-06-20 PROCEDURE — 6370000000 HC RX 637 (ALT 250 FOR IP): Performed by: HOSPITALIST

## 2024-06-20 PROCEDURE — 97165 OT EVAL LOW COMPLEX 30 MIN: CPT

## 2024-06-20 PROCEDURE — 97116 GAIT TRAINING THERAPY: CPT

## 2024-06-20 PROCEDURE — 97161 PT EVAL LOW COMPLEX 20 MIN: CPT

## 2024-06-20 PROCEDURE — 6360000002 HC RX W HCPCS: Performed by: HOSPITALIST

## 2024-06-20 PROCEDURE — 36415 COLL VENOUS BLD VENIPUNCTURE: CPT

## 2024-06-20 PROCEDURE — 2580000003 HC RX 258: Performed by: PODIATRIST

## 2024-06-20 PROCEDURE — 1100000000 HC RM PRIVATE

## 2024-06-20 RX ADMIN — PIPERACILLIN AND TAZOBACTAM 3375 MG: 3; .375 INJECTION, POWDER, LYOPHILIZED, FOR SOLUTION INTRAVENOUS at 22:56

## 2024-06-20 RX ADMIN — PIPERACILLIN AND TAZOBACTAM 3375 MG: 3; .375 INJECTION, POWDER, LYOPHILIZED, FOR SOLUTION INTRAVENOUS at 11:06

## 2024-06-20 RX ADMIN — SODIUM CHLORIDE, PRESERVATIVE FREE 10 ML: 5 INJECTION INTRAVENOUS at 21:49

## 2024-06-20 RX ADMIN — MONTELUKAST 10 MG: 10 TABLET, FILM COATED ORAL at 08:55

## 2024-06-20 RX ADMIN — SODIUM CHLORIDE, PRESERVATIVE FREE 10 ML: 5 INJECTION INTRAVENOUS at 08:53

## 2024-06-20 RX ADMIN — PREDNISONE 10 MG: 10 TABLET ORAL at 08:52

## 2024-06-20 RX ADMIN — AMLODIPINE BESYLATE 10 MG: 5 TABLET ORAL at 08:52

## 2024-06-20 RX ADMIN — TAMSULOSIN HYDROCHLORIDE 0.4 MG: 0.4 CAPSULE ORAL at 08:52

## 2024-06-20 RX ADMIN — SODIUM CHLORIDE, POTASSIUM CHLORIDE, SODIUM LACTATE AND CALCIUM CHLORIDE: 600; 310; 30; 20 INJECTION, SOLUTION INTRAVENOUS at 22:58

## 2024-06-20 RX ADMIN — ACETAMINOPHEN 650 MG: 325 TABLET ORAL at 13:13

## 2024-06-20 RX ADMIN — ACETAMINOPHEN 650 MG: 325 TABLET ORAL at 23:46

## 2024-06-20 RX ADMIN — FOLIC ACID 1 MG: 1 TABLET ORAL at 08:52

## 2024-06-20 RX ADMIN — ACETAMINOPHEN 650 MG: 325 TABLET ORAL at 05:33

## 2024-06-20 RX ADMIN — ATORVASTATIN CALCIUM 80 MG: 20 TABLET, FILM COATED ORAL at 08:51

## 2024-06-20 RX ADMIN — PIPERACILLIN AND TAZOBACTAM 3375 MG: 3; .375 INJECTION, POWDER, LYOPHILIZED, FOR SOLUTION INTRAVENOUS at 05:28

## 2024-06-20 RX ADMIN — ALLOPURINOL 300 MG: 300 TABLET ORAL at 15:28

## 2024-06-20 RX ADMIN — VANCOMYCIN HYDROCHLORIDE 1500 MG: 10 INJECTION, POWDER, LYOPHILIZED, FOR SOLUTION INTRAVENOUS at 23:40

## 2024-06-20 RX ADMIN — METOPROLOL TARTRATE 25 MG: 25 TABLET, FILM COATED ORAL at 08:52

## 2024-06-20 RX ADMIN — LOSARTAN POTASSIUM 25 MG: 25 TABLET, FILM COATED ORAL at 08:52

## 2024-06-20 RX ADMIN — PIPERACILLIN AND TAZOBACTAM 3375 MG: 3; .375 INJECTION, POWDER, LYOPHILIZED, FOR SOLUTION INTRAVENOUS at 16:43

## 2024-06-20 RX ADMIN — SERTRALINE 100 MG: 50 TABLET, FILM COATED ORAL at 20:34

## 2024-06-20 RX ADMIN — SODIUM CHLORIDE, POTASSIUM CHLORIDE, SODIUM LACTATE AND CALCIUM CHLORIDE: 600; 310; 30; 20 INJECTION, SOLUTION INTRAVENOUS at 13:13

## 2024-06-20 ASSESSMENT — PAIN DESCRIPTION - DESCRIPTORS
DESCRIPTORS: NAGGING
DESCRIPTORS: ACHING

## 2024-06-20 ASSESSMENT — PAIN SCALES - GENERAL
PAINLEVEL_OUTOF10: 5
PAINLEVEL_OUTOF10: 4
PAINLEVEL_OUTOF10: 3

## 2024-06-20 ASSESSMENT — PAIN DESCRIPTION - LOCATION
LOCATION: FOOT
LOCATION: FOOT

## 2024-06-20 ASSESSMENT — PAIN - FUNCTIONAL ASSESSMENT: PAIN_FUNCTIONAL_ASSESSMENT: ACTIVITIES ARE NOT PREVENTED

## 2024-06-20 ASSESSMENT — PAIN DESCRIPTION - ORIENTATION
ORIENTATION: LEFT
ORIENTATION: LEFT

## 2024-06-20 ASSESSMENT — PAIN DESCRIPTION - PAIN TYPE: TYPE: SURGICAL PAIN

## 2024-06-20 NOTE — OP NOTE
Operative Note      Patient: Dale Carpio Jr.  YOB: 1950  MRN: [id number]    Date of Procedure: 6/19/2024    Preoperative diagnosis: Grade 3 Shepard ulcer distal left second toe.  Osteomyelitis distal phalanx left second toe.  Cellulitis left second toe     Postoperative diagnosis: Same as above.     Procedures performed: Partial amputation left second toe at the DIP joint level.  Incision and drainage left second toe complicated.  Bone biopsy left second toe middle phalanx to rule out further osteomyelitis    Surgeon(s):  Mejia Michael DPM    Assistant:   Surgical Assistant: Kings Bobo    Anesthesia: General    Estimated Blood Loss (mL): Minimal    Complications: None    Specimens:   ID Type Source Tests Collected by Time Destination   1 : LEFT SECOND TWO WOUND Swab Foot CULTURE, ANAEROBIC, GRAM STAIN, CULTURE, WOUND Mejia Michael DPM 6/19/2024 1845    A : LEFT SECOND TOE DISTAL PHALANX Tissue Foot SURGICAL PATHOLOGY Mejia Michael DPM 6/19/2024 1851    B : BONE BIOPSY LEFT SECOND TOE MIDDLE PHALANX Tissue Foot SURGICAL PATHOLOGY Mejia Michael DPM 6/19/2024 1851        Implants:  * No implants in log *      Drains:   Gastrostomy/Enterostomy/Jejunostomy Tube Percutaneous Endoscopic Gastrostomy (PEG) (Active)       Findings:  Infection Present At Time Of Surgery (PATOS) (choose all levels that have infection present):  - Deep Infection (muscle/fascia) present as evidenced by abscess, pus, purulent fluid, and fluid consistent with infection  Other Findings: Gray necrotic soft distal phalanx of the left second toe secondary to osteomyelitis    Detailed Description of Procedure:   Patient was brought to the operating room and placed on the operating table in supine position.  Patient's left second toe was anesthetized using 3 mL of 0.5% Marcaine plain.  Patient's left foot was then prepped and draped in usual sterile manner.  No tourniquet was used in this procedure SCDs were used on both

## 2024-06-20 NOTE — PROGRESS NOTES
Hospitalist Progress Note    Patient: Dale Carpio Jr. MRN: [id number]  [facility]: [id number]    YOB: 1950  Age: 73 y.o.  Sex: male    DOA: 6/18/2024 LOS:  LOS: 2 days                Assessment/Plan     Active Hospital Problems    Diagnosis     Acute hematogenous osteomyelitis of left foot (HCC) [M86.072]     Ulcer of toe of left foot, with necrosis of bone (HCC) [L97.524]     Cellulitis of second toe, left [L03.032]     Osteomyelitis of second toe of left foot (HCC) [M86.9]     Dysphagia [R13.10]     PAF (paroxysmal atrial fibrillation) (HCC) [I48.0]     PAD (peripheral artery disease) ([facility]) [I73.9]     HTN (hypertension) [I10]     Cellulitis [L03.90]     GEORGETTE (obstructive sleep apnea) [G47.33]     GERD (gastroesophageal reflux disease) [K21.9]     Gout [M10.9]     History of aortic valve replacement [Z95.2]         Chief complaint :  Left second toe pain and swelling    Left second toe osteomyelitis/cellulitis-  vancomycin and Zosyn on hold per ID  X-ray of left foot showed soft tissue swelling of second toe with findings consistent with osteomyelitis of the tuft of the distal phalanx.  Podiatry consulted by ER  Surgical intervention per podiatry today  ID following.     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     Gout-  Continue with allopurinol     History of aortic valve replacement    Disposition : TBD    Review of systems  General: No fevers or chills.  Cardiovascular: No chest pain or pressure. No palpitations.   Pulmonary: No shortness of breath.   Gastrointestinal: No nausea, vomiting.     Physical Exam:  General: Awake, cooperative, no acute distress    HEENT: NC, Atraumatic.  PERRLA, anicteric

## 2024-06-20 NOTE — PROGRESS NOTES
Physical Therapy Goals:  Initiated 6/20/2024   Short Term Goals  Time Frame for Short Term Goals: 1-7 days  Short Term Goal 1: Pt will be able to transfer supine<>sit independent to improve functional mobility.  Short Term Goal 2: Pt will be able to transfer sit<>stand S to improve independence.  Short Term Goal 3: Pt will be able to ambulate >100' w/ RW, L PWB LE w/ post op surgical shoe, SBA to improve ability to negotiate environment.  Short Term Goal 4: Pt will be able to negotiate >6 steps w/ UE support/rail CGA to gain home entry.      PHYSICAL THERAPY EVALUATION    Patient: Dale Carpio Jr. (73 y.o. male)  Date: 6/20/2024  Primary Diagnosis: Osteomyelitis of toe (HCC) [M86.9]  Osteomyelitis of second toe of left foot (HCC) [M86.9]  Procedure(s) (LRB):  FOOT DEBRIDEMENT INCISION AND DRAINAGE AND AMPUTATION OF SECOND TOE ON THE LEFT FOOT (Left) 1 Day Post-Op   Precautions: Surgical Protocols, Weight Bearing, Fall Risk (use of post op surgical shoe L),  , Left Lower Extremity Weight Bearing: Partial Weight Bearing (with surgical shoe),  ,  ,  ,  ,    PLOF:  Uses RW/rollator for community and able to ambulate in home w/o AD; has SO that is able to assist w/ his needs; Participating in outpatient PT for balance  ASSESSMENT :  Based on objective findings as described below pt presents w/ dec functional mobility and independence in regards to bed mobility, transfers, gait quality/tolerance, balance, stair negotiation and safety.  Generalized weakness, pain in L foot, balance impairment (standing/walking) also impacting functional mobility.  Pt supine in bed upon arrival, eager to participate w/ PT.  Pt reports pain in L foot (sx site), rating pain 3/10 using numerical pain scale.  Pt ed on importance of mobilization, PWB w/ post op shoe and need to use call bell for activity to bathroom.  Pt reports he has been using RW and NS for bathroom.  Rolling independent.  Transfers supine<>sit S.  Donned L post op surgical  native artery\" Layne Davis NP    Awareness under anesthesia     during wrist surgery, heard them talking    BPH (benign prostatic hypertrophy)     Dr. De Los Santos    Depression     Esophageal dysphagia     has feeding tube- Nyla Anders NP/Dr. Zhou- cause unknown    GERD (gastroesophageal reflux disease)     Gout     HTN (hypertension)     Laryngopharyngeal reflux (LPR)     Nyla Anders NP/ Dr. Zhou    PAD (peripheral artery disease) ([facility])     Dr. Reyngaa    PAF (paroxysmal atrial fibrillation) ([facility])     RA (rheumatoid arthritis) ([facility])     Dr. Pickens    Renal calculi     Dr. De Los Santos    Sleep apnea     c pap, Dr. Lakhani    Wears glasses      Past Surgical History:   Procedure Laterality Date    AMPUTATION Left 02/11/2024    great toe    AORTIC VALVE REPLACEMENT  2016    COLONOSCOPY      CYSTOSCOPY  11/02/2023 1-    CYSTOSCOPY N/A 3/20/2024    CYSTOSCOPY, BILATERAL URETEROSCOPY, THULIUM LASER LITHOTRIPSY, BILATERAL STENT REMOVAL WITH C-ARM performed by Abdon De Los Santos MD at [facility] MAIN OR    ESOPHAGOGASTRODUODENOSCOPY      X 4 most recent 11-    FOOT DEBRIDEMENT Left 6/19/2024    FOOT DEBRIDEMENT INCISION AND DRAINAGE AND AMPUTATION OF SECOND TOE ON THE LEFT FOOT performed by Mejia Michael DPM at [facility] MAIN OR    HERNIA REPAIR  06/11/2021    [facility]    IR GASTROSTOMY TUBE PLACEMENT PERCUTANEOUS  04/22/2022    LITHOTRIPSY  01/17/2017    [facility]: ESWL- 7 mm x 10 mm Right UPJ / Renal Pelvis stone, 2500 shocks max intensity level 5 Dr. Heredia    OTHER SURGICAL HISTORY  02/07/2024    Abdominal Aortogram    OTHER SURGICAL HISTORY  02/2024    PICC Line    SEPTOPLASTY  2018    TOTAL KNEE ARTHROPLASTY Left 2003    WRIST SURGERY Bilateral        Home Situation:  Social/Functional History  Lives With: Significant other (Simultaneous filing. User may not have seen previous data.)  Type of Home: House (Simultaneous filing. User may not have seen previous data.)  Home Layout: Two level (Simultaneous filing. User may

## 2024-06-20 NOTE — PROGRESS NOTES
Occupational Therapy Goals:  Initiated 6/20/2024 to be met within 7-10 days.  Short Term Goals  Time Frame for Short Term Goals: 7 days  Short Term Goal 1: Patient will complete toilet transfer with mod I  Short Term Goal 2: Patient will complete bathing with mod I  Short Term Goal 3: Patient will complete grooming at sink with mod I  Short Term Goal 4: Patient will complete dressing with mod I  Short Term Goal 5: Patient will complete toileting with mod I    OCCUPATIONAL THERAPY EVALUATION    Patient: Dale Carpio Jr. (73 y.o. male)  Date: 6/20/2024  Primary Diagnosis: Osteomyelitis of toe (HCC) [M86.9]  Osteomyelitis of second toe of left foot (HCC) [M86.9]  Procedure(s) (LRB):  FOOT DEBRIDEMENT INCISION AND DRAINAGE AND AMPUTATION OF SECOND TOE ON THE LEFT FOOT (Left) 1 Day Post-Op   Precautions: Surgical Protocols, Weight Bearing, Fall Risk (use of post op surgical shoe L),  , Left Lower Extremity Weight Bearing: Partial Weight Bearing (with surgical shoe),  ,  ,  ,  ,    PLOF: Patient completed ADLs independently     ASSESSMENT :  Patient presented supine in bed with gripper socks and IV line. Patient with no visitors present for entire session. Patient completed assessment of ADLs and BUE strength, ROM (see details below). Patient completed lower body dressing minimum assist to don surgical shoe and setup assist to don R tennis shoe in sitting. Patient demonstrated ability to complete functional mobility with RW and postop shoe to bathroom and back. Patient educated on adaptive bathing strategies, verbalized understanding. Due to deficits (listed below) patient has decreased independence with ADLs, IADLs, and functional mobility and would benefit from home health occupational therapy or outpatient OT  ,      DEFICITS/IMPAIRMENTS:  Performance deficits / Impairments: Decreased high-level IADLs;Decreased functional mobility ;Decreased balance;Decreased fine motor control;Decreased coordination    Patient will

## 2024-06-20 NOTE — PROGRESS NOTES
2020    TRANSFER - IN REPORT:    Bedside and Verbal report received from KAREN Forbes (name) on Dale Carpio Jr.  being received from PACU (unit) for routine progression of patient care      Report consisted of patient’s Situation, Background, Assessment and   Recommendations(SBAR).     Information from the following report(s) Nurse Handoff Report, Adult Overview, Surgery Report, and Recent Results was reviewed with the receiving nurse.    Opportunity for questions and clarification was provided.      Assessment completed upon patient’s arrival to unit and care assumed.    2230    Patient self-administered PEG feeding.     2330    Assisted to the BR with a walker with partial weight bearing on left foot. Patient had a BM; perineal care and diaper change done with minimal assist.     0722    Bedside and Verbal shift change report given to KAREN Solorio (oncoming nurse) by KAREN Corral (offgoing nurse). Report included the following information Nurse Handoff Report, Adult Overview, Surgery Report, Intake/Output, MAR, and Recent Results.      Diagnostic testing not indicated for today's encounter

## 2024-06-20 NOTE — PROGRESS NOTES
[facility]   Pharmacy Pharmacokinetic Monitoring Service - Vancomycin    Consulting Provider: Dr. Mark Lujan   Indication: Bone and Joint Infection  Target Concentration: Goal AUC/SAMM 400-600 mg*hr/L  Day of Therapy: 2  Additional Antimicrobials: Zosyn 3.375 mg q6h    Pertinent Laboratory Values:   Wt Readings from Last 1 Encounters:   06/19/24 59 kg (130 lb)     Temp Readings from Last 1 Encounters:   06/20/24 98.2 °F (36.8 °C) (Oral)     Estimated Creatinine Clearance: 63 mL/min (based on SCr of 0.88 mg/dL).  Recent Labs     06/18/24  1000 06/19/24  0308 06/20/24  0317   CREATININE 0.93 0.90 0.88   BUN 26* 24* 25*   WBC 9.4  --   --      MRSA Nasal Swab: N/A. Non-respiratory infection.    Recent vancomycin administrations                     vancomycin (VANCOCIN) 1500 mg in sodium chloride 0.9% 500 mL IVPB (mg) 1,500 mg New Bag 06/19/24 2350    vancomycin (VANCOCIN) 1500 mg in sodium chloride 0.9% 500 mL IVPB (mg) 1,500 mg New Bag 06/18/24 1709                  Plan:  Continuing Vancomycin 1500 mg q24h  AUC: 512 mg/l.hr and trough: 11.1 mg/L  Repeat vancomycin concentration ordered for 6/21 @ 0600   Pharmacy will continue to monitor patient and adjust therapy as indicated    Temi Da Silva RPH, PharmD  6/20/2024 10:52 AM

## 2024-06-20 NOTE — PLAN OF CARE
Problem: Pain  Goal: Verbalizes/displays adequate comfort level or baseline comfort level  6/20/2024 0145 by Shayna Lugo RN  Outcome: Progressing     Problem: Safety - Adult  Goal: Free from fall injury  6/20/2024 1143 by Lyndsey Tadeo RN  Outcome: Progressing  6/20/2024 0145 by Shayna Lugo RN  Outcome: Progressing     Problem: Nutrition Deficit:  Goal: Optimize nutritional status  6/20/2024 0145 by Shayna Lugo RN  Outcome: Progressing     Problem: Skin/Tissue Integrity  Goal: Absence of new skin breakdown  Description: 1.  Monitor for areas of redness and/or skin breakdown  2.  Assess vascular access sites hourly  3.  Every 4-6 hours minimum:  Change oxygen saturation probe site  4.  Every 4-6 hours:  If on nasal continuous positive airway pressure, respiratory therapy assess nares and determine need for appliance change or resting period.  6/20/2024 1143 by Lyndsey Tadeo RN  Outcome: Progressing  6/20/2024 0145 by Shayna Lugo RN  Outcome: Progressing     Problem: Chronic Conditions and Co-morbidities  Goal: Patient's chronic conditions and co-morbidity symptoms are monitored and maintained or improved  6/20/2024 0145 by Shayna Lugo RN  Outcome: Progressing

## 2024-06-20 NOTE — PROGRESS NOTES
Noted increased redness, foul odor and drainage from peg site.  Wound consult placed, dressing changed.  MD paged for update and medication recommendations.    @ 9810, MD return call, updated on area of concern.  New medication order received, RN to implement

## 2024-06-20 NOTE — PERIOP NOTE
TRANSFER - OUT REPORT:    Verbal report given to KAREN Arreguin on Dale Carpio Jr.  being transferred to [address] [place] [address] for routine progression of patient care       Report consisted of patient's Situation, Background, Assessment and   Recommendations(SBAR).     Information from the following report(s) Adult Overview, Surgery Report, Intake/Output, and MAR was reviewed with the receiving nurse.           Lines:   Peripheral IV 06/18/24 Right Antecubital (Active)   Site Assessment Clean, dry & intact 06/19/24 1936   Line Status Infusing 06/19/24 1936   Line Care Connections checked and tightened 06/19/24 1936   Phlebitis Assessment No symptoms 06/19/24 1936   Infiltration Assessment 0 06/19/24 1936   Alcohol Cap Used Yes 06/19/24 1936   Dressing Status Clean, dry & intact 06/19/24 1936   Dressing Type Transparent 06/19/24 1936   Dressing Intervention Other (Comment) 06/18/24 1858        Opportunity for questions and clarification was provided.      Patient transported with:  Registered Nurse

## 2024-06-20 NOTE — CARE COORDINATION
D/C Plan: Home with HH for wound care and possible IV ABX vs acute rehab depending upon clinical progression and therapy recommendations    CM met with patient at bedside with daughter in room. Per patient he is independent at home and lives with his s.o. The patient has experience with IV ABX at home and his s.o. also has experience administering IV ABX to the patient via PICC line. The patient would like to have referrals sent to [facility] and stated his first preference for HH provider is [facility]. He would like a referral to [facility] if [facility] can not accept him as a patient. The patient uses a CPAP supplied by [facility] (formerly [facility] [employer] [facility]) and he has a cane, RW and rollator at home. He uses the rollator for long distances and reports in the home he does not use these DME.    06/19/24 0930   Service Assessment   Patient Orientation Alert and Oriented   Cognition Alert   History Provided By Patient   Primary Caregiver Self   Support Systems Spouse/Significant Other   Patient's Healthcare Decision Maker is: Legal Next of Kin   PCP Verified by CM Yes   Last Visit to PCP Within last 3 months   Financial Resources Medicare   Community Resources None   CM/SW Referral Other (see comment)  (discharge planning)   Social/Functional History   Lives With Significant other   Type of Home House   Home Layout Two level   Home Access Stairs to enter without rails   Entrance Stairs - Number of Steps 7 steps   Bathroom Shower/Tub Tub/Shower unit   Home Equipment Cane;Rollator;Walker - Rolling  (Patient states he only uses the rollater for long distances but no DME for ambulating in the house, CPAP is from [facility])   Receives Help From Family   ADL Assistance Independent   Homemaking Assistance Independent   Ambulation Assistance Independent   Transfer Assistance Independent   Active  Yes   Mode of Transportation Car   Occupation Retired   Discharge Planning   Type of Residence House   Living Arrangements

## 2024-06-20 NOTE — PLAN OF CARE
Problem: Pain  Goal: Verbalizes/displays adequate comfort level or baseline comfort level  Outcome: Progressing     Problem: Safety - Adult  Goal: Free from fall injury  6/20/2024 0145 by Shayna Lugo RN  Outcome: Progressing  6/19/2024 1146 by Lyndsey Tadeo RN  Outcome: Progressing     Problem: Nutrition Deficit:  Goal: Optimize nutritional status  Outcome: Progressing     Problem: Skin/Tissue Integrity  Goal: Absence of new skin breakdown  Description: 1.  Monitor for areas of redness and/or skin breakdown  2.  Assess vascular access sites hourly  3.  Every 4-6 hours minimum:  Change oxygen saturation probe site  4.  Every 4-6 hours:  If on nasal continuous positive airway pressure, respiratory therapy assess nares and determine need for appliance change or resting period.  6/20/2024 0145 by Shayna Lugo RN  Outcome: Progressing  6/19/2024 1146 by Lyndsey Tadeo RN  Outcome: Progressing     Problem: Chronic Conditions and Co-morbidities  Goal: Patient's chronic conditions and co-morbidity symptoms are monitored and maintained or improved  Outcome: Progressing

## 2024-06-20 NOTE — PROGRESS NOTES
CM rounded with nursing and therapy in discharge huddle and discussed need to clarrify when and if therapy orders are appropriate. 3S charge nurse reported plan to reach out to the surgeon to clarify when the patient should begin to work with therapy and place therapy orders as appropriate.

## 2024-06-21 LAB
ANION GAP SERPL CALC-SCNC: 5 MMOL/L (ref 3–18)
BUN SERPL-MCNC: 23 MG/DL (ref 7–18)
BUN/CREAT SERPL: 26 (ref 12–20)
CALCIUM SERPL-MCNC: 8.5 MG/DL (ref 8.5–10.1)
CHLORIDE SERPL-SCNC: 113 MMOL/L (ref 100–111)
CO2 SERPL-SCNC: 23 MMOL/L (ref 21–32)
CREAT SERPL-MCNC: 0.9 MG/DL (ref 0.6–1.3)
GLUCOSE BLD STRIP.AUTO-MCNC: 106 MG/DL (ref 70–110)
GLUCOSE BLD STRIP.AUTO-MCNC: 108 MG/DL (ref 70–110)
GLUCOSE BLD STRIP.AUTO-MCNC: 73 MG/DL (ref 70–110)
GLUCOSE SERPL-MCNC: 82 MG/DL (ref 74–99)
POTASSIUM SERPL-SCNC: 3.8 MMOL/L (ref 3.5–5.5)
SODIUM SERPL-SCNC: 141 MMOL/L (ref 136–145)
VANCOMYCIN SERPL-MCNC: 41.4 UG/ML (ref 5–40)

## 2024-06-21 PROCEDURE — 2580000003 HC RX 258: Performed by: HOSPITALIST

## 2024-06-21 PROCEDURE — 6370000000 HC RX 637 (ALT 250 FOR IP): Performed by: HOSPITALIST

## 2024-06-21 PROCEDURE — 97116 GAIT TRAINING THERAPY: CPT

## 2024-06-21 PROCEDURE — 2500000003 HC RX 250 WO HCPCS: Performed by: HOSPITALIST

## 2024-06-21 PROCEDURE — 6360000002 HC RX W HCPCS: Performed by: HOSPITALIST

## 2024-06-21 PROCEDURE — 6360000002 HC RX W HCPCS: Performed by: INTERNAL MEDICINE

## 2024-06-21 PROCEDURE — 80202 ASSAY OF VANCOMYCIN: CPT

## 2024-06-21 PROCEDURE — 2580000003 HC RX 258: Performed by: INTERNAL MEDICINE

## 2024-06-21 PROCEDURE — 36415 COLL VENOUS BLD VENIPUNCTURE: CPT

## 2024-06-21 PROCEDURE — 80048 BASIC METABOLIC PNL TOTAL CA: CPT

## 2024-06-21 PROCEDURE — 82962 GLUCOSE BLOOD TEST: CPT

## 2024-06-21 PROCEDURE — 1100000000 HC RM PRIVATE

## 2024-06-21 RX ADMIN — PIPERACILLIN AND TAZOBACTAM 3375 MG: 3; .375 INJECTION, POWDER, LYOPHILIZED, FOR SOLUTION INTRAVENOUS at 04:56

## 2024-06-21 RX ADMIN — LOSARTAN POTASSIUM 25 MG: 25 TABLET, FILM COATED ORAL at 10:55

## 2024-06-21 RX ADMIN — TAMSULOSIN HYDROCHLORIDE 0.4 MG: 0.4 CAPSULE ORAL at 10:55

## 2024-06-21 RX ADMIN — SODIUM CHLORIDE, PRESERVATIVE FREE 10 ML: 5 INJECTION INTRAVENOUS at 10:57

## 2024-06-21 RX ADMIN — ACETAMINOPHEN 650 MG: 325 TABLET ORAL at 11:35

## 2024-06-21 RX ADMIN — MICONAZOLE NITRATE: 2 POWDER TOPICAL at 10:40

## 2024-06-21 RX ADMIN — SERTRALINE 100 MG: 50 TABLET, FILM COATED ORAL at 22:01

## 2024-06-21 RX ADMIN — AMLODIPINE BESYLATE 10 MG: 5 TABLET ORAL at 10:53

## 2024-06-21 RX ADMIN — PREDNISONE 10 MG: 10 TABLET ORAL at 10:55

## 2024-06-21 RX ADMIN — METOPROLOL TARTRATE 25 MG: 25 TABLET, FILM COATED ORAL at 22:01

## 2024-06-21 RX ADMIN — METOPROLOL TARTRATE 25 MG: 25 TABLET, FILM COATED ORAL at 10:55

## 2024-06-21 RX ADMIN — ATORVASTATIN CALCIUM 80 MG: 20 TABLET, FILM COATED ORAL at 10:53

## 2024-06-21 RX ADMIN — WATER 2000 MG: 1 INJECTION INTRAMUSCULAR; INTRAVENOUS; SUBCUTANEOUS at 10:56

## 2024-06-21 RX ADMIN — ALLOPURINOL 300 MG: 300 TABLET ORAL at 15:17

## 2024-06-21 RX ADMIN — FOLIC ACID 1 MG: 1 TABLET ORAL at 10:55

## 2024-06-21 RX ADMIN — SODIUM CHLORIDE, PRESERVATIVE FREE 10 ML: 5 INJECTION INTRAVENOUS at 22:06

## 2024-06-21 ASSESSMENT — PAIN SCALES - GENERAL
PAINLEVEL_OUTOF10: 0
PAINLEVEL_OUTOF10: 3

## 2024-06-21 NOTE — PROGRESS NOTES
conducted an initial consultation and spiritual assessment for Dale Carpio Jr., who is a 73 y.o.,male.     A very pleasant patient who is satisfied with the care he is receiving.    Initiated a relationship of care and support.   Explored issues of [place], belief, spirituality and Presybeterian/ritual needs.  Listened empathically.  Provided information about Spiritual Care Services.   confirmed Patient's Scientology Affiliation.  Patient processed feelings about current hospitalization.  Offered prayer and assurance of continued prayers on patients behalf.   Chart reviewed.  Patient expressed gratitude for Spiritual Care visit.    Chaplains will continue to follow and will provide pastoral care as needed or requested.    recommends bedside caregivers page  on duty if patient shows signs of acute spiritual or emotional distress.    Sister Marcelina Garcia MA, [facility]     Spiritual Care  [phone]

## 2024-06-21 NOTE — PROGRESS NOTES
Hospitalist Progress Note    Patient: Dale Carpio Jr. MRN: [id number]  [facility]: [id number]    YOB: 1950  Age: 73 y.o.  Sex: male    DOA: 6/18/2024 LOS:  LOS: 3 days                Assessment/Plan     Active Hospital Problems    Diagnosis     Acute hematogenous osteomyelitis of left foot (HCC) [M86.072]     Ulcer of toe of left foot, with necrosis of bone (HCC) [L97.524]     Cellulitis of second toe, left [L03.032]     Osteomyelitis of second toe of left foot (HCC) [M86.9]     Dysphagia [R13.10]     PAF (paroxysmal atrial fibrillation) ([facility]) [I48.0]     PAD (peripheral artery disease) ([facility]) [I73.9]     HTN (hypertension) [I10]     Cellulitis [L03.90]     GEORGETTE (obstructive sleep apnea) [G47.33]     GERD (gastroesophageal reflux disease) [K21.9]     Gout [M10.9]     History of aortic valve replacement [Z95.2]         Chief complaint :  Left second toe pain and swelling    Plan was to discharge him today, no antibiotics recommended per Dr. Jones, likely surgical cure. He spiked fever, will monitor overnight and follow up with intra op cultures.     Left second toe osteomyelitis/cellulitis-  vancomycin and Zosyn on hold per ID  X-ray of left foot showed soft tissue swelling of second toe with findings consistent with osteomyelitis of the tuft of the distal phalanx.  Podiatry consulted by ER  S/p Partial amputation left second toe at the DIP joint level. Incision and drainage left second toe complicated. Bone biopsy left second toe middle phalanx to rule out further osteomyelitis.  Follow intra op cultures.   ID following. 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      Gout-  Continue with allopurinol     History of aortic valve replacement    Disposition : TBD    Review of systems  General: No fevers or chills.  Cardiovascular: No chest pain or pressure. No palpitations.   Pulmonary: No shortness of breath.   Gastrointestinal: No nausea, vomiting.     Physical Exam:  General: Awake, cooperative, no acute distress    HEENT: NC, Atraumatic.  PERRLA, anicteric sclerae.  Lungs: CTA Bilaterally. No Wheezing/Rhonchi/Rales.  Heart:  S1 S2,  No murmur, No Rubs, No Gallops  Abdomen: Soft, Non distended, Non tender.  +Bowel sounds,   Extremities: Left foot with dressing  Psych:   Not anxious or agitated.  Neurologic:  No acute neurological deficit.         Vital signs/Intake and Output:  Visit Vitals  /78   Pulse 86   Temp 97.9 °F (36.6 °C) (Oral)   Resp 17   Ht 1.702 m (5' 7.01\")   Wt 59 kg (130 lb)   SpO2 96%   BMI 20.36 kg/m²     Current Shift:  06/21 0701 - 06/21 1900  In: 350   Out: -   Last three shifts:  06/19 1901 - 06/21 0700  In: 5020 [P.O.:385; I.V.:3085]  Out: 2150 [Urine:2150]            Labs: Results:       Chemistry Recent Labs     06/18/24  1000 06/19/24  0308 06/20/24  0317 06/21/24  0347    139 142 141   K 4.6 4.1 3.9 3.8    108 110 113*   CO2 31 26 25 23   BUN 26* 24* 25* 23*   GLOB 3.8  --   --   --    ,No results found for: \"LACTA\"   CBC w/Diff Recent Labs     06/18/24  1000   WBC 9.4   RBC 3.93*   HGB 11.5*   HCT 37.0         Cardiac Enzymes No results found for: \"CKTOTAL\", \"CKMB\", \"CKMBINDEX\", \"TROPONINI\", \"TROPHS\",No results found for: \"BNP\"   Coagulation No results for input(s): \"INR\", \"APTT\" in the last 72 hours.    Invalid input(s): \"PTP\"    Lipid Panel No results found for: \"CHOL\", \"CHOLPOCT\", \"CHLST\", \"CHOLV\", \"048153\", \"HDL\", \"HDLC\", \"LDL\", \"VLDLC\", \"VLDL\"   Pancreas No results for input(s): \"LIPASE\" in the last 72 hours.,No results for input(s): \"AMYLASE\" in the last 72 hours.   Liver Enzymes No results for input(s): \"TP\" in the

## 2024-06-21 NOTE — CARE COORDINATION
06/21/24 1159   IMM Letter   IMM Letter given to Patient/Family/Significant other/Guardian/POA/by: Tanisha Estrella   IMM Letter date given: 06/21/24   IMM Letter time given: 1150

## 2024-06-21 NOTE — PLAN OF CARE
Problem: Pain  Goal: Verbalizes/displays adequate comfort level or baseline comfort level  Outcome: Progressing     Problem: Safety - Adult  Goal: Free from fall injury  6/20/2024 2209 by Jeffy Sierra RN  Outcome: Progressing  6/20/2024 1143 by Lyndsey Tadeo RN  Outcome: Progressing     Problem: Nutrition Deficit:  Goal: Optimize nutritional status  Outcome: Progressing     Problem: Skin/Tissue Integrity  Goal: Absence of new skin breakdown  Description: 1.  Monitor for areas of redness and/or skin breakdown  2.  Assess vascular access sites hourly  3.  Every 4-6 hours minimum:  Change oxygen saturation probe site  4.  Every 4-6 hours:  If on nasal continuous positive airway pressure, respiratory therapy assess nares and determine need for appliance change or resting period.  6/20/2024 2209 by Jeffy Sierra RN  Outcome: Progressing  6/20/2024 1143 by Lyndsey Tadeo RN  Outcome: Progressing     Problem: Chronic Conditions and Co-morbidities  Goal: Patient's chronic conditions and co-morbidity symptoms are monitored and maintained or improved  Outcome: Progressing

## 2024-06-21 NOTE — PROGRESS NOTES
2000  Patient in bed awake, A/Ox4, speech clear, hearing intact, ambulatory with cane or walker. On room air, lung sounds clear, respirations unlabored. Skin is warm, dry, with PEG tube in place. Dressings clean, dry, and intact. Radial and pedal pulses present bilaterally, capillary refill <3 seconds to fingers and toes. Abdomen soft, bowel sounds active. Strong hand  noted to bilateral upper extremities. Noted weakness to BLE. Side rails x3 up, bed locked and in lowest position, bed alarm on, call bell and bedside table within reach, needs attended, denies any pain, SOB, or concerns at present.    Patient self feeds through PEG tube. Meds crushed and given through PEG.

## 2024-06-21 NOTE — PROGRESS NOTES
[facility]  (A Division of [facility])    Follow-up Note      Dr Wolf will be back tomorrow morning.         Date of Admission: 6/18/2024       Date of Note:  6/21/2024    Summary:  MR Carpio is a non-diabetic 73y WM who presents with infected plantar ulcer on L 2nd toe for last few weeks.  L foot was swollen and sore with red-streaking up to ankle prompting his ED visit yesterday.  Had L Great toe removed/amputated at [facility] on 2/11; Wd cultures grew MRSE for which he received 4wks IV Vancomycin finishing in mid-MAR.  Did well since until earlier this month.  No f/s/c.  Also had MSSA/Candidemia in JAN24 for which he received brief course of abx then too at [facility].     Retired [employer]          CC:  \"My foot is less sore today\"  Today:  Events last few days tracked daily/reviewed today - pt seen again.  Much better.  Pain controlled.  Hungry    Tm <100  No CBC today    Current Antimicrobials:    Prior Antimicrobials:  Pip/tzb IV (6/18-) #3  Vancomycin IV (6/18-) #3        Assessment: Rec / Plan:   L 2nd Toe Ulcer/OM - POD #2  6/19:  CRP 77mg/L  Dr Michael pretty confident that he got it all in this non-diabetic - nothing of consequence has grown out to this date/point.  No MRSA or yeast or even CoNS (as previously at [facility]).  Will stop both these abx and switch to once daily CAX IV until DC home.  No need for PICC or long-term abx or PO abx upon DC ->POD#2    Stop pip/tzb + vancomycin    CAX 2gm IV daily today  Ok to DC at discharge home  I'm good with DC today       HTN     RA     GEORGETTE     Gout         Microbiology:             6/19 - BCx (-)      OR (-)                                      6/18 - BCx (-)                                         2/11/24 - [facility] OR (+) MRSE        Lines / Catheters:      peripheral        Patient Active Problem List   Diagnosis    S/P laparoscopic fundoplication    Chronic allergic rhinitis    GEORGETTE (obstructive sleep

## 2024-06-21 NOTE — PROGRESS NOTES
Bedside and Verbal shift change report given to KAREN Isaac (oncoming nurse) by KAREN Bardales (offgoing nurse). Report included the following information Nurse Handoff Report, Adult Overview, Intake/Output, MAR, and Recent Results.

## 2024-06-21 NOTE — CONSULTS
Patient seen for consult for fungal rash around Peg tube.  Patient has been on IV antibiotics during admission and reports periodic leakage of formula and other liquids.  MD started antifungal powder yesterday and patient changes dressing to PEG on his own.  Wound care nurse recommended that if he is having frequent leaks he should follow up with his gastroenterologist, and increase the frequency of his dressing changes.  Patient voiced understanding.

## 2024-06-21 NOTE — PROGRESS NOTES
D/C Plan: Home with [facility] and IV ABX vs no IV ABX, possible need for RRH pending IV ABX choice follow up with surgeon and in wound care clinic    CM notes cultures remain pending, and ID will review today. [facility] can start care over the weekend should patient discharge with IV ABX. WhatsApp has been consulted.

## 2024-06-21 NOTE — PROGRESS NOTES
D/C Plan: Home with [facility] and follow up with surgeon in office within the [facility] house    CM rounded wth ID. Plan is to remain overnight and continue IV ABX until discharge tomorrow and then discharge on PO ABX if needed. No need for PICC or IV ABX.

## 2024-06-21 NOTE — PROGRESS NOTES
Clinical update sent to [facility], [facility], and [facility].

## 2024-06-21 NOTE — PROGRESS NOTES
Physical Therapy Goals:  Initiated 6/21/2024 to be met within 7-10 days.  Short Term Goals  Time Frame for Short Term Goals: 1-7 days  Short Term Goal 1: Pt will be able to transfer supine<>sit independent to improve functional mobility.  Short Term Goal 2: Pt will be able to transfer sit<>stand S to improve independence.  Short Term Goal 3: Pt will be able to ambulate >100' w/ RW, L PWB LE w/ post op surgical shoe, SBA to improve ability to negotiate environment.  Short Term Goal 4: Pt will be able to negotiate >6 steps w/ UE support/rail CGA to gain home entry.    []  Patient has met MD lidya alvarez for d/c home   []  Recommend HH with 24 hour adult care   [x]  Benefit from additional acute PT session to address:  stair training      PHYSICAL THERAPY TREATMENT    Patient: Dale Carpio Jr. (73 y.o. male)  Date: 6/21/2024  Diagnosis: Osteomyelitis of toe (HCC) [M86.9]  Osteomyelitis of second toe of left foot (HCC) [M86.9] Acute hematogenous osteomyelitis of left foot (HCC)  Procedure(s) (LRB):  FOOT DEBRIDEMENT INCISION AND DRAINAGE AND AMPUTATION OF SECOND TOE ON THE LEFT FOOT (Left) 2 Days Post-Op  Precautions: Surgical Protocols, Weight Bearing, Fall Risk (use of post op surgical shoe L),  , Left Lower Extremity Weight Bearing: Partial Weight Bearing (with surgical shoe),  ,  ,  ,  ,    PLOF: ambulatory with a cane, has a RW    ASSESSMENT:  Assessment  Assessment: Pt supine in bed upon arrival.  No assistance needed to sit up EOB.  Pt donned ortho shoe and sneaker independently.  Gown changed at EOB due to IV leaking.  Elevated bed to height of bed at home and no difficulty performing sit to stand.  Ambulated 230ft with RW, steady reciprocal gt pattern, no LOB or path deviations.  Pt moved to new room during session.  All belongings move to new room.  Updated nurse about IV leaking.  Activity Tolerance: Patient tolerated treatment well  Discharge Recommendations: Home with Home health  to increase the patient's independence.

## 2024-06-21 NOTE — PROGRESS NOTES
Podiatry:    Patient is status post left second toe partial amputation secondary to osteomyelitis.  Patient is healing well.  Patient will follow-up with me in my office after discharge.  Please have case management call and make his appointment.

## 2024-06-21 NOTE — PROGRESS NOTES
Bedside and Verbal shift change report given to KAREN Albrecht (oncoming nurse) by KAREN Isaac (offgoing nurse). Report included the following information Nurse Handoff Report, Adult Overview, Intake/Output, and MAR.

## 2024-06-22 VITALS
HEART RATE: 72 BPM | SYSTOLIC BLOOD PRESSURE: 125 MMHG | DIASTOLIC BLOOD PRESSURE: 78 MMHG | HEIGHT: 67 IN | TEMPERATURE: 98.3 F | WEIGHT: 128 LBS | OXYGEN SATURATION: 100 % | BODY MASS INDEX: 20.09 KG/M2 | RESPIRATION RATE: 16 BRPM

## 2024-06-22 LAB
BACTERIA SPEC CULT: ABNORMAL
BACTERIA SPEC CULT: ABNORMAL
BASOPHILS # BLD: 0 K/UL (ref 0–0.1)
BASOPHILS NFR BLD: 0 % (ref 0–2)
CRP SERPL-MCNC: 4.1 MG/DL (ref 0–0.3)
DIFFERENTIAL METHOD BLD: ABNORMAL
EOSINOPHIL # BLD: 0.3 K/UL (ref 0–0.4)
EOSINOPHIL NFR BLD: 4 % (ref 0–5)
ERYTHROCYTE [DISTWIDTH] IN BLOOD BY AUTOMATED COUNT: 13.8 % (ref 11.6–14.5)
GLUCOSE BLD STRIP.AUTO-MCNC: 133 MG/DL (ref 70–110)
GLUCOSE BLD STRIP.AUTO-MCNC: 77 MG/DL (ref 70–110)
HCT VFR BLD AUTO: 33.1 % (ref 36–48)
HGB BLD-MCNC: 10.3 G/DL (ref 13–16)
IMM GRANULOCYTES # BLD AUTO: 0 K/UL (ref 0–0.04)
IMM GRANULOCYTES NFR BLD AUTO: 0 % (ref 0–0.5)
LYMPHOCYTES # BLD: 1.6 K/UL (ref 0.9–3.6)
LYMPHOCYTES NFR BLD: 17 % (ref 21–52)
MCH RBC QN AUTO: 29.4 PG (ref 24–34)
MCHC RBC AUTO-ENTMCNC: 31.1 G/DL (ref 31–37)
MCV RBC AUTO: 94.6 FL (ref 78–100)
MONOCYTES # BLD: 0.9 K/UL (ref 0.05–1.2)
MONOCYTES NFR BLD: 10 % (ref 3–10)
NEUTS SEG # BLD: 6.4 K/UL (ref 1.8–8)
NEUTS SEG NFR BLD: 69 % (ref 40–73)
NRBC # BLD: 0 K/UL (ref 0–0.01)
NRBC BLD-RTO: 0 PER 100 WBC
PLATELET # BLD AUTO: 285 K/UL (ref 135–420)
PMV BLD AUTO: 10.3 FL (ref 9.2–11.8)
RBC # BLD AUTO: 3.5 M/UL (ref 4.35–5.65)
SERVICE CMNT-IMP: ABNORMAL
WBC # BLD AUTO: 9.3 K/UL (ref 4.6–13.2)

## 2024-06-22 PROCEDURE — 86140 C-REACTIVE PROTEIN: CPT

## 2024-06-22 PROCEDURE — 6370000000 HC RX 637 (ALT 250 FOR IP): Performed by: HOSPITALIST

## 2024-06-22 PROCEDURE — 2580000003 HC RX 258: Performed by: INTERNAL MEDICINE

## 2024-06-22 PROCEDURE — 97116 GAIT TRAINING THERAPY: CPT

## 2024-06-22 PROCEDURE — 6360000002 HC RX W HCPCS: Performed by: INTERNAL MEDICINE

## 2024-06-22 PROCEDURE — 2580000003 HC RX 258: Performed by: HOSPITALIST

## 2024-06-22 PROCEDURE — 85025 COMPLETE CBC W/AUTO DIFF WBC: CPT

## 2024-06-22 PROCEDURE — 36415 COLL VENOUS BLD VENIPUNCTURE: CPT

## 2024-06-22 PROCEDURE — 82962 GLUCOSE BLOOD TEST: CPT

## 2024-06-22 RX ADMIN — MONTELUKAST 10 MG: 10 TABLET, FILM COATED ORAL at 08:30

## 2024-06-22 RX ADMIN — MICONAZOLE NITRATE: 2 POWDER TOPICAL at 06:27

## 2024-06-22 RX ADMIN — LOSARTAN POTASSIUM 25 MG: 25 TABLET, FILM COATED ORAL at 08:13

## 2024-06-22 RX ADMIN — TAMSULOSIN HYDROCHLORIDE 0.4 MG: 0.4 CAPSULE ORAL at 08:13

## 2024-06-22 RX ADMIN — SODIUM CHLORIDE, POTASSIUM CHLORIDE, SODIUM LACTATE AND CALCIUM CHLORIDE: 600; 310; 30; 20 INJECTION, SOLUTION INTRAVENOUS at 08:10

## 2024-06-22 RX ADMIN — MICONAZOLE NITRATE: 2 POWDER TOPICAL at 08:15

## 2024-06-22 RX ADMIN — SODIUM CHLORIDE, PRESERVATIVE FREE 10 ML: 5 INJECTION INTRAVENOUS at 08:13

## 2024-06-22 RX ADMIN — FOLIC ACID 1 MG: 1 TABLET ORAL at 08:13

## 2024-06-22 RX ADMIN — AMLODIPINE BESYLATE 10 MG: 5 TABLET ORAL at 08:13

## 2024-06-22 RX ADMIN — METOPROLOL TARTRATE 25 MG: 25 TABLET, FILM COATED ORAL at 08:13

## 2024-06-22 RX ADMIN — ATORVASTATIN CALCIUM 80 MG: 20 TABLET, FILM COATED ORAL at 08:13

## 2024-06-22 RX ADMIN — PREDNISONE 10 MG: 10 TABLET ORAL at 08:14

## 2024-06-22 RX ADMIN — WATER 2000 MG: 1 INJECTION INTRAMUSCULAR; INTRAVENOUS; SUBCUTANEOUS at 09:43

## 2024-06-22 NOTE — PROGRESS NOTES
Physical Therapy Goals:  Initiated 6/22/2024 to be met within 7-10 days.  Short Term Goals  Time Frame for Short Term Goals: 1-7 days  Short Term Goal 1: Pt will be able to transfer supine<>sit independent to improve functional mobility.  Short Term Goal 2: Pt will be able to transfer sit<>stand S to improve independence.  Short Term Goal 3: Pt will be able to ambulate >100' w/ RW, L PWB LE w/ post op surgical shoe, SBA to improve ability to negotiate environment.  Short Term Goal 4: Pt will be able to negotiate >6 steps w/ UE support/rail CGA to gain home entry.    []  Patient has met MD lidya alvarez for d/c home   []  Recommend HH with 24 hour adult care   []  Benefit from additional acute PT session to address:        PHYSICAL THERAPY TREATMENT    Patient: Dale Carpio Jr. (73 y.o. male)  Date: 6/22/2024  Diagnosis: Osteomyelitis of toe (HCC) [M86.9]  Osteomyelitis of second toe of left foot (HCC) [M86.9] Acute hematogenous osteomyelitis of left foot (HCC)  Procedure(s) (LRB):  FOOT DEBRIDEMENT INCISION AND DRAINAGE AND AMPUTATION OF SECOND TOE ON THE LEFT FOOT (Left) 3 Days Post-Op  Precautions: Surgical Protocols, Weight Bearing, Fall Risk (use of post op surgical shoe L),  , Left Lower Extremity Weight Bearing: Partial Weight Bearing (with surgical shoe),  ,  ,  ,  ,    PLOF: ambulatory with a cane, has a RW    ASSESSMENT:  Assessment  Assessment: Pt in bed upon arrival.  Sat up EOB without assistance.  Donned shoe and ortho shoe independently.  No difficulty with sit to stand.  Ambulated 200ft with RW, step to gt pattern, steady pace, no LOB or path deviations.  Pt maintains PWB on heel.  Returned to room and left sitting EOB.  Pt is safe for d/c home with HHPT.  Activity Tolerance: Patient tolerated treatment well  Discharge Recommendations: Home with Home health PT    Progression toward goals:   [x]      Improving appropriately and progressing toward goals  []      Improving slowly and

## 2024-06-22 NOTE — PROGRESS NOTES
New cylinders and tube feeds set up for patient to use. No complaints overnight. Afebrile overnight and no signs or symptoms of hypo-/hyper- glycemia

## 2024-06-22 NOTE — DISCHARGE SUMMARY
truncation of the first ray tendons. Muscles: Diffuse atrophy and edema signal. Neurovascular bundles: Not well evaluated. Articular cartilage: Degenerative changes. Soft tissues: Diffuse soft tissue edema and swelling, worst at the second toe. Ulceration and gas at the tip of the second toe..     1.  Study degraded by motion. 2.  Ulceration and gas at the tip of the second toe with acute osteomyelitis of the second distal phalanx. 3.  First transmetatarsal amputation. Electronically signed by JOHN MOILNA    CT CHEST W CONTRAST    Result Date: 6/19/2024  EXAM:  CT CHEST W CONTRAST INDICATION:  Follow-up on chest x-ray COMPARISON: Chest radiograph 6/18/2024. CONTRAST: 100 cc of [id number]. TECHNIQUE: Multislice helical CT was performed from the thoracic inlet to the adrenal glands after the uneventful administration of intravenous contrast. Contiguous 5 mm axial images were reconstructed and lung and soft tissue windows were generated. Coronal and sagittal reformations were generated.  CT dose reduction was achieved through use of a standardized protocol tailored for this examination and automatic exposure control for dose modulation. FINDINGS: Lungs/Pleura: Mild scattered areas of subpleural reticulation in the lungs, consistent with mild fibrosis. There are branching linear calcifications noted in the left lower lobe. No evidence of consolidation, pleural effusion, or pneumothorax. No suspicious pulmonary nodules. Axilla/Soft Tissue: No pathologic axillary adenopathy. Mediastinum: The heart is normal in size. No pericardial effusion. Postsurgical changes of aortic valve replacement. Coronary artery calcium: Present. Severe coronary artery calcifications versus stents. No pathologic mediastinal or hilar adenopathy. Upper Abdomen: There are postoperative changes noted at the GE junction. Percutaneous gastrostomy tube in place. Severe calcific atherosclerosis in the upper abdomen. Bones: No evidence of acute  6/18/2024  INDICATION: Sepsis  EXAM:  AP CHEST RADIOGRAPH COMPARISON: None FINDINGS: AP portable view of the chest demonstrates a normal cardiomediastinal silhouette. There is nodular high density in the left lung base. Vague right midlung opacity. No pleural effusion, pulmonary edema, or pneumothorax. Chronic right rib fractures.     1. Vague density in the right midlung, recommend CT chest for further evaluation. 2. Nodular high density material left lung base may be related to previous aspirated material but is nonspecific. This may also be assessed at time of CT given lack of prior examinations. 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 _______________ FINDINGS: RIGHT KIDNEY: 12.0 x 4.8 x 4.9 cm. No hydronephrosis. No solid renal mass identified. To lower pole calculi measuring 6 mm and 3 mm. Increased renal cortical echogenicity. Normal cortical thickness. LEFT KIDNEY: 11.6 x 4.5 x 4.1 cm. No hydronephrosis. Mid to lower pole cyst measuring 1 cm and additional lower pole cyst measuring 0.5 cm No solid renal mass identified. No visible calculi. Slightly increased renal cortical echogenicity. Normal cortical thickness. BLADDER: Unremarkable. PROSTATE: Measures 3.9 x 3.9 x 4.2 cm (33.6 mL) _______________    Increased bilateral renal cortical echogenicity compatible with medical renal disease. Nonobstructing right renal calculi. No hydronephrosis bilaterally. Small left renal cysts. Prostatomegaly. Signed By: Cas Crowley MD on 5/28/2024 11:41 AM

## 2024-06-22 NOTE — PROGRESS NOTES
Patient received discharge instructions all questions were answered.no signs of distress at this time.patient will leave facility with his daughter on their private vehicle

## 2024-06-23 LAB
BACTERIA SPEC CULT: ABNORMAL
BACTERIA SPEC CULT: ABNORMAL
BACTERIA SPEC CULT: NORMAL
BACTERIA SPEC CULT: NORMAL
GRAM STN SPEC: ABNORMAL
GRAM STN SPEC: ABNORMAL
SERVICE CMNT-IMP: ABNORMAL
SERVICE CMNT-IMP: NORMAL
SERVICE CMNT-IMP: NORMAL

## 2024-06-24 LAB
BACTERIA SPEC CULT: NORMAL
SERVICE CMNT-IMP: NORMAL

## 2024-06-25 LAB
BACTERIA SPEC CULT: NORMAL
SERVICE CMNT-IMP: NORMAL

## 2024-07-01 LAB
EKG ATRIAL RATE: 73 BPM
EKG DIAGNOSIS: NORMAL
EKG P AXIS: 43 DEGREES
EKG P-R INTERVAL: 186 MS
EKG Q-T INTERVAL: 406 MS
EKG QRS DURATION: 90 MS
EKG QTC CALCULATION (BAZETT): 447 MS
EKG R AXIS: -24 DEGREES
EKG T AXIS: 42 DEGREES
EKG VENTRICULAR RATE: 73 BPM

## (undated) DEVICE — SUTURING DEVICE: Brand: ENDO STITCH

## (undated) DEVICE — [HIGH FLOW INSUFFLATOR,  DO NOT USE IF PACKAGE IS DAMAGED,  KEEP DRY,  KEEP AWAY FROM SUNLIGHT,  PROTECT FROM HEAT AND RADIOACTIVE SOURCES.]: Brand: PNEUMOSURE

## (undated) DEVICE — GUIDEWIRE UROLOGICAL STR 3 CM 0.038 INX150 CM DUAL-FLEX

## (undated) DEVICE — REM POLYHESIVE ADULT PATIENT RETURN ELECTRODE: Brand: VALLEYLAB

## (undated) DEVICE — GOWN,AURORA,NONRNF,XL,30/CS: Brand: MEDLINE

## (undated) DEVICE — INTENDED FOR TISSUE SEPARATION, AND OTHER PROCEDURES THAT REQUIRE A SHARP SURGICAL BLADE TO PUNCTURE OR CUT.: Brand: BARD-PARKER SAFETY BLADES SIZE 10, STERILE

## (undated) DEVICE — SYR 10ML LUER LOK 1/5ML GRAD --

## (undated) DEVICE — FCPS ENDOSCP 5MMX33CM -- ENDOPATH

## (undated) DEVICE — HANDPIECE SET WITH HIGH FLOW TIP AND SUCTION TUBE: Brand: INTERPULSE

## (undated) DEVICE — BANDAGE COMPR W4INXL15FT BGE E SGL LAYERED CLP CLSR

## (undated) DEVICE — SUTURE VCRL SZ 3-0 L27IN ABSRB UD L26MM SH 1/2 CIR J416H

## (undated) DEVICE — APPLIER RMFG CLP LIG LG 12MM --

## (undated) DEVICE — BANDAGE,ELASTIC,ESMARK,STERILE,4"X9',LF: Brand: MEDLINE

## (undated) DEVICE — SHEARS ENDOSCP L36CM DIA5MM ULTRASONIC CRV TIP W/ ADV

## (undated) DEVICE — BAG PRESSURE INFUSION 3 W STOPCOCK 3000 CC PREMIERPRO DISP

## (undated) DEVICE — DERMABOND SKIN ADH 0.7ML --

## (undated) DEVICE — BANDAGE,GAUZE,BULKEE II,4.5"X4.1YD,STRL: Brand: MEDLINE

## (undated) DEVICE — SOLUTION LACTATED RINGERS INJECTION USP

## (undated) DEVICE — NEEDLE HYPO 25GA L1.5IN BVL ORIENTED ECLIPSE

## (undated) DEVICE — DISPOSABLE SUCTION/IRRIGATOR TUBE SET WITH TIP: Brand: AHTO

## (undated) DEVICE — ZIMMER® STERILE DISPOSABLE TOURNIQUET CUFF WITH PROTECTIVE SLEEVE AND PLC, SINGLE PORT, SINGLE BLADDER, 18 IN. (46 CM)

## (undated) DEVICE — LEGGINGS, PAIR, 31X48, STERILE: Brand: MEDLINE

## (undated) DEVICE — STRAP,POSITIONING,KNEE/BODY,FOAM,4X60": Brand: MEDLINE

## (undated) DEVICE — TRAY PREP DRY W/ PREM GLV 2 APPL 6 SPNG 2 UNDPD 1 OVERWRAP

## (undated) DEVICE — PREP SKN CHLRAPRP APL 26ML STR --

## (undated) DEVICE — SOLUTION IRRIG 3000ML 0.9% SOD CHL FLX CONT 0797208] ICU MEDICAL INC]

## (undated) DEVICE — PREMIUM WET SKIN PREP TRAY: Brand: MEDLINE INDUSTRIES, INC.

## (undated) DEVICE — SUT MONOCRYL PLUS UD 4-0 --

## (undated) DEVICE — DRESSING,GAUZE,XEROFORM,CURAD,1"X8",ST: Brand: CURAD

## (undated) DEVICE — CYSTO PACK: Brand: MEDLINE INDUSTRIES, INC.

## (undated) DEVICE — SPECIMEN SOCK - FEMALE: Brand: MEDI-VAC

## (undated) DEVICE — Z INACTIVATING USE 2752260 STAPLER INT 12X4 MM TI MULTFI ENDO HERN

## (undated) DEVICE — TOWEL,OR,DSP,ST,BLUE,STD,4/PK,20PK/CS: Brand: MEDLINE

## (undated) DEVICE — GLOVE ORANGE PI 8   MSG9080

## (undated) DEVICE — LAP CHOLE: Brand: MEDLINE INDUSTRIES, INC.

## (undated) DEVICE — STERILE POLYISOPRENE POWDER-FREE SURGICAL GLOVES WITH EMOLLIENT COATING: Brand: PROTEXIS

## (undated) DEVICE — PAD,ABDOMINAL,5"X9",ST,LF,25/BX: Brand: MEDLINE INDUSTRIES, INC.

## (undated) DEVICE — PLEDGET SURG W0.375XL0.062IN THK1.5MM WHT SFT PTFE RECT

## (undated) DEVICE — GARMENT,MEDLINE,DVT,INT,CALF,MED, GEN2: Brand: MEDLINE

## (undated) DEVICE — 3M™ TEGADERM™ TRANSPARENT FILM DRESSING FRAME STYLE, 1626W, 4 IN X 4-3/4 IN (10 CM X 12 CM), 50/CT 4CT/CASE: Brand: 3M™ TEGADERM™

## (undated) DEVICE — SUTURE SZ 0 27IN 5/8 CIR UR-6  TAPER PT VIOLET ABSRB VICRYL J603H

## (undated) DEVICE — LAPAROSCOPIC TROCAR SLEEVE/SINGLE USE: Brand: KII® OPTICAL ACCESS SYSTEM

## (undated) DEVICE — DUAL LUMEN URETERAL CATHETER

## (undated) DEVICE — PACK PROCEDURE SURG EXTREMITY CUST

## (undated) DEVICE — TROCARS: Brand: KII® BALLOON BLUNT TIP SYSTEM

## (undated) DEVICE — SPONGE GZ W4XL4IN COT 12 PLY TYP VII WVN C FLD DSGN STERILE

## (undated) DEVICE — STERILE POLYISOPRENE POWDER-FREE SURGICAL GLOVES: Brand: PROTEXIS

## (undated) DEVICE — MARKER,SKIN,WI/RULER AND LABELS: Brand: MEDLINE

## (undated) DEVICE — THIS PRODUCT IS SINGLE USE AND INTENDED TO BE USED FOR BLUNT DISSECTION OF TISSUE.: Brand: ASPEN® ENDOSCOPIC KITTNER, SINGLE TIP

## (undated) DEVICE — Device

## (undated) DEVICE — CATH URETH INTMIT ROB 16FR FUN -- CONVERT TO ITEM 179520

## (undated) DEVICE — 200 MICRON SINGLE USE FIBER ASSEMBLY WITH FLAT TIP

## (undated) DEVICE — BANDAGE COMPR W4INXL10YD WHITE/BEIGE E MTRX HK LOOP CLSR

## (undated) DEVICE — SPONGE LAP W18XL18IN WHT STRUNG W/ RNG W/OUT LOOP RADPQ ST

## (undated) DEVICE — NEEDLE 25GA X 1.5 IN ECLIPSE

## (undated) DEVICE — INTENDED FOR TISSUE SEPARATION, AND OTHER PROCEDURES THAT REQUIRE A SHARP SURGICAL BLADE TO PUNCTURE OR CUT.: Brand: BARD-PARKER ® CARBON RIB-BACK BLADES

## (undated) DEVICE — DRAPE C ARM UNIV W41XL74IN CLR PLAS XR VELC CLSR POLY STRP

## (undated) DEVICE — TROCAR: Brand: KII® SLEEVE

## (undated) DEVICE — CATHETER URET 5FR L70CM OPN END SGL LUMN INJ HUB FLEXIMA

## (undated) DEVICE — DEVICE SUT 0 L48IN GRN POLY BRAID LD UNIT DISP SURGDAC

## (undated) DEVICE — PENROSE TUBING RADIOPAQUE: Brand: ARGYLE

## (undated) DEVICE — VISUALIZATION SYSTEM: Brand: CLEARIFY

## (undated) DEVICE — SWAB CULT LIQ STUART AGR AERB MOD IN BRK SGL RAYON TIP PLAS

## (undated) DEVICE — TOTAL TRAY, 16FR 10ML SIL FOLEY, URN: Brand: MEDLINE